# Patient Record
Sex: FEMALE | Race: WHITE | Employment: UNEMPLOYED | URBAN - NONMETROPOLITAN AREA
[De-identification: names, ages, dates, MRNs, and addresses within clinical notes are randomized per-mention and may not be internally consistent; named-entity substitution may affect disease eponyms.]

---

## 2017-09-12 ENCOUNTER — OFFICE VISIT (OUTPATIENT)
Dept: FAMILY MEDICINE CLINIC | Age: 32
End: 2017-09-12

## 2017-09-12 VITALS
HEART RATE: 93 BPM | BODY MASS INDEX: 45.99 KG/M2 | TEMPERATURE: 98.3 F | DIASTOLIC BLOOD PRESSURE: 84 MMHG | HEIGHT: 67 IN | WEIGHT: 293 LBS | SYSTOLIC BLOOD PRESSURE: 124 MMHG | OXYGEN SATURATION: 98 %

## 2017-09-12 DIAGNOSIS — G43.909 MIGRAINE WITHOUT STATUS MIGRAINOSUS, NOT INTRACTABLE, UNSPECIFIED MIGRAINE TYPE: ICD-10-CM

## 2017-09-12 DIAGNOSIS — M79.7 FIBROMYALGIA: ICD-10-CM

## 2017-09-12 DIAGNOSIS — K58.9 IRRITABLE BOWEL SYNDROME, UNSPECIFIED TYPE: Primary | ICD-10-CM

## 2017-09-12 DIAGNOSIS — F41.1 GENERALIZED ANXIETY DISORDER: ICD-10-CM

## 2017-09-12 DIAGNOSIS — J02.9 ACUTE PHARYNGITIS, UNSPECIFIED ETIOLOGY: ICD-10-CM

## 2017-09-12 DIAGNOSIS — K21.9 GASTROESOPHAGEAL REFLUX DISEASE, ESOPHAGITIS PRESENCE NOT SPECIFIED: ICD-10-CM

## 2017-09-12 LAB — S PYO AG THROAT QL: NORMAL

## 2017-09-12 PROCEDURE — 87880 STREP A ASSAY W/OPTIC: CPT | Performed by: NURSE PRACTITIONER

## 2017-09-12 PROCEDURE — 99204 OFFICE O/P NEW MOD 45 MIN: CPT | Performed by: NURSE PRACTITIONER

## 2017-09-12 RX ORDER — CYCLOBENZAPRINE HCL 10 MG
TABLET ORAL
Refills: 0 | COMMUNITY
Start: 2017-07-26 | End: 2017-09-12 | Stop reason: SDUPTHER

## 2017-09-12 RX ORDER — DICLOFENAC SODIUM 75 MG/1
TABLET, DELAYED RELEASE ORAL
Refills: 0 | COMMUNITY
Start: 2017-08-30 | End: 2018-06-01 | Stop reason: ALTCHOICE

## 2017-09-12 RX ORDER — FOLIC ACID 1 MG/1
TABLET ORAL
COMMUNITY
Start: 2015-06-19 | End: 2019-11-14 | Stop reason: ALTCHOICE

## 2017-09-12 RX ORDER — METOPROLOL TARTRATE 50 MG/1
TABLET, FILM COATED ORAL
Refills: 0 | COMMUNITY
Start: 2017-08-30 | End: 2017-12-04 | Stop reason: SDUPTHER

## 2017-09-12 RX ORDER — DICYCLOMINE HCL 20 MG
20 TABLET ORAL 4 TIMES DAILY
COMMUNITY
Start: 2015-07-21 | End: 2018-01-29 | Stop reason: ALTCHOICE

## 2017-09-12 RX ORDER — ALPRAZOLAM 0.5 MG/1
0.5 TABLET ORAL 2 TIMES DAILY PRN
COMMUNITY
Start: 2015-05-05 | End: 2018-02-22 | Stop reason: ALTCHOICE

## 2017-09-12 RX ORDER — AMOXICILLIN 875 MG/1
875 TABLET, COATED ORAL 2 TIMES DAILY
Qty: 20 TABLET | Refills: 0 | Status: SHIPPED | OUTPATIENT
Start: 2017-09-12 | End: 2018-01-25 | Stop reason: ALTCHOICE

## 2017-09-12 RX ORDER — BUTALBITAL, ACETAMINOPHEN AND CAFFEINE 50; 325; 40 MG/1; MG/1; MG/1
1 TABLET ORAL EVERY 4 HOURS PRN
COMMUNITY
Start: 2015-06-19 | End: 2017-09-12 | Stop reason: SDUPTHER

## 2017-09-12 RX ORDER — CYCLOBENZAPRINE HCL 10 MG
10 TABLET ORAL 3 TIMES DAILY PRN
Qty: 30 TABLET | Refills: 0 | Status: SHIPPED | OUTPATIENT
Start: 2017-09-12 | End: 2017-10-26 | Stop reason: SDUPTHER

## 2017-09-12 RX ORDER — ZONISAMIDE 100 MG/1
100 CAPSULE ORAL DAILY
COMMUNITY
End: 2017-09-12

## 2017-09-12 RX ORDER — LORATADINE 10 MG/1
CAPSULE, LIQUID FILLED ORAL
COMMUNITY
End: 2017-10-26 | Stop reason: SDUPTHER

## 2017-09-12 RX ORDER — PROMETHAZINE HYDROCHLORIDE 25 MG/1
25 TABLET ORAL EVERY 6 HOURS PRN
COMMUNITY
End: 2017-12-04 | Stop reason: SDUPTHER

## 2017-09-12 RX ORDER — PANTOPRAZOLE SODIUM 40 MG/1
TABLET, DELAYED RELEASE ORAL
COMMUNITY
Start: 2015-07-21 | End: 2017-12-04 | Stop reason: SDUPTHER

## 2017-09-12 RX ORDER — GABAPENTIN 300 MG/1
CAPSULE ORAL
Refills: 0 | COMMUNITY
Start: 2017-08-08 | End: 2018-02-22 | Stop reason: ALTCHOICE

## 2017-09-12 RX ORDER — KETOROLAC TROMETHAMINE 10 MG/1
10 TABLET, FILM COATED ORAL EVERY 6 HOURS PRN
Refills: 0 | COMMUNITY
Start: 2017-08-02 | End: 2017-10-26 | Stop reason: SDUPTHER

## 2017-09-12 RX ORDER — ALPRAZOLAM 0.5 MG/1
TABLET ORAL
Status: CANCELLED | OUTPATIENT
Start: 2017-09-12

## 2017-09-12 RX ORDER — BUTALBITAL, ACETAMINOPHEN AND CAFFEINE 50; 325; 40 MG/1; MG/1; MG/1
1 TABLET ORAL EVERY 4 HOURS PRN
Qty: 180 TABLET | Refills: 0 | Status: SHIPPED | OUTPATIENT
Start: 2017-09-12 | End: 2017-09-18 | Stop reason: SDUPTHER

## 2017-09-12 RX ORDER — MELOXICAM 15 MG/1
TABLET ORAL
Refills: 0 | COMMUNITY
Start: 2017-08-30 | End: 2017-09-12 | Stop reason: ALTCHOICE

## 2017-09-12 ASSESSMENT — ENCOUNTER SYMPTOMS
SORE THROAT: 1
SHORTNESS OF BREATH: 0
COUGH: 1
NAUSEA: 0

## 2017-09-15 LAB — THROAT CULTURE: NORMAL

## 2017-09-18 ENCOUNTER — TELEPHONE (OUTPATIENT)
Dept: FAMILY MEDICINE CLINIC | Age: 32
End: 2017-09-18

## 2017-09-18 DIAGNOSIS — G43.909 MIGRAINE WITHOUT STATUS MIGRAINOSUS, NOT INTRACTABLE, UNSPECIFIED MIGRAINE TYPE: ICD-10-CM

## 2017-09-18 RX ORDER — BUTALBITAL, ACETAMINOPHEN AND CAFFEINE 50; 325; 40 MG/1; MG/1; MG/1
1 TABLET ORAL EVERY 4 HOURS PRN
Qty: 48 TABLET | Refills: 0 | Status: SHIPPED | OUTPATIENT
Start: 2017-09-18 | End: 2017-10-26 | Stop reason: SDUPTHER

## 2017-09-19 ENCOUNTER — TELEPHONE (OUTPATIENT)
Dept: FAMILY MEDICINE CLINIC | Age: 32
End: 2017-09-19

## 2017-09-19 DIAGNOSIS — B37.9 YEAST INFECTION: Primary | ICD-10-CM

## 2017-09-19 RX ORDER — FLUCONAZOLE 150 MG/1
150 TABLET ORAL ONCE
Qty: 1 TABLET | Refills: 0 | Status: SHIPPED | OUTPATIENT
Start: 2017-09-19 | End: 2017-09-19

## 2017-10-13 ENCOUNTER — OFFICE VISIT (OUTPATIENT)
Dept: GASTROENTEROLOGY | Age: 32
End: 2017-10-13

## 2017-10-13 VITALS
DIASTOLIC BLOOD PRESSURE: 82 MMHG | SYSTOLIC BLOOD PRESSURE: 136 MMHG | HEART RATE: 72 BPM | RESPIRATION RATE: 14 BRPM | HEIGHT: 67 IN

## 2017-10-13 DIAGNOSIS — K21.9 GASTROESOPHAGEAL REFLUX DISEASE, ESOPHAGITIS PRESENCE NOT SPECIFIED: ICD-10-CM

## 2017-10-13 DIAGNOSIS — K58.2 IRRITABLE BOWEL SYNDROME WITH BOTH CONSTIPATION AND DIARRHEA: ICD-10-CM

## 2017-10-13 DIAGNOSIS — K58.2 IRRITABLE BOWEL SYNDROME WITH BOTH CONSTIPATION AND DIARRHEA: Primary | ICD-10-CM

## 2017-10-13 DIAGNOSIS — F17.218 CIGARETTE NICOTINE DEPENDENCE WITH OTHER NICOTINE-INDUCED DISORDER: ICD-10-CM

## 2017-10-13 PROCEDURE — 99202 OFFICE O/P NEW SF 15 MIN: CPT | Performed by: PHYSICIAN ASSISTANT

## 2017-10-13 RX ORDER — FAMOTIDINE 20 MG/1
20 TABLET, FILM COATED ORAL 2 TIMES DAILY
Qty: 60 TABLET | Refills: 3 | Status: ON HOLD | OUTPATIENT
Start: 2017-10-13 | End: 2018-02-15

## 2017-10-13 RX ORDER — DOCUSATE SODIUM 100 MG/1
100 CAPSULE, LIQUID FILLED ORAL DAILY PRN
Qty: 20 CAPSULE | Refills: 1 | Status: SHIPPED | OUTPATIENT
Start: 2017-10-13 | End: 2018-03-20 | Stop reason: SDUPTHER

## 2017-10-16 LAB — CELIAC PANEL: 9 UNITS (ref 0–19)

## 2017-10-16 ASSESSMENT — ENCOUNTER SYMPTOMS
BLOOD IN STOOL: 0
COLOR CHANGE: 0
CHEST TIGHTNESS: 0
APNEA: 0
SHORTNESS OF BREATH: 0
WHEEZING: 0
NAUSEA: 0
ABDOMINAL PAIN: 0
ANAL BLEEDING: 0
DIARRHEA: 1
CONSTIPATION: 1
RECTAL PAIN: 0
VOMITING: 0
ABDOMINAL DISTENTION: 0
COUGH: 0

## 2017-10-16 NOTE — PROGRESS NOTES
Subjective:     Sharda Montesinos is a 28 y.o. female who presents 10/13/2017 with:    Chief Complaint   Patient presents with    Irritable Bowel Syndrome     uncontrolled    Gastroesophageal Reflux       Patient presents for establishment of GI care as well as evaluation of irritable bowel disease. States she has had IBS for about 1 year, has constipation/diarrhea intermittently with equal occurrence of each. States no matter what she eats she becomes bloated, feels abd is distended, and has fecal urgency. Believes these symptoms are worse with breads, pastas, etc.  Knows that she is bothered by dairy products and avoids these foods. Has not noted purulence per rectum, denies vomiting, hematemesis, hematochezia, melena, rectal bleeding, rectal pain, weight loss, dysphagia, odynophagia but does admit to nausea, heartburn. Had EGD/colonoscopy in 2013 with Dr. Sandeep Tobar which showed mild gastritis and normal colonoscopy. Admits to smoking cigarettes currently, denies excessive ETOH and elicit drug abuse.          Patient Active Problem List   Diagnosis    Asthma    Generalized anxiety disorder    GERD (gastroesophageal reflux disease)    Migraine    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HCC)    PCOS (polycystic ovarian syndrome)    Elevated blood pressure reading    Human papilloma virus infection    Dyslipidemia    Obesity    Malabsorption syndrome    Family history of cancer    IBS (irritable bowel syndrome)    Status post cholecystectomy    Nicotine dependence    Vertigo    Depression    Coagulopathy (HCC)    Fibromyalgia     Past Medical History:   Diagnosis Date    Anxiety     Asthma     Depression 10/29/2013    Dyslipidemia     Elevated blood pressure     Fibromyalgia     Fingernail abnormalities     right middle fingernail deformity    Generalized anxiety disorder     GERD (gastroesophageal reflux disease)     HPV (human papillomavirus)     IBS (irritable bowel syndrome) 7/31/2012    Migraine     Obesity     Pap smear for cervical cancer screening     2009- normal, pcos    PCOS (polycystic ovarian syndrome)     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[address]     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 Social History Main Topics    Smoking status: Current Every Day Smoker     Packs/day: 0.30     Years: 17.00     Types: Cigarettes    Smokeless tobacco: Never Used    Alcohol use No    Drug use: No    Sexual activity: No     Other Topics Concern    Not on file     Social History Narrative    No narrative on file     Family History   Problem Relation Age of Onset    Arthritis Mother     Depression Mother     Depression Father     Diabetes Other     Cancer Other     Stroke Other     Obesity Other     Diabetes Maternal Aunt     Diabetes Maternal Uncle     Cancer Maternal Grandmother     Cancer Paternal Grandfather      Allergies:   Iodides and Lamotrigine  Current Outpatient Prescriptions   Medication Sig Dispense Refill    famotidine (PEPCID) 20 MG tablet Take 1 tablet by mouth 2 times daily 60 tablet 3    docusate sodium (COLACE) 100 MG capsule Take 1 capsule by mouth daily as needed for Constipation 20 capsule 1    psyllium (METAMUCIL SMOOTH TEXTURE) 28 % packet Take 1 packet by mouth 2 times daily Take with full glass of h20 or juice 30 each 4    butalbital-acetaminophen-caffeine (FIORICET, ESGIC) -40 MG per tablet Take 1 tablet by mouth every 4 hours as needed for Headaches 48 tablet 0    dicyclomine (BENTYL) 20 MG tablet Take 40 mg by mouth 2 times daily       folic acid (FOLVITE) 1 MG tablet       loratadine (CLARITIN) 10 MG capsule Take by mouth      pantoprazole (PROTONIX) 40 MG tablet       promethazine (PHENERGAN) 25 MG tablet Take 25 mg by mouth every 6 hours as needed for Nausea       diclofenac (VOLTAREN) 75 MG EC tablet TAKE 1 TABLET BY MOUTH TWICE DAILY with food or milk  0    gabapentin (NEURONTIN) 300 MG capsule nightly prn  0    ketorolac (TORADOL) 10 MG tablet Take 10 mg by mouth every 6 hours as needed (migraines)   0    metoprolol tartrate (LOPRESSOR) 50 MG tablet TAKE 1 TABLET BY MOUTH TWICE DAILY  0    cyclobenzaprine (FLEXERIL) 10 MG tablet Take 1 tablet by mouth 3 times daily as needed for Muscle spasms 30 tablet 0    ALPRAZolam (XANAX) 0.5 MG tablet Take 0.5 mg by mouth 2 times daily as needed       amoxicillin (AMOXIL) 875 MG tablet Take 1 tablet by mouth 2 times daily 20 tablet 0     No current facility-administered medications for this visit. Review of Systems   Constitutional: Negative for activity change, appetite change, chills, diaphoresis, fatigue, fever and unexpected weight change. Respiratory: Negative for apnea, cough, chest tightness, shortness of breath and wheezing. Cardiovascular: Negative for chest pain, palpitations and leg swelling. Gastrointestinal: Positive for constipation and diarrhea. Negative for abdominal distention, abdominal pain, anal bleeding, blood in stool, nausea, rectal pain and vomiting. Skin: Negative for color change, pallor, rash and wound. Neurological: Negative for dizziness, tremors, seizures, syncope, speech difficulty, weakness, light-headedness and headaches. Hematological: Negative for adenopathy. Does not bruise/bleed easily. Psychiatric/Behavioral: Negative for agitation, confusion, decreased concentration and dysphoric mood. The patient is not nervous/anxious. All other systems reviewed and are negative.       Objective:     Vitals:  /82   Pulse 72   Resp 14   Ht 5' 7\" (1.702 m)   There is no height or weight on file to calculate BMI. Physical Exam   Constitutional: She is oriented to person, place, and time. She appears well-developed and well-nourished. No distress. HENT:   Head: Normocephalic and atraumatic. Neck: Normal range of motion. Neck supple. No JVD present. No tracheal deviation present. No thyromegaly present. Cardiovascular: Normal rate, regular rhythm and normal heart sounds. Pulmonary/Chest: Effort normal and breath sounds normal. No respiratory distress. She has no wheezes. She has no rales. Abdominal: Soft. Bowel sounds are normal. She exhibits no distension. There is no tenderness. There is no rigidity and no guarding. Musculoskeletal: Normal range of motion. She exhibits no edema or tenderness. Neurological: She is alert and oriented to person, place, and time. Skin: Skin is warm and dry. No rash noted. No erythema. No pallor. Psychiatric: She has a normal mood and affect. Her behavior is normal. Thought content normal.   Nursing note and vitals reviewed. Assessment:     1. Irritable bowel syndrome with both constipation and diarrhea  Celiac Disease Panel   2. Gastroesophageal reflux disease, esophagitis presence not specified     3.  Cigarette nicotine dependence with other nicotine-induced disorder         Plan:     -Discussed taking PPI 30 minutes prior to meals with full glass of water    -Pepcid 20mg PO BID PRN for breakthrough reflux symptoms    -Metamucil 2 times daily until regular BM daily    -Colace 100mg PO BID PRN for constipation, take until having BM daily    -Informed to call office with any questions/concerns/changes    Orders Placed This Encounter   Procedures    Celiac Disease Panel     Standing Status:   Future     Number of Occurrences:   1     Standing Expiration Date:   10/13/2018     Orders Placed This Encounter   Medications    famotidine (PEPCID) 20 MG tablet     Sig: Take 1 tablet by mouth 2 times daily     Dispense:  60 tablet     Refill:  3    docusate sodium (COLACE) 100 MG capsule     Sig: Take 1 capsule by mouth daily as needed for Constipation     Dispense:  20 capsule     Refill:  1    psyllium (METAMUCIL SMOOTH TEXTURE) 28 % packet     Sig: Take 1 packet by mouth 2 times daily Take with full glass of h20 or juice     Dispense:  30 each     Refill:  4       Follow up:  No Follow-up on file.     ABRAHAM Bradford

## 2017-10-23 ENCOUNTER — TELEPHONE (OUTPATIENT)
Dept: FAMILY MEDICINE CLINIC | Age: 32
End: 2017-10-23

## 2017-10-23 NOTE — TELEPHONE ENCOUNTER
Pt stopped taking the amoxicillin after three days due to yeast infection. Sore throat and cough came back is there something else she can take. Please call into [facility]. Pt phone number is [phone].

## 2017-10-23 NOTE — TELEPHONE ENCOUNTER
She was seen over a month ago. This is likely a new illness and she will need to be seen again for this.

## 2017-10-24 ENCOUNTER — OFFICE VISIT (OUTPATIENT)
Dept: FAMILY MEDICINE CLINIC | Age: 32
End: 2017-10-24

## 2017-10-24 VITALS
WEIGHT: 293 LBS | HEART RATE: 79 BPM | SYSTOLIC BLOOD PRESSURE: 128 MMHG | OXYGEN SATURATION: 99 % | HEIGHT: 67 IN | TEMPERATURE: 96.8 F | BODY MASS INDEX: 45.99 KG/M2 | DIASTOLIC BLOOD PRESSURE: 88 MMHG

## 2017-10-24 DIAGNOSIS — J20.9 ACUTE BRONCHITIS, UNSPECIFIED ORGANISM: Primary | ICD-10-CM

## 2017-10-24 DIAGNOSIS — I10 ESSENTIAL HYPERTENSION: ICD-10-CM

## 2017-10-24 PROCEDURE — 99213 OFFICE O/P EST LOW 20 MIN: CPT | Performed by: NURSE PRACTITIONER

## 2017-10-24 PROCEDURE — G8417 CALC BMI ABV UP PARAM F/U: HCPCS | Performed by: NURSE PRACTITIONER

## 2017-10-24 PROCEDURE — G8427 DOCREV CUR MEDS BY ELIG CLIN: HCPCS | Performed by: NURSE PRACTITIONER

## 2017-10-24 PROCEDURE — G8484 FLU IMMUNIZE NO ADMIN: HCPCS | Performed by: NURSE PRACTITIONER

## 2017-10-24 PROCEDURE — 4004F PT TOBACCO SCREEN RCVD TLK: CPT | Performed by: NURSE PRACTITIONER

## 2017-10-24 RX ORDER — AZITHROMYCIN 250 MG/1
TABLET, FILM COATED ORAL
Qty: 1 PACKET | Refills: 0 | Status: SHIPPED | OUTPATIENT
Start: 2017-10-24 | End: 2018-01-25 | Stop reason: ALTCHOICE

## 2017-10-24 RX ORDER — AMITRIPTYLINE HYDROCHLORIDE 25 MG/1
TABLET, FILM COATED ORAL
Refills: 1 | COMMUNITY
Start: 2017-09-27 | End: 2017-10-24 | Stop reason: DRUGHIGH

## 2017-10-24 RX ORDER — AMITRIPTYLINE HYDROCHLORIDE 100 MG/1
50 TABLET, FILM COATED ORAL NIGHTLY
COMMUNITY
End: 2018-02-22 | Stop reason: DRUGHIGH

## 2017-10-24 ASSESSMENT — ENCOUNTER SYMPTOMS
COUGH: 1
RHINORRHEA: 1
NAUSEA: 0
SORE THROAT: 1
SHORTNESS OF BREATH: 0
SINUS PAIN: 1
SINUS PRESSURE: 1

## 2017-10-24 NOTE — PATIENT INSTRUCTIONS
serving is 1 slice of bread, 1 ounce of dry cereal, or ½ cup of cooked rice, pasta, or cooked cereal. Try to choose whole-grain products as much as possible. · Limit lean meat, poultry, and fish to 2 servings each day. A serving is 3 ounces, about the size of a deck of cards. · Eat 4 to 5 servings of nuts, seeds, and legumes (cooked dried beans, lentils, and split peas) each week. A serving is 1/3 cup of nuts, 2 tablespoons of seeds, or ½ cup of cooked beans or peas. · Limit fats and oils to 2 to 3 servings each day. A serving is 1 teaspoon of vegetable oil or 2 tablespoons of salad dressing. · Limit sweets and added sugars to 5 servings or less a week. A serving is 1 tablespoon jelly or jam, ½ cup sorbet, or 1 cup of lemonade. · Eat less than 2,300 milligrams (mg) of sodium a day. If you limit your sodium to 1,500 mg a day, you can lower your blood pressure even more. Tips for success  · Start small. Do not try to make dramatic changes to your diet all at once. You might feel that you are missing out on your favorite foods and then be more likely to not follow the plan. Make small changes, and stick with them. Once those changes become habit, add a few more changes. · Try some of the following:  ¨ Make it a goal to eat a fruit or vegetable at every meal and at snacks. This will make it easy to get the recommended amount of fruits and vegetables each day. ¨ Try yogurt topped with fruit and nuts for a snack or healthy dessert. ¨ Add lettuce, tomato, cucumber, and onion to sandwiches. ¨ Combine a ready-made pizza crust with low-fat mozzarella cheese and lots of vegetable toppings. Try using tomatoes, squash, spinach, broccoli, carrots, cauliflower, and onions. ¨ Have a variety of cut-up vegetables with a low-fat dip as an appetizer instead of chips and dip. ¨ Sprinkle sunflower seeds or chopped almonds over salads. Or try adding chopped walnuts or almonds to cooked vegetables.   ¨ Try some vegetarian meals with water softeners, which add sodium. Call your local water company to find out the sodium content of your water supply. If you buy bottled water, read the label and choose a sodium-free brand. Avoid high-sodium foods  · Avoid eating:  ¨ Smoked, cured, salted, and canned meat, fish, and poultry. ¨ Ham, aguilar, hot dogs, and luncheon meats. ¨ Regular, hard, and processed cheese and regular peanut butter. ¨ Crackers with salted tops, and other salted snack foods such as pretzels, chips, and salted popcorn. ¨ Frozen prepared meals, unless labeled low-sodium. ¨ Canned and dried soups, broths, and bouillon, unless labeled sodium-free or low-sodium. ¨ Canned vegetables, unless labeled sodium-free or low-sodium. ¨ [employer] fries, pizza, tacos, and other fast foods. ¨ Pickles, olives, ketchup, and other condiments, especially soy sauce, unless labeled sodium-free or low-sodium. Where can you learn more? Go to https://2DOLife.com.QuVIS. org and sign in to your [employer] account. Enter V739 in the [facility] box to learn more about \"Low Sodium Diet (2,000 Milligram): Care Instructions. \"     If you do not have an account, please click on the \"Sign Up Now\" link. Current as of: July 26, 2016  Content Version: 11.3  © 3536-8171 [employer]. Care instructions adapted under license by [facility] ([facility]). If you have questions about a medical condition or this instruction, always ask your healthcare professional. [place] [address] any warranty or liability for your use of this information.

## 2017-10-24 NOTE — PROGRESS NOTES
updated the computerized patient record. As always, patient is advised that if symptoms worsen in any way they will proceed to the nearest emergency room. No orders of the defined types were placed in this encounter. Orders Placed This Encounter   Medications    azithromycin (ZITHROMAX Z-CRISTA) 250 MG tablet     Sig: Take 2 tablets on day 1 then 1 tablet on days 2-5     Dispense:  1 packet     Refill:  0       Return in about 3 months (around 1/24/2018) for recheck bp.     Tank Galloway, CNP

## 2017-10-26 DIAGNOSIS — G43.909 MIGRAINE WITHOUT STATUS MIGRAINOSUS, NOT INTRACTABLE, UNSPECIFIED MIGRAINE TYPE: ICD-10-CM

## 2017-10-26 DIAGNOSIS — M79.7 FIBROMYALGIA: ICD-10-CM

## 2017-10-26 RX ORDER — CYCLOBENZAPRINE HCL 10 MG
10 TABLET ORAL 3 TIMES DAILY PRN
Qty: 30 TABLET | Refills: 0 | Status: SHIPPED | OUTPATIENT
Start: 2017-10-26 | End: 2018-01-29 | Stop reason: SDUPTHER

## 2017-10-26 RX ORDER — BUTALBITAL, ACETAMINOPHEN AND CAFFEINE 50; 325; 40 MG/1; MG/1; MG/1
1 TABLET ORAL EVERY 4 HOURS PRN
Qty: 48 TABLET | Refills: 0 | Status: SHIPPED | OUTPATIENT
Start: 2017-10-26 | End: 2017-12-04 | Stop reason: SDUPTHER

## 2017-10-26 RX ORDER — LORATADINE 10 MG/1
10 CAPSULE, LIQUID FILLED ORAL DAILY
Qty: 30 CAPSULE | Refills: 5 | Status: SHIPPED | OUTPATIENT
Start: 2017-10-26 | End: 2018-03-14 | Stop reason: SDUPTHER

## 2017-10-26 RX ORDER — KETOROLAC TROMETHAMINE 10 MG/1
10 TABLET, FILM COATED ORAL EVERY 6 HOURS PRN
Qty: 20 TABLET | Refills: 0 | Status: SHIPPED | OUTPATIENT
Start: 2017-10-26 | End: 2017-12-27 | Stop reason: SDUPTHER

## 2017-12-04 DIAGNOSIS — G43.909 MIGRAINE WITHOUT STATUS MIGRAINOSUS, NOT INTRACTABLE, UNSPECIFIED MIGRAINE TYPE: ICD-10-CM

## 2017-12-04 RX ORDER — BUTALBITAL, ACETAMINOPHEN AND CAFFEINE 50; 325; 40 MG/1; MG/1; MG/1
1 TABLET ORAL EVERY 4 HOURS PRN
Qty: 48 TABLET | Refills: 0 | Status: SHIPPED | OUTPATIENT
Start: 2017-12-04 | End: 2017-12-27 | Stop reason: SDUPTHER

## 2017-12-04 RX ORDER — PANTOPRAZOLE SODIUM 40 MG/1
40 TABLET, DELAYED RELEASE ORAL DAILY
Qty: 30 TABLET | Refills: 3 | Status: SHIPPED | OUTPATIENT
Start: 2017-12-04 | End: 2018-01-29 | Stop reason: SDUPTHER

## 2017-12-04 RX ORDER — PROMETHAZINE HYDROCHLORIDE 25 MG/1
25 TABLET ORAL EVERY 6 HOURS PRN
Qty: 15 TABLET | Refills: 0 | Status: SHIPPED | OUTPATIENT
Start: 2017-12-04 | End: 2018-01-25 | Stop reason: SDUPTHER

## 2017-12-04 RX ORDER — METOPROLOL TARTRATE 50 MG/1
TABLET, FILM COATED ORAL
Qty: 60 TABLET | Refills: 5 | Status: SHIPPED | OUTPATIENT
Start: 2017-12-04 | End: 2018-05-22 | Stop reason: SDUPTHER

## 2017-12-27 DIAGNOSIS — G43.909 MIGRAINE WITHOUT STATUS MIGRAINOSUS, NOT INTRACTABLE, UNSPECIFIED MIGRAINE TYPE: ICD-10-CM

## 2017-12-28 RX ORDER — BUTALBITAL, ACETAMINOPHEN AND CAFFEINE 50; 325; 40 MG/1; MG/1; MG/1
1 TABLET ORAL EVERY 4 HOURS PRN
Qty: 48 TABLET | Refills: 0 | Status: SHIPPED | OUTPATIENT
Start: 2017-12-28 | End: 2018-01-25 | Stop reason: SDUPTHER

## 2017-12-28 RX ORDER — KETOROLAC TROMETHAMINE 10 MG/1
10 TABLET, FILM COATED ORAL EVERY 6 HOURS PRN
Qty: 20 TABLET | Refills: 0 | Status: SHIPPED | OUTPATIENT
Start: 2017-12-28 | End: 2018-01-25 | Stop reason: SDUPTHER

## 2018-01-25 ENCOUNTER — OFFICE VISIT (OUTPATIENT)
Dept: FAMILY MEDICINE CLINIC | Age: 33
End: 2018-01-25
Payer: COMMERCIAL

## 2018-01-25 VITALS
DIASTOLIC BLOOD PRESSURE: 90 MMHG | SYSTOLIC BLOOD PRESSURE: 124 MMHG | WEIGHT: 293 LBS | BODY MASS INDEX: 45.99 KG/M2 | HEIGHT: 67 IN | HEART RATE: 76 BPM | TEMPERATURE: 96.7 F | OXYGEN SATURATION: 97 %

## 2018-01-25 DIAGNOSIS — G43.909 MIGRAINE WITHOUT STATUS MIGRAINOSUS, NOT INTRACTABLE, UNSPECIFIED MIGRAINE TYPE: Primary | ICD-10-CM

## 2018-01-25 DIAGNOSIS — R53.83 FATIGUE, UNSPECIFIED TYPE: ICD-10-CM

## 2018-01-25 DIAGNOSIS — Z13.220 LIPID SCREENING: ICD-10-CM

## 2018-01-25 DIAGNOSIS — R11.0 NAUSEA: ICD-10-CM

## 2018-01-25 DIAGNOSIS — Z23 NEED FOR 23-POLYVALENT PNEUMOCOCCAL POLYSACCHARIDE VACCINE: ICD-10-CM

## 2018-01-25 DIAGNOSIS — F41.1 GENERALIZED ANXIETY DISORDER: ICD-10-CM

## 2018-01-25 PROCEDURE — 90732 PPSV23 VACC 2 YRS+ SUBQ/IM: CPT | Performed by: NURSE PRACTITIONER

## 2018-01-25 PROCEDURE — G8484 FLU IMMUNIZE NO ADMIN: HCPCS | Performed by: NURSE PRACTITIONER

## 2018-01-25 PROCEDURE — 99214 OFFICE O/P EST MOD 30 MIN: CPT | Performed by: NURSE PRACTITIONER

## 2018-01-25 PROCEDURE — 90471 IMMUNIZATION ADMIN: CPT | Performed by: NURSE PRACTITIONER

## 2018-01-25 PROCEDURE — G8417 CALC BMI ABV UP PARAM F/U: HCPCS | Performed by: NURSE PRACTITIONER

## 2018-01-25 PROCEDURE — G8427 DOCREV CUR MEDS BY ELIG CLIN: HCPCS | Performed by: NURSE PRACTITIONER

## 2018-01-25 PROCEDURE — 4004F PT TOBACCO SCREEN RCVD TLK: CPT | Performed by: NURSE PRACTITIONER

## 2018-01-25 RX ORDER — BUTALBITAL, ACETAMINOPHEN AND CAFFEINE 50; 325; 40 MG/1; MG/1; MG/1
1 TABLET ORAL EVERY 4 HOURS PRN
Qty: 48 TABLET | Refills: 0 | Status: SHIPPED | OUTPATIENT
Start: 2018-01-25 | End: 2018-02-22 | Stop reason: SDUPTHER

## 2018-01-25 RX ORDER — PROMETHAZINE HYDROCHLORIDE 25 MG/1
25 TABLET ORAL EVERY 6 HOURS PRN
Qty: 15 TABLET | Refills: 0 | Status: SHIPPED | OUTPATIENT
Start: 2018-01-25 | End: 2018-03-14 | Stop reason: SDUPTHER

## 2018-01-25 RX ORDER — KETOROLAC TROMETHAMINE 10 MG/1
10 TABLET, FILM COATED ORAL EVERY 6 HOURS PRN
Qty: 20 TABLET | Refills: 0 | Status: SHIPPED | OUTPATIENT
Start: 2018-01-25 | End: 2018-01-29 | Stop reason: ALTCHOICE

## 2018-01-25 ASSESSMENT — PATIENT HEALTH QUESTIONNAIRE - PHQ9
2. FEELING DOWN, DEPRESSED OR HOPELESS: 1
SUM OF ALL RESPONSES TO PHQ QUESTIONS 1-9: 2
1. LITTLE INTEREST OR PLEASURE IN DOING THINGS: 1
SUM OF ALL RESPONSES TO PHQ9 QUESTIONS 1 & 2: 2

## 2018-01-25 ASSESSMENT — ENCOUNTER SYMPTOMS
SHORTNESS OF BREATH: 0
COUGH: 0

## 2018-01-29 ENCOUNTER — OFFICE VISIT (OUTPATIENT)
Dept: GASTROENTEROLOGY | Age: 33
End: 2018-01-29
Payer: COMMERCIAL

## 2018-01-29 VITALS
SYSTOLIC BLOOD PRESSURE: 120 MMHG | DIASTOLIC BLOOD PRESSURE: 76 MMHG | HEART RATE: 106 BPM | OXYGEN SATURATION: 97 % | WEIGHT: 293 LBS | BODY MASS INDEX: 45.99 KG/M2 | HEIGHT: 67 IN

## 2018-01-29 DIAGNOSIS — K58.9 IRRITABLE BOWEL SYNDROME, UNSPECIFIED TYPE: Primary | ICD-10-CM

## 2018-01-29 DIAGNOSIS — K21.00 GERD WITH ESOPHAGITIS: ICD-10-CM

## 2018-01-29 DIAGNOSIS — M79.7 FIBROMYALGIA: ICD-10-CM

## 2018-01-29 DIAGNOSIS — R53.83 FATIGUE, UNSPECIFIED TYPE: ICD-10-CM

## 2018-01-29 DIAGNOSIS — Z13.220 LIPID SCREENING: ICD-10-CM

## 2018-01-29 LAB
CHOLESTEROL, TOTAL: 203 MG/DL (ref 0–199)
HCT VFR BLD CALC: 42.2 % (ref 37–47)
HDLC SERPL-MCNC: 44 MG/DL (ref 40–59)
HEMOGLOBIN: 13.6 G/DL (ref 12–16)
IRON SATURATION: 13 % (ref 11–46)
IRON: 31 UG/DL (ref 37–145)
LDL CHOLESTEROL CALCULATED: 134 MG/DL (ref 0–129)
MCH RBC QN AUTO: 27.7 PG (ref 27–31.3)
MCHC RBC AUTO-ENTMCNC: 32.2 % (ref 33–37)
MCV RBC AUTO: 85.9 FL (ref 82–100)
PDW BLD-RTO: 16.2 % (ref 11.5–14.5)
PLATELET # BLD: 289 K/UL (ref 130–400)
RBC # BLD: 4.91 M/UL (ref 4.2–5.4)
TOTAL IRON BINDING CAPACITY: 245 UG/DL (ref 178–450)
TRIGL SERPL-MCNC: 126 MG/DL (ref 0–200)
TSH REFLEX: 1.55 UIU/ML (ref 0.27–4.2)
WBC # BLD: 9.4 K/UL (ref 4.8–10.8)

## 2018-01-29 PROCEDURE — 4004F PT TOBACCO SCREEN RCVD TLK: CPT | Performed by: PHYSICIAN ASSISTANT

## 2018-01-29 PROCEDURE — G8484 FLU IMMUNIZE NO ADMIN: HCPCS | Performed by: PHYSICIAN ASSISTANT

## 2018-01-29 PROCEDURE — G8427 DOCREV CUR MEDS BY ELIG CLIN: HCPCS | Performed by: PHYSICIAN ASSISTANT

## 2018-01-29 PROCEDURE — G8417 CALC BMI ABV UP PARAM F/U: HCPCS | Performed by: PHYSICIAN ASSISTANT

## 2018-01-29 PROCEDURE — 99213 OFFICE O/P EST LOW 20 MIN: CPT | Performed by: PHYSICIAN ASSISTANT

## 2018-01-29 RX ORDER — DICYCLOMINE HCL 20 MG
20 TABLET ORAL EVERY 6 HOURS
Qty: 120 TABLET | Refills: 3 | Status: SHIPPED | OUTPATIENT
Start: 2018-01-29 | End: 2018-03-20 | Stop reason: SDUPTHER

## 2018-01-29 RX ORDER — PANTOPRAZOLE SODIUM 40 MG/1
40 TABLET, DELAYED RELEASE ORAL
Qty: 60 TABLET | Refills: 1 | Status: SHIPPED | OUTPATIENT
Start: 2018-01-29 | End: 2018-04-11 | Stop reason: SDUPTHER

## 2018-01-29 ASSESSMENT — ENCOUNTER SYMPTOMS
VOMITING: 0
RECTAL PAIN: 0
ABDOMINAL DISTENTION: 0
ANAL BLEEDING: 0
BLOOD IN STOOL: 0
WHEEZING: 0
CONSTIPATION: 0
ABDOMINAL PAIN: 1
COLOR CHANGE: 0
CHEST TIGHTNESS: 0
APNEA: 0
NAUSEA: 0
COUGH: 0
DIARRHEA: 1
SHORTNESS OF BREATH: 0

## 2018-01-29 NOTE — PROGRESS NOTES
Subjective:     Puneet Warner is a 28 y.o. female who presents 1/29/2018 with:    Chief Complaint   Patient presents with    Abdominal Pain     cramping, burning    Nausea     constant        Patient presents for establishment of GI care as well as evaluation of irritable bowel disease. States she has had IBS for about 1 year, has constipation/diarrhea intermittently with equal occurrence of each. States no matter what she eats she becomes bloated, feels abd is distended, and has fecal urgency. Believes these symptoms are worse with breads, pastas, etc.  Knows that she is bothered by dairy products and avoids these foods. Has not noted purulence per rectum, denies vomiting, hematemesis, hematochezia, melena, rectal bleeding, rectal pain, weight loss, dysphagia, odynophagia but does admit to nausea, heartburn. Had EGD/colonoscopy in 2013 with Dr. Geovany Maldonado which showed mild gastritis and normal colonoscopy. Admits to smoking cigarettes currently, denies excessive ETOH and elicit drug abuse. 1/29/18  Patient presents for f/u regarding abd cramping, rapid transit and poorly controlled GERD. Patient seen previously for these issues which were not under good control, started on Protonix and Bentyl which she reports improved symptoms however she has regressed and is having frequent heartburn, cramping after meals and having to rush to the bathroom urgently for BM 30 minutes after eating. Patient does have significant history of H pylori gastritis diagnosed via EGD performed in 2013. She states her current symptoms are similar to those around that time. Abdominal Pain   Associated symptoms include diarrhea. Pertinent negatives include no constipation, fever, headaches, nausea or vomiting.        Patient Active Problem List   Diagnosis    Asthma    Generalized anxiety disorder    GERD (gastroesophageal reflux disease)    Migraine    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HCC)    PCOS (polycystic ovarian syndrome)    Negative for abdominal distention, anal bleeding, blood in stool, constipation, nausea, rectal pain and vomiting. Skin: Negative for color change, pallor, rash and wound. Neurological: Negative for dizziness, tremors, seizures, syncope, speech difficulty, weakness, light-headedness and headaches. Hematological: Negative for adenopathy. Does not bruise/bleed easily. Psychiatric/Behavioral: Negative for agitation, confusion, decreased concentration and dysphoric mood. The patient is not nervous/anxious. All other systems reviewed and are negative. Objective:     Vitals:  /76   Pulse 106   Ht 5' 7\" (1.702 m)   Wt (!) 323 lb (146.5 kg)   SpO2 97%   BMI 50.59 kg/m²   Body mass index is 50.59 kg/m². Physical Exam   Constitutional: She is oriented to person, place, and time. She appears well-developed and well-nourished. No distress. HENT:   Head: Normocephalic and atraumatic. Eyes: Right eye exhibits no discharge. Left eye exhibits no discharge. No scleral icterus. Neck: Normal range of motion. Neck supple. No JVD present. No tracheal deviation present. No thyromegaly present. Cardiovascular: Normal rate, regular rhythm and normal heart sounds. Pulmonary/Chest: Effort normal and breath sounds normal. No respiratory distress. She has no wheezes. She has no rales. Abdominal: Soft. Bowel sounds are normal. She exhibits no distension. There is no tenderness. There is no rigidity and no guarding. Musculoskeletal: Normal range of motion. She exhibits no edema, tenderness or deformity. Lymphadenopathy:     She has no cervical adenopathy. Neurological: She is alert and oriented to person, place, and time. Skin: Skin is warm and dry. No rash noted. No erythema. No pallor. Psychiatric: She has a normal mood and affect. Her behavior is normal. Thought content normal.   Nursing note and vitals reviewed. Assessment:     1. Irritable bowel syndrome, unspecified type     2.  GERD

## 2018-01-29 NOTE — PROGRESS NOTES
capsule nightly prn  0     No current facility-administered medications for this visit. Review of Systems    Objective:     Vitals:  /76   Pulse 106   Ht 5' 7\" (1.702 m)   Wt (!) 323 lb (146.5 kg)   SpO2 97%   BMI 50.59 kg/m²   Body mass index is 50.59 kg/m². Physical Exam    Assessment:     No diagnosis found. Plan:     No orders of the defined types were placed in this encounter. No orders of the defined types were placed in this encounter. Follow up:  No Follow-up on file.     ABRAHAM Fishman

## 2018-01-30 RX ORDER — CYCLOBENZAPRINE HCL 10 MG
10 TABLET ORAL 3 TIMES DAILY PRN
Qty: 30 TABLET | Refills: 0 | Status: SHIPPED | OUTPATIENT
Start: 2018-01-30 | End: 2018-03-23 | Stop reason: SDUPTHER

## 2018-02-12 ENCOUNTER — ANESTHESIA EVENT (OUTPATIENT)
Dept: OPERATING ROOM | Age: 33
End: 2018-02-12
Payer: COMMERCIAL

## 2018-02-13 ENCOUNTER — ANESTHESIA (OUTPATIENT)
Dept: OPERATING ROOM | Age: 33
End: 2018-02-13
Payer: COMMERCIAL

## 2018-02-15 ENCOUNTER — HOSPITAL ENCOUNTER (OUTPATIENT)
Age: 33
Setting detail: OUTPATIENT SURGERY
Discharge: HOME | End: 2018-02-15
Attending: INTERNAL MEDICINE | Admitting: INTERNAL MEDICINE
Payer: COMMERCIAL

## 2018-02-15 VITALS
DIASTOLIC BLOOD PRESSURE: 72 MMHG | RESPIRATION RATE: 17 BRPM | SYSTOLIC BLOOD PRESSURE: 156 MMHG | OXYGEN SATURATION: 96 %

## 2018-02-15 VITALS
SYSTOLIC BLOOD PRESSURE: 123 MMHG | RESPIRATION RATE: 15 BRPM | BODY MASS INDEX: 45.99 KG/M2 | HEART RATE: 79 BPM | OXYGEN SATURATION: 96 % | WEIGHT: 293 LBS | HEIGHT: 67 IN | DIASTOLIC BLOOD PRESSURE: 76 MMHG | TEMPERATURE: 97.9 F

## 2018-02-15 DIAGNOSIS — K31.89 RETAINED FOOD IN STOMACH: Primary | ICD-10-CM

## 2018-02-15 DIAGNOSIS — K21.9 GASTROESOPHAGEAL REFLUX DISEASE WITHOUT ESOPHAGITIS: ICD-10-CM

## 2018-02-15 PROCEDURE — 2580000003 HC RX 258: Performed by: INTERNAL MEDICINE

## 2018-02-15 PROCEDURE — 7100000011 HC PHASE II RECOVERY - ADDTL 15 MIN: Performed by: INTERNAL MEDICINE

## 2018-02-15 PROCEDURE — 2500000003 HC RX 250 WO HCPCS: Performed by: STUDENT IN AN ORGANIZED HEALTH CARE EDUCATION/TRAINING PROGRAM

## 2018-02-15 PROCEDURE — 7100000010 HC PHASE II RECOVERY - FIRST 15 MIN: Performed by: INTERNAL MEDICINE

## 2018-02-15 PROCEDURE — 2580000003 HC RX 258: Performed by: STUDENT IN AN ORGANIZED HEALTH CARE EDUCATION/TRAINING PROGRAM

## 2018-02-15 PROCEDURE — 43235 EGD DIAGNOSTIC BRUSH WASH: CPT | Performed by: INTERNAL MEDICINE

## 2018-02-15 PROCEDURE — 3700000001 HC ADD 15 MINUTES (ANESTHESIA): Performed by: INTERNAL MEDICINE

## 2018-02-15 PROCEDURE — 3700000000 HC ANESTHESIA ATTENDED CARE: Performed by: INTERNAL MEDICINE

## 2018-02-15 PROCEDURE — 3609017100 HC EGD: Performed by: INTERNAL MEDICINE

## 2018-02-15 RX ORDER — DIPHENHYDRAMINE HYDROCHLORIDE 50 MG/ML
12.5 INJECTION INTRAMUSCULAR; INTRAVENOUS
Status: DISCONTINUED | OUTPATIENT
Start: 2018-02-15 | End: 2018-02-15 | Stop reason: HOSPADM

## 2018-02-15 RX ORDER — FENTANYL CITRATE 50 UG/ML
50 INJECTION, SOLUTION INTRAMUSCULAR; INTRAVENOUS EVERY 10 MIN PRN
Status: DISCONTINUED | OUTPATIENT
Start: 2018-02-15 | End: 2018-02-15 | Stop reason: HOSPADM

## 2018-02-15 RX ORDER — MAGNESIUM HYDROXIDE 1200 MG/15ML
LIQUID ORAL PRN
Status: DISCONTINUED | OUTPATIENT
Start: 2018-02-15 | End: 2018-02-15 | Stop reason: HOSPADM

## 2018-02-15 RX ORDER — SODIUM CHLORIDE 0.9 % (FLUSH) 0.9 %
10 SYRINGE (ML) INJECTION EVERY 12 HOURS SCHEDULED
Status: DISCONTINUED | OUTPATIENT
Start: 2018-02-15 | End: 2018-02-15 | Stop reason: HOSPADM

## 2018-02-15 RX ORDER — ONDANSETRON 2 MG/ML
4 INJECTION INTRAMUSCULAR; INTRAVENOUS
Status: DISCONTINUED | OUTPATIENT
Start: 2018-02-15 | End: 2018-02-15 | Stop reason: HOSPADM

## 2018-02-15 RX ORDER — MEPERIDINE HYDROCHLORIDE 25 MG/ML
12.5 INJECTION INTRAMUSCULAR; INTRAVENOUS; SUBCUTANEOUS EVERY 5 MIN PRN
Status: DISCONTINUED | OUTPATIENT
Start: 2018-02-15 | End: 2018-02-15 | Stop reason: HOSPADM

## 2018-02-15 RX ORDER — METOCLOPRAMIDE HYDROCHLORIDE 5 MG/ML
10 INJECTION INTRAMUSCULAR; INTRAVENOUS
Status: DISCONTINUED | OUTPATIENT
Start: 2018-02-15 | End: 2018-02-15 | Stop reason: HOSPADM

## 2018-02-15 RX ORDER — LIDOCAINE HYDROCHLORIDE 10 MG/ML
1 INJECTION, SOLUTION EPIDURAL; INFILTRATION; INTRACAUDAL; PERINEURAL
Status: COMPLETED | OUTPATIENT
Start: 2018-02-15 | End: 2018-02-15

## 2018-02-15 RX ORDER — SODIUM CHLORIDE, SODIUM LACTATE, POTASSIUM CHLORIDE, CALCIUM CHLORIDE 600; 310; 30; 20 MG/100ML; MG/100ML; MG/100ML; MG/100ML
INJECTION, SOLUTION INTRAVENOUS CONTINUOUS
Status: DISCONTINUED | OUTPATIENT
Start: 2018-02-15 | End: 2018-02-15 | Stop reason: HOSPADM

## 2018-02-15 RX ORDER — SODIUM CHLORIDE 0.9 % (FLUSH) 0.9 %
10 SYRINGE (ML) INJECTION PRN
Status: DISCONTINUED | OUTPATIENT
Start: 2018-02-15 | End: 2018-02-15 | Stop reason: HOSPADM

## 2018-02-15 RX ADMIN — SODIUM CHLORIDE, POTASSIUM CHLORIDE, SODIUM LACTATE AND CALCIUM CHLORIDE: 600; 310; 30; 20 INJECTION, SOLUTION INTRAVENOUS at 07:34

## 2018-02-15 RX ADMIN — LIDOCAINE HYDROCHLORIDE: 10 INJECTION, SOLUTION EPIDURAL; INFILTRATION; INTRACAUDAL; PERINEURAL at 07:33

## 2018-02-15 ASSESSMENT — LIFESTYLE VARIABLES: SMOKING_STATUS: 1

## 2018-02-15 ASSESSMENT — PULMONARY FUNCTION TESTS
PIF_VALUE: 1
PIF_VALUE: 2
PIF_VALUE: 2
PIF_VALUE: 1
PIF_VALUE: 2
PIF_VALUE: 1

## 2018-02-15 ASSESSMENT — PAIN - FUNCTIONAL ASSESSMENT: PAIN_FUNCTIONAL_ASSESSMENT: 0-10

## 2018-02-15 NOTE — OP NOTE
normal Z line and GE junction    Recommendations: - Recommend gastric emptying study next  Follow up with Wannetta Duane, PA in GI clinic in 4 weeks, Call [phone] to scheduled for follow-up appointment.     Fabricio Atkinson MD  [facility]

## 2018-02-15 NOTE — H&P
Skin: Skin is warm and dry. No rash noted. No erythema. No pallor. Psychiatric: She has a normal mood and affect. Her behavior is normal. Thought content normal.   Nursing note and vitals reviewed.        Assessment:      1. Irritable bowel syndrome, unspecified type      2. GERD with esophagitis  87265 - [facility] UPPER GI ENDOSCOPY,DIAGNOSIS         Plan:      -Discussed lifestyle modification including weight loss, exercise, elevating head of bed, avoiding NSAID's/ETOH/caffeine/chocolate/acidic food and drink, avoid large meals and eating 2-3 hours prior to bedtime     -Increase Protonix 40mg PO BID for 4 weeks     -Change Bentyl 20mg PO QID for cramping     -Due to nature of symptoms and previous history of H pylori gastritis patient would benefit from EGD to evaluate upper GI tract.   Well informed of testing procedure and risks/benefits and does wish to proceed     -EGD to be performed in OR due to body habitus/BMI>50            Orders Placed This Encounter   Procedures    40313 - [facility] UPPER GI ENDOSCOPY,DIAGNOSIS      Encounter Medications         Orders Placed This Encounter   Medications    pantoprazole (PROTONIX) 40 MG tablet       Sig: Take 1 tablet by mouth 2 times daily (before meals)       Dispense:  60 tablet       Refill:  1    dicyclomine (BENTYL) 20 MG tablet       Sig: Take 1 tablet by mouth every 6 hours       Dispense:  120 tablet       Refill:  3            Follow up:  Call or return to clinic prn if these symptoms worsen or fail to improve as anticipated.        ABRAHAM Deins

## 2018-02-22 ENCOUNTER — OFFICE VISIT (OUTPATIENT)
Dept: FAMILY MEDICINE CLINIC | Age: 33
End: 2018-02-22
Payer: COMMERCIAL

## 2018-02-22 VITALS
WEIGHT: 293 LBS | BODY MASS INDEX: 45.99 KG/M2 | SYSTOLIC BLOOD PRESSURE: 106 MMHG | OXYGEN SATURATION: 98 % | DIASTOLIC BLOOD PRESSURE: 68 MMHG | HEIGHT: 67 IN | HEART RATE: 85 BPM | TEMPERATURE: 97.4 F

## 2018-02-22 DIAGNOSIS — H65.01 RIGHT ACUTE SEROUS OTITIS MEDIA, RECURRENCE NOT SPECIFIED: ICD-10-CM

## 2018-02-22 DIAGNOSIS — F41.1 GENERALIZED ANXIETY DISORDER: ICD-10-CM

## 2018-02-22 DIAGNOSIS — G43.909 MIGRAINE WITHOUT STATUS MIGRAINOSUS, NOT INTRACTABLE, UNSPECIFIED MIGRAINE TYPE: ICD-10-CM

## 2018-02-22 DIAGNOSIS — J01.40 ACUTE NON-RECURRENT PANSINUSITIS: Primary | ICD-10-CM

## 2018-02-22 DIAGNOSIS — F32.A DEPRESSION, UNSPECIFIED DEPRESSION TYPE: ICD-10-CM

## 2018-02-22 DIAGNOSIS — J20.9 ACUTE BRONCHITIS, UNSPECIFIED ORGANISM: ICD-10-CM

## 2018-02-22 LAB
INFLUENZA A ANTIBODY: NEGATIVE
INFLUENZA B ANTIBODY: NEGATIVE

## 2018-02-22 PROCEDURE — G8417 CALC BMI ABV UP PARAM F/U: HCPCS | Performed by: NURSE PRACTITIONER

## 2018-02-22 PROCEDURE — G8427 DOCREV CUR MEDS BY ELIG CLIN: HCPCS | Performed by: NURSE PRACTITIONER

## 2018-02-22 PROCEDURE — 99213 OFFICE O/P EST LOW 20 MIN: CPT | Performed by: NURSE PRACTITIONER

## 2018-02-22 PROCEDURE — 87804 INFLUENZA ASSAY W/OPTIC: CPT | Performed by: NURSE PRACTITIONER

## 2018-02-22 PROCEDURE — 4004F PT TOBACCO SCREEN RCVD TLK: CPT | Performed by: NURSE PRACTITIONER

## 2018-02-22 PROCEDURE — G8484 FLU IMMUNIZE NO ADMIN: HCPCS | Performed by: NURSE PRACTITIONER

## 2018-02-22 RX ORDER — CEFDINIR 300 MG/1
300 CAPSULE ORAL 2 TIMES DAILY
Qty: 20 CAPSULE | Refills: 0 | Status: SHIPPED | OUTPATIENT
Start: 2018-02-22 | End: 2018-03-04

## 2018-02-22 RX ORDER — AMITRIPTYLINE HYDROCHLORIDE 50 MG/1
TABLET, FILM COATED ORAL
Refills: 2 | COMMUNITY
Start: 2018-01-22 | End: 2018-03-29 | Stop reason: ALTCHOICE

## 2018-02-22 RX ORDER — SERTRALINE HYDROCHLORIDE 100 MG/1
100 TABLET, FILM COATED ORAL DAILY
Qty: 30 TABLET | Refills: 3 | Status: SHIPPED | OUTPATIENT
Start: 2018-02-22 | End: 2018-03-14 | Stop reason: SDUPTHER

## 2018-02-22 RX ORDER — BUTALBITAL, ACETAMINOPHEN AND CAFFEINE 50; 325; 40 MG/1; MG/1; MG/1
1 TABLET ORAL EVERY 4 HOURS PRN
Qty: 48 TABLET | Refills: 0 | Status: SHIPPED | OUTPATIENT
Start: 2018-02-22 | End: 2018-03-29 | Stop reason: SDUPTHER

## 2018-02-22 ASSESSMENT — ENCOUNTER SYMPTOMS
SORE THROAT: 1
COUGH: 1
WHEEZING: 0
RHINORRHEA: 0
SINUS PAIN: 1
SHORTNESS OF BREATH: 1
SINUS PRESSURE: 1

## 2018-02-22 NOTE — PROGRESS NOTES
Subjective  Chief Complaint   Patient presents with    Cough     states cough, sore throat, nausea on and off last 2 weeks.  Discuss Medications     would like to increase zoloft dose. Cough   This is a recurrent problem. The current episode started 1 to 4 weeks ago. The problem has been gradually worsening. The problem occurs every few minutes. The cough is non-productive. Associated symptoms include chills, ear pain, a fever (yesterday morning 101), headaches, myalgias, a sore throat, shortness of breath and sweats. Pertinent negatives include no chest pain, nasal congestion, rhinorrhea or wheezing. Associated symptoms comments: Felt like she was going to pass out this morning. . The symptoms are aggravated by lying down. She has tried OTC cough suppressant (tylenol, motrin) for the symptoms. The treatment provided mild relief. Her past medical history is significant for bronchitis and pneumonia. Says the zoloft is working, but would like to go up a little bit because she feels that it could do better.      Patient Active Problem List    Diagnosis Date Noted    Fibromyalgia     Vertigo 10/29/2013    Depression 10/29/2013    Coagulopathy ([address] 10/29/2013    IBS (irritable bowel syndrome) 07/31/2012    Status post cholecystectomy 07/31/2012    Nicotine dependence 07/31/2012    Malabsorption syndrome 06/08/2012    Family history of cancer 06/08/2012    Asthma     Generalized anxiety disorder     GERD (gastroesophageal reflux disease)     Migraine     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HCC)     PCOS (polycystic ovarian syndrome)     Elevated blood pressure reading     Human papilloma virus infection     Dyslipidemia     Obesity      Past Medical History:   Diagnosis Date    Anxiety     Asthma     CAD (coronary artery disease)     Tachycardia    Depression 10/29/2013    Dyslipidemia     Elevated blood pressure     Fibromyalgia     Fingernail abnormalities     right middle fingernail deformity    Generalized anxiety disorder     GERD (gastroesophageal reflux disease)     HPV (human papillomavirus)     Hypertension     IBS (irritable bowel syndrome) 2012    Migraine     Obesity     Pap smear for cervical cancer screening     - normal, pcos    PCOS (polycystic ovarian syndrome)     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[facility]     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     Past Surgical History:   Procedure Laterality Date    ARM SURGERY Left     ulna shortening    CARPAL TUNNEL RELEASE Bilateral      SECTION      CHOLECYSTECTOMY      [place] EGD TRANSORAL BIOPSY SINGLE/MULTIPLE N/A 2/15/2018    EGD ESOPHAGOGASTRODUODENOSCOPY performed by Luciano Molina MD at [address] ARTHROSCOPY Left     WRIST ARTHROSCOPY Left      Family History   Problem Relation Age of Onset    Arthritis Mother     Depression Mother     Depression Father     Diabetes Other     Cancer Other     Stroke Other     Obesity Other     Diabetes Maternal Aunt     Diabetes Maternal Uncle     Cancer Maternal Grandmother      colon    Cancer Paternal Grandfather      Social History     Social History    Marital status:      Spouse name: N/A    Number of children: N/A    Years of education: N/A     Social History Main Topics    Smoking status: Current Every Day Smoker     Packs/day: 0.30     Years: 17.00     Types: Cigarettes    Smokeless tobacco: Never Used    Alcohol use No    Drug use: No    Sexual activity: No     Other Topics Concern    None     Social History Narrative    None     Current Outpatient Prescriptions on File Prior to Visit   Medication Sig Dispense Refill    cyclobenzaprine (FLEXERIL) 10 MG tablet Take 1 tablet by mouth 3 times daily as needed for Muscle spasms 30 tablet 0    pantoprazole (PROTONIX) 40 MG tablet Take 1 tablet by mouth 2 times daily (before meals) 60 tablet 1    dicyclomine (BENTYL) 20 MG tablet Take 1 tablet by mouth every 6 hours 120 tablet 3    promethazine (PHENERGAN) 25 MG tablet Take 1 tablet by mouth every 6 hours as needed for Nausea 15 tablet 0    metoprolol tartrate (LOPRESSOR) 50 MG tablet TAKE 1 TABLET BY MOUTH TWICE DAILY 60 tablet 5    loratadine (CLARITIN) 10 MG capsule Take 1 capsule by mouth daily 30 capsule 5    docusate sodium (COLACE) 100 MG capsule Take 1 capsule by mouth daily as needed for Constipation 20 capsule 1    folic acid (FOLVITE) 1 MG tablet       diclofenac (VOLTAREN) 75 MG EC tablet TAKE 1 TABLET BY MOUTH TWICE DAILY with food or milk  0     No current facility-administered medications on file prior to visit. Allergies   Allergen Reactions    Iodides     Lamotrigine Other (See Comments)     Bruising, aggression       Review of Systems   Constitutional: Positive for chills, diaphoresis, fatigue and fever (yesterday morning 101). HENT: Positive for congestion, ear pain, sinus pain, sinus pressure and sore throat. Negative for rhinorrhea. Respiratory: Positive for cough and shortness of breath. Negative for wheezing. Cardiovascular: Negative for chest pain, palpitations and leg swelling. Musculoskeletal: Positive for myalgias. Neurological: Positive for headaches. Objective  Vitals:    02/22/18 1054   BP: 106/68   Site: Left Arm   Position: Sitting   Cuff Size: Large Adult   Pulse: 85   Temp: 97.4 °F (36.3 °C)   TempSrc: Tympanic   SpO2: 98%   Weight: (!) 324 lb (147 kg)   Height: 5' 7\" (1.702 m)     Physical Exam   Constitutional: She is oriented to person, place, and time. She appears well-developed and well-nourished. No distress. HENT:   Head: Normocephalic and atraumatic. Right Ear: External ear and ear canal normal. Tympanic membrane is erythematous and bulging. A middle ear effusion is present. Left Ear: Tympanic membrane, external ear and ear canal normal.   Nose: Mucosal edema and rhinorrhea present. Right sinus exhibits maxillary sinus tenderness and frontal sinus tenderness.  Left sinus exhibits

## 2018-02-28 ENCOUNTER — TELEPHONE (OUTPATIENT)
Dept: FAMILY MEDICINE CLINIC | Age: 33
End: 2018-02-28

## 2018-03-12 ENCOUNTER — TELEPHONE (OUTPATIENT)
Dept: FAMILY MEDICINE CLINIC | Age: 33
End: 2018-03-12

## 2018-03-12 RX ORDER — AMOXICILLIN AND CLAVULANATE POTASSIUM 875; 125 MG/1; MG/1
1 TABLET, FILM COATED ORAL 2 TIMES DAILY
Qty: 20 TABLET | Refills: 0 | Status: SHIPPED | OUTPATIENT
Start: 2018-03-12 | End: 2018-03-22

## 2018-03-12 RX ORDER — METHYLPREDNISOLONE 4 MG/1
TABLET ORAL
Qty: 21 TABLET | Refills: 0 | Status: SHIPPED | OUTPATIENT
Start: 2018-03-12 | End: 2018-03-18

## 2018-03-14 DIAGNOSIS — F32.A DEPRESSION, UNSPECIFIED DEPRESSION TYPE: ICD-10-CM

## 2018-03-14 DIAGNOSIS — M79.7 FIBROMYALGIA: ICD-10-CM

## 2018-03-14 DIAGNOSIS — G43.909 MIGRAINE WITHOUT STATUS MIGRAINOSUS, NOT INTRACTABLE, UNSPECIFIED MIGRAINE TYPE: ICD-10-CM

## 2018-03-14 DIAGNOSIS — F41.1 GENERALIZED ANXIETY DISORDER: ICD-10-CM

## 2018-03-14 DIAGNOSIS — R11.0 NAUSEA: ICD-10-CM

## 2018-03-14 RX ORDER — SERTRALINE HYDROCHLORIDE 100 MG/1
100 TABLET, FILM COATED ORAL DAILY
Qty: 30 TABLET | Refills: 3 | Status: SHIPPED | OUTPATIENT
Start: 2018-03-14 | End: 2018-03-23 | Stop reason: DRUGHIGH

## 2018-03-14 RX ORDER — METOPROLOL TARTRATE 50 MG/1
TABLET, FILM COATED ORAL
Qty: 60 TABLET | Refills: 3 | OUTPATIENT
Start: 2018-03-14

## 2018-03-14 RX ORDER — CYCLOBENZAPRINE HCL 10 MG
10 TABLET ORAL 3 TIMES DAILY PRN
Qty: 30 TABLET | Refills: 2 | OUTPATIENT
Start: 2018-03-14

## 2018-03-14 RX ORDER — PROMETHAZINE HYDROCHLORIDE 25 MG/1
25 TABLET ORAL EVERY 6 HOURS PRN
Qty: 15 TABLET | Refills: 0 | Status: SHIPPED | OUTPATIENT
Start: 2018-03-14 | End: 2018-05-22 | Stop reason: SDUPTHER

## 2018-03-14 RX ORDER — LORATADINE 10 MG/1
10 CAPSULE, LIQUID FILLED ORAL DAILY
Qty: 30 CAPSULE | Refills: 5 | Status: SHIPPED | OUTPATIENT
Start: 2018-03-14 | End: 2018-03-23 | Stop reason: SDUPTHER

## 2018-03-14 RX ORDER — BUTALBITAL, ACETAMINOPHEN AND CAFFEINE 50; 325; 40 MG/1; MG/1; MG/1
1 TABLET ORAL EVERY 4 HOURS PRN
Qty: 48 TABLET | Refills: 0 | OUTPATIENT
Start: 2018-03-14

## 2018-03-14 NOTE — TELEPHONE ENCOUNTER
pt requesting refill on Claritin. Please approve or deny this refill request. The order is pended. Thank you. LOV 2/22/2018    Next Visit Date:  No future appointments.

## 2018-03-20 RX ORDER — DOCUSATE SODIUM 100 MG/1
100 CAPSULE, LIQUID FILLED ORAL DAILY PRN
Qty: 20 CAPSULE | Refills: 1 | Status: SHIPPED | OUTPATIENT
Start: 2018-03-20 | End: 2018-03-22 | Stop reason: SDUPTHER

## 2018-03-20 RX ORDER — DICYCLOMINE HCL 20 MG
20 TABLET ORAL EVERY 6 HOURS
Qty: 120 TABLET | Refills: 3 | Status: SHIPPED | OUTPATIENT
Start: 2018-03-20 | End: 2018-05-23 | Stop reason: SDUPTHER

## 2018-03-22 DIAGNOSIS — F41.1 GENERALIZED ANXIETY DISORDER: ICD-10-CM

## 2018-03-22 DIAGNOSIS — F32.A DEPRESSION, UNSPECIFIED DEPRESSION TYPE: ICD-10-CM

## 2018-03-22 DIAGNOSIS — M79.7 FIBROMYALGIA: ICD-10-CM

## 2018-03-22 DIAGNOSIS — R11.0 NAUSEA: ICD-10-CM

## 2018-03-22 DIAGNOSIS — G43.909 MIGRAINE WITHOUT STATUS MIGRAINOSUS, NOT INTRACTABLE, UNSPECIFIED MIGRAINE TYPE: ICD-10-CM

## 2018-03-22 DIAGNOSIS — K21.9 GASTROESOPHAGEAL REFLUX DISEASE WITHOUT ESOPHAGITIS: Primary | ICD-10-CM

## 2018-03-22 RX ORDER — DOCUSATE SODIUM 100 MG/1
100 CAPSULE, LIQUID FILLED ORAL DAILY PRN
Qty: 20 CAPSULE | Refills: 1 | Status: SHIPPED | OUTPATIENT
Start: 2018-03-22 | End: 2018-05-23 | Stop reason: SDUPTHER

## 2018-03-23 DIAGNOSIS — F41.1 GENERALIZED ANXIETY DISORDER: ICD-10-CM

## 2018-03-23 DIAGNOSIS — G43.909 MIGRAINE WITHOUT STATUS MIGRAINOSUS, NOT INTRACTABLE, UNSPECIFIED MIGRAINE TYPE: ICD-10-CM

## 2018-03-23 DIAGNOSIS — F32.A DEPRESSION, UNSPECIFIED DEPRESSION TYPE: ICD-10-CM

## 2018-03-23 DIAGNOSIS — M79.7 FIBROMYALGIA: ICD-10-CM

## 2018-03-23 DIAGNOSIS — R11.0 NAUSEA: ICD-10-CM

## 2018-03-23 RX ORDER — LORATADINE 10 MG/1
10 CAPSULE, LIQUID FILLED ORAL DAILY
Qty: 30 CAPSULE | Refills: 5 | OUTPATIENT
Start: 2018-03-23

## 2018-03-23 RX ORDER — CYCLOBENZAPRINE HCL 10 MG
10 TABLET ORAL 3 TIMES DAILY PRN
Qty: 30 TABLET | Refills: 0 | Status: SHIPPED | OUTPATIENT
Start: 2018-03-23 | End: 2018-05-22 | Stop reason: SDUPTHER

## 2018-03-23 RX ORDER — SERTRALINE HYDROCHLORIDE 100 MG/1
100 TABLET, FILM COATED ORAL DAILY
Qty: 30 TABLET | Refills: 3 | OUTPATIENT
Start: 2018-03-23

## 2018-03-23 RX ORDER — BUTALBITAL, ACETAMINOPHEN AND CAFFEINE 50; 325; 40 MG/1; MG/1; MG/1
1 TABLET ORAL EVERY 4 HOURS PRN
Qty: 48 TABLET | Refills: 0 | OUTPATIENT
Start: 2018-03-23

## 2018-03-23 RX ORDER — SERTRALINE HYDROCHLORIDE 100 MG/1
100 TABLET, FILM COATED ORAL DAILY
Qty: 30 TABLET | Refills: 3 | Status: CANCELLED | OUTPATIENT
Start: 2018-03-23

## 2018-03-23 RX ORDER — CYCLOBENZAPRINE HCL 10 MG
10 TABLET ORAL 3 TIMES DAILY PRN
Qty: 30 TABLET | Refills: 0 | OUTPATIENT
Start: 2018-03-23

## 2018-03-23 RX ORDER — PROMETHAZINE HYDROCHLORIDE 25 MG/1
25 TABLET ORAL EVERY 6 HOURS PRN
Qty: 15 TABLET | Refills: 0 | OUTPATIENT
Start: 2018-03-23

## 2018-03-23 RX ORDER — LORATADINE 10 MG/1
10 CAPSULE, LIQUID FILLED ORAL DAILY
Qty: 30 CAPSULE | Refills: 5 | Status: SHIPPED | OUTPATIENT
Start: 2018-03-23 | End: 2018-05-22 | Stop reason: SDUPTHER

## 2018-03-29 ENCOUNTER — OFFICE VISIT (OUTPATIENT)
Dept: FAMILY MEDICINE CLINIC | Age: 33
End: 2018-03-29
Payer: COMMERCIAL

## 2018-03-29 VITALS
DIASTOLIC BLOOD PRESSURE: 88 MMHG | BODY MASS INDEX: 45.99 KG/M2 | HEIGHT: 67 IN | SYSTOLIC BLOOD PRESSURE: 122 MMHG | HEART RATE: 78 BPM | TEMPERATURE: 96 F | WEIGHT: 293 LBS | OXYGEN SATURATION: 98 %

## 2018-03-29 DIAGNOSIS — L30.9 DERMATITIS: ICD-10-CM

## 2018-03-29 DIAGNOSIS — G43.909 MIGRAINE WITHOUT STATUS MIGRAINOSUS, NOT INTRACTABLE, UNSPECIFIED MIGRAINE TYPE: ICD-10-CM

## 2018-03-29 DIAGNOSIS — F32.A DEPRESSION, UNSPECIFIED DEPRESSION TYPE: Primary | ICD-10-CM

## 2018-03-29 DIAGNOSIS — D68.9 COAGULOPATHY (HCC): ICD-10-CM

## 2018-03-29 DIAGNOSIS — D68.59 THROMBOPHILIA (HCC): ICD-10-CM

## 2018-03-29 DIAGNOSIS — F41.1 GENERALIZED ANXIETY DISORDER: ICD-10-CM

## 2018-03-29 PROCEDURE — G8427 DOCREV CUR MEDS BY ELIG CLIN: HCPCS | Performed by: NURSE PRACTITIONER

## 2018-03-29 PROCEDURE — G8417 CALC BMI ABV UP PARAM F/U: HCPCS | Performed by: NURSE PRACTITIONER

## 2018-03-29 PROCEDURE — 4004F PT TOBACCO SCREEN RCVD TLK: CPT | Performed by: NURSE PRACTITIONER

## 2018-03-29 PROCEDURE — 99214 OFFICE O/P EST MOD 30 MIN: CPT | Performed by: NURSE PRACTITIONER

## 2018-03-29 PROCEDURE — G8484 FLU IMMUNIZE NO ADMIN: HCPCS | Performed by: NURSE PRACTITIONER

## 2018-03-29 RX ORDER — FAMOTIDINE 20 MG/1
TABLET, FILM COATED ORAL
Refills: 3 | COMMUNITY
Start: 2018-03-08 | End: 2018-07-16 | Stop reason: ALTCHOICE

## 2018-03-29 RX ORDER — SERTRALINE HYDROCHLORIDE 25 MG/1
25 TABLET, FILM COATED ORAL DAILY
Qty: 30 TABLET | Refills: 3 | Status: SHIPPED | OUTPATIENT
Start: 2018-03-29 | End: 2018-05-22 | Stop reason: SDUPTHER

## 2018-03-29 RX ORDER — SERTRALINE HYDROCHLORIDE 100 MG/1
100 TABLET, FILM COATED ORAL DAILY
Qty: 30 TABLET | Refills: 3 | Status: SHIPPED | OUTPATIENT
Start: 2018-03-29 | End: 2018-05-22 | Stop reason: SDUPTHER

## 2018-03-29 RX ORDER — BUTALBITAL, ACETAMINOPHEN AND CAFFEINE 50; 325; 40 MG/1; MG/1; MG/1
1 TABLET ORAL EVERY 4 HOURS PRN
Qty: 48 TABLET | Refills: 0 | Status: SHIPPED | OUTPATIENT
Start: 2018-03-29 | End: 2018-06-01 | Stop reason: ALTCHOICE

## 2018-03-29 ASSESSMENT — ENCOUNTER SYMPTOMS
NAIL CHANGES: 0
COUGH: 0
RHINORRHEA: 0
SHORTNESS OF BREATH: 0

## 2018-04-06 ENCOUNTER — TELEPHONE (OUTPATIENT)
Dept: FAMILY MEDICINE CLINIC | Age: 33
End: 2018-04-06

## 2018-04-12 RX ORDER — PANTOPRAZOLE SODIUM 40 MG/1
TABLET, DELAYED RELEASE ORAL
Qty: 60 TABLET | Refills: 0 | Status: SHIPPED | OUTPATIENT
Start: 2018-04-12 | End: 2018-08-02 | Stop reason: SDUPTHER

## 2018-05-22 DIAGNOSIS — F32.A DEPRESSION, UNSPECIFIED DEPRESSION TYPE: ICD-10-CM

## 2018-05-22 DIAGNOSIS — F41.1 GENERALIZED ANXIETY DISORDER: ICD-10-CM

## 2018-05-22 DIAGNOSIS — M79.7 FIBROMYALGIA: ICD-10-CM

## 2018-05-22 DIAGNOSIS — R11.0 NAUSEA: ICD-10-CM

## 2018-05-22 DIAGNOSIS — G43.909 MIGRAINE WITHOUT STATUS MIGRAINOSUS, NOT INTRACTABLE, UNSPECIFIED MIGRAINE TYPE: ICD-10-CM

## 2018-05-23 RX ORDER — DOCUSATE SODIUM 100 MG/1
100 CAPSULE, LIQUID FILLED ORAL DAILY PRN
Qty: 20 CAPSULE | Refills: 1 | Status: SHIPPED | OUTPATIENT
Start: 2018-05-23 | End: 2018-06-20 | Stop reason: SDUPTHER

## 2018-05-23 RX ORDER — DICYCLOMINE HCL 20 MG
20 TABLET ORAL EVERY 6 HOURS
Qty: 120 TABLET | Refills: 3 | Status: SHIPPED | OUTPATIENT
Start: 2018-05-23 | End: 2018-07-31 | Stop reason: SDUPTHER

## 2018-05-24 RX ORDER — SERTRALINE HYDROCHLORIDE 100 MG/1
100 TABLET, FILM COATED ORAL DAILY
Qty: 30 TABLET | Refills: 3 | Status: SHIPPED | OUTPATIENT
Start: 2018-05-24 | End: 2018-06-01 | Stop reason: ALTCHOICE

## 2018-05-24 RX ORDER — SERTRALINE HYDROCHLORIDE 25 MG/1
25 TABLET, FILM COATED ORAL DAILY
Qty: 30 TABLET | Refills: 3 | Status: SHIPPED | OUTPATIENT
Start: 2018-05-24 | End: 2018-06-01 | Stop reason: ALTCHOICE

## 2018-05-24 RX ORDER — CYCLOBENZAPRINE HCL 10 MG
10 TABLET ORAL 3 TIMES DAILY PRN
Qty: 30 TABLET | Refills: 0 | Status: SHIPPED | OUTPATIENT
Start: 2018-05-24 | End: 2018-06-20 | Stop reason: SDUPTHER

## 2018-05-24 RX ORDER — PROMETHAZINE HYDROCHLORIDE 25 MG/1
25 TABLET ORAL EVERY 6 HOURS PRN
Qty: 15 TABLET | Refills: 0 | Status: SHIPPED | OUTPATIENT
Start: 2018-05-24 | End: 2018-06-01 | Stop reason: SDUPTHER

## 2018-05-24 RX ORDER — METOPROLOL TARTRATE 50 MG/1
TABLET, FILM COATED ORAL
Qty: 60 TABLET | Refills: 5 | Status: SHIPPED | OUTPATIENT
Start: 2018-05-24 | End: 2018-07-02 | Stop reason: DRUGHIGH

## 2018-05-24 RX ORDER — BUTALBITAL, ACETAMINOPHEN AND CAFFEINE 50; 325; 40 MG/1; MG/1; MG/1
1 TABLET ORAL EVERY 4 HOURS PRN
Qty: 48 TABLET | Refills: 0 | OUTPATIENT
Start: 2018-05-24

## 2018-05-24 RX ORDER — LORATADINE 10 MG/1
10 CAPSULE, LIQUID FILLED ORAL DAILY
Qty: 30 CAPSULE | Refills: 5 | Status: SHIPPED | OUTPATIENT
Start: 2018-05-24 | End: 2018-06-01 | Stop reason: ALTCHOICE

## 2018-06-01 ENCOUNTER — OFFICE VISIT (OUTPATIENT)
Dept: FAMILY MEDICINE CLINIC | Age: 33
End: 2018-06-01
Payer: COMMERCIAL

## 2018-06-01 VITALS
TEMPERATURE: 96.6 F | WEIGHT: 293 LBS | BODY MASS INDEX: 45.99 KG/M2 | HEIGHT: 67 IN | SYSTOLIC BLOOD PRESSURE: 118 MMHG | DIASTOLIC BLOOD PRESSURE: 96 MMHG | OXYGEN SATURATION: 97 % | HEART RATE: 94 BPM

## 2018-06-01 DIAGNOSIS — F41.1 GENERALIZED ANXIETY DISORDER: ICD-10-CM

## 2018-06-01 DIAGNOSIS — F32.A DEPRESSION, UNSPECIFIED DEPRESSION TYPE: Primary | ICD-10-CM

## 2018-06-01 DIAGNOSIS — M54.32 SCIATICA OF LEFT SIDE: ICD-10-CM

## 2018-06-01 DIAGNOSIS — I10 ESSENTIAL HYPERTENSION: ICD-10-CM

## 2018-06-01 DIAGNOSIS — N91.2 AMENORRHEA: ICD-10-CM

## 2018-06-01 DIAGNOSIS — L50.9 URTICARIA: ICD-10-CM

## 2018-06-01 DIAGNOSIS — R11.0 NAUSEA: ICD-10-CM

## 2018-06-01 LAB
ANION GAP SERPL CALCULATED.3IONS-SCNC: 13 MEQ/L (ref 7–13)
BUN BLDV-MCNC: 12 MG/DL (ref 6–20)
CALCIUM SERPL-MCNC: 9 MG/DL (ref 8.6–10.2)
CHLORIDE BLD-SCNC: 103 MEQ/L (ref 98–107)
CO2: 24 MEQ/L (ref 22–29)
CREAT SERPL-MCNC: 0.75 MG/DL (ref 0.5–0.9)
GFR AFRICAN AMERICAN: >60
GFR NON-AFRICAN AMERICAN: >60
GLUCOSE BLD-MCNC: 96 MG/DL (ref 74–109)
GONADOTROPIN, CHORIONIC (HCG) QUANT: <0.1 MIU/ML
POTASSIUM SERPL-SCNC: 4.3 MEQ/L (ref 3.5–5.1)
SODIUM BLD-SCNC: 140 MEQ/L (ref 132–144)

## 2018-06-01 PROCEDURE — 4004F PT TOBACCO SCREEN RCVD TLK: CPT | Performed by: NURSE PRACTITIONER

## 2018-06-01 PROCEDURE — 99214 OFFICE O/P EST MOD 30 MIN: CPT | Performed by: NURSE PRACTITIONER

## 2018-06-01 PROCEDURE — G8427 DOCREV CUR MEDS BY ELIG CLIN: HCPCS | Performed by: NURSE PRACTITIONER

## 2018-06-01 PROCEDURE — G8417 CALC BMI ABV UP PARAM F/U: HCPCS | Performed by: NURSE PRACTITIONER

## 2018-06-01 RX ORDER — VENLAFAXINE HYDROCHLORIDE 75 MG/1
75 CAPSULE, EXTENDED RELEASE ORAL DAILY
Qty: 30 CAPSULE | Refills: 3 | Status: SHIPPED | OUTPATIENT
Start: 2018-06-01 | End: 2018-07-26 | Stop reason: SDUPTHER

## 2018-06-01 RX ORDER — PROMETHAZINE HYDROCHLORIDE 25 MG/1
25 TABLET ORAL EVERY 6 HOURS PRN
Qty: 15 TABLET | Refills: 0 | Status: SHIPPED | OUTPATIENT
Start: 2018-06-01 | End: 2018-06-20 | Stop reason: SDUPTHER

## 2018-06-01 RX ORDER — IBUPROFEN 800 MG/1
TABLET ORAL
Refills: 0 | COMMUNITY
Start: 2018-05-23 | End: 2018-11-01 | Stop reason: ALTCHOICE

## 2018-06-01 RX ORDER — METHYLPREDNISOLONE 4 MG/1
TABLET ORAL
COMMUNITY
Start: 2018-05-31 | End: 2018-06-20

## 2018-06-01 RX ORDER — AMITRIPTYLINE HYDROCHLORIDE 25 MG/1
150 TABLET, FILM COATED ORAL
COMMUNITY
Start: 2018-05-31 | End: 2018-11-01 | Stop reason: ALTCHOICE

## 2018-06-01 RX ORDER — FEXOFENADINE HCL 180 MG/1
180 TABLET ORAL DAILY
Qty: 30 TABLET | Refills: 3 | Status: SHIPPED | OUTPATIENT
Start: 2018-06-01 | End: 2018-07-16 | Stop reason: ALTCHOICE

## 2018-06-01 RX ORDER — NARATRIPTAN 2.5 MG/1
TABLET ORAL
COMMUNITY
Start: 2018-05-31 | End: 2018-11-01 | Stop reason: ALTCHOICE

## 2018-06-01 RX ORDER — HYDROCHLOROTHIAZIDE 12.5 MG/1
12.5 CAPSULE, GELATIN COATED ORAL DAILY
Qty: 30 CAPSULE | Refills: 3 | Status: SHIPPED | OUTPATIENT
Start: 2018-06-01 | End: 2018-07-16 | Stop reason: SINTOL

## 2018-06-01 ASSESSMENT — ENCOUNTER SYMPTOMS
BACK PAIN: 1
SHORTNESS OF BREATH: 0
COUGH: 0

## 2018-06-13 ENCOUNTER — TELEPHONE (OUTPATIENT)
Dept: FAMILY MEDICINE CLINIC | Age: 33
End: 2018-06-13

## 2018-06-20 DIAGNOSIS — R11.0 NAUSEA: ICD-10-CM

## 2018-06-20 DIAGNOSIS — M79.7 FIBROMYALGIA: ICD-10-CM

## 2018-06-20 DIAGNOSIS — K21.9 GASTROESOPHAGEAL REFLUX DISEASE WITHOUT ESOPHAGITIS: Primary | ICD-10-CM

## 2018-06-20 RX ORDER — DOCUSATE SODIUM 100 MG/1
100 CAPSULE, LIQUID FILLED ORAL DAILY PRN
Qty: 20 CAPSULE | Refills: 1 | Status: CANCELLED | OUTPATIENT
Start: 2018-06-20

## 2018-06-20 RX ORDER — CYCLOBENZAPRINE HCL 10 MG
10 TABLET ORAL 3 TIMES DAILY PRN
Qty: 30 TABLET | Refills: 0 | Status: SHIPPED | OUTPATIENT
Start: 2018-06-20 | End: 2018-07-26 | Stop reason: SDUPTHER

## 2018-06-20 RX ORDER — DOCUSATE SODIUM 100 MG/1
100 CAPSULE, LIQUID FILLED ORAL DAILY PRN
Qty: 20 CAPSULE | Refills: 1 | Status: SHIPPED | OUTPATIENT
Start: 2018-06-20 | End: 2018-07-08 | Stop reason: SDUPTHER

## 2018-06-20 RX ORDER — PROMETHAZINE HYDROCHLORIDE 25 MG/1
25 TABLET ORAL EVERY 6 HOURS PRN
Qty: 15 TABLET | Refills: 0 | Status: SHIPPED | OUTPATIENT
Start: 2018-06-20 | End: 2018-07-26 | Stop reason: SDUPTHER

## 2018-07-02 ENCOUNTER — OFFICE VISIT (OUTPATIENT)
Dept: FAMILY MEDICINE CLINIC | Age: 33
End: 2018-07-02
Payer: COMMERCIAL

## 2018-07-02 VITALS
SYSTOLIC BLOOD PRESSURE: 138 MMHG | DIASTOLIC BLOOD PRESSURE: 76 MMHG | OXYGEN SATURATION: 98 % | BODY MASS INDEX: 45.99 KG/M2 | WEIGHT: 293 LBS | HEIGHT: 67 IN | HEART RATE: 97 BPM | TEMPERATURE: 97.8 F

## 2018-07-02 DIAGNOSIS — I10 ESSENTIAL HYPERTENSION: ICD-10-CM

## 2018-07-02 DIAGNOSIS — J01.40 ACUTE PANSINUSITIS, RECURRENCE NOT SPECIFIED: ICD-10-CM

## 2018-07-02 DIAGNOSIS — J20.9 ACUTE BRONCHITIS, UNSPECIFIED ORGANISM: Primary | ICD-10-CM

## 2018-07-02 PROCEDURE — 99214 OFFICE O/P EST MOD 30 MIN: CPT | Performed by: NURSE PRACTITIONER

## 2018-07-02 PROCEDURE — G8427 DOCREV CUR MEDS BY ELIG CLIN: HCPCS | Performed by: NURSE PRACTITIONER

## 2018-07-02 PROCEDURE — 4004F PT TOBACCO SCREEN RCVD TLK: CPT | Performed by: NURSE PRACTITIONER

## 2018-07-02 PROCEDURE — G8417 CALC BMI ABV UP PARAM F/U: HCPCS | Performed by: NURSE PRACTITIONER

## 2018-07-02 RX ORDER — METOPROLOL TARTRATE 50 MG/1
TABLET, FILM COATED ORAL
Qty: 60 TABLET | Refills: 5 | Status: SHIPPED
Start: 2018-07-02 | End: 2018-07-16 | Stop reason: DRUGHIGH

## 2018-07-02 RX ORDER — CEFDINIR 300 MG/1
300 CAPSULE ORAL 2 TIMES DAILY
Qty: 20 CAPSULE | Refills: 0 | Status: SHIPPED | OUTPATIENT
Start: 2018-07-02 | End: 2018-07-12

## 2018-07-02 ASSESSMENT — ENCOUNTER SYMPTOMS
SINUS PAIN: 1
COUGH: 1
SINUS PRESSURE: 1
SHORTNESS OF BREATH: 0
RHINORRHEA: 1
SORE THROAT: 0

## 2018-07-02 NOTE — PROGRESS NOTES
Subjective  Chief Complaint   Patient presents with   [facility]     pt states that she is here for a 4 week f/u for BP.  URI     also stating that she has a URI. cough started 2 weeks ago head congestion 2 days ago. Pt here to follow up on HTN. Says she has been checking it at home and been getting anywhere from 140s-160s/110. URI    This is a new problem. The current episode started 1 to 4 weeks ago (cough for a couple of weeks). The problem has been gradually worsening. There has been no fever. Associated symptoms include congestion, coughing, ear pain, rhinorrhea, sinus pain and sneezing. Pertinent negatives include no chest pain or sore throat. Associated symptoms comments: Stuffiness for a couple days. She has tried antihistamine and decongestant for the symptoms. The treatment provided no relief.          Patient Active Problem List    Diagnosis Date Noted    Fibromyalgia     Vertigo 10/29/2013    Depression 10/29/2013    Coagulopathy ([address] 10/29/2013    IBS (irritable bowel syndrome) 07/31/2012    Status post cholecystectomy 07/31/2012    Nicotine dependence 07/31/2012    Malabsorption syndrome 06/08/2012    Family history of cancer 06/08/2012    Asthma     Generalized anxiety disorder     GERD (gastroesophageal reflux disease)     Migraine     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HCC)     PCOS (polycystic ovarian syndrome)     Elevated blood pressure reading     Human papilloma virus infection     Dyslipidemia     Obesity      Past Medical History:   Diagnosis Date    Anxiety     Asthma     CAD (coronary artery disease)     Tachycardia    Depression 10/29/2013    Dyslipidemia     Elevated blood pressure     Fibromyalgia     Fingernail abnormalities     right middle fingernail deformity    Generalized anxiety disorder     GERD (gastroesophageal reflux disease)     HPV (human papillomavirus)     Hypertension     IBS (irritable bowel syndrome) 7/31/2012    Migraine     Obesity     Pap fexofenadine (ALLEGRA ALLERGY) 180 MG tablet Take 1 tablet by mouth daily 30 tablet 3    hydrochlorothiazide (MICROZIDE) 12.5 MG capsule Take 1 capsule by mouth daily 30 capsule 3    dicyclomine (BENTYL) 20 MG tablet Take 1 tablet by mouth every 6 hours 120 tablet 3    pantoprazole (PROTONIX) 40 MG tablet TAKE 1 TABLET BY MOUTH TWICE DAILY before meals 60 tablet 0    famotidine (PEPCID) 20 MG tablet TAKE 1 TABLET BY MOUTH TWICE DAILY  3    ibuprofen (ADVIL;MOTRIN) 800 MG tablet TAKE 1 TABLET THREE TIMES DAILY AS NEEDED FOR PAIN  0    folic acid (FOLVITE) 1 MG tablet        No current facility-administered medications on file prior to visit. Allergies   Allergen Reactions    Iodides     Lamotrigine Other (See Comments)     Bruising, aggression       Review of Systems   Constitutional: Negative for chills, diaphoresis, fatigue and fever. HENT: Positive for congestion, ear pain, rhinorrhea, sinus pain, sinus pressure and sneezing. Negative for sore throat. Respiratory: Positive for cough. Negative for shortness of breath. Cardiovascular: Negative for chest pain, palpitations and leg swelling. Objective  Vitals:    07/02/18 1556   BP: 138/76   Site: Right Arm   Position: Sitting   Cuff Size: Large Adult   Pulse: 97   Temp: 97.8 °F (36.6 °C)   TempSrc: Tympanic   SpO2: 98%   Weight: (!) 322 lb (146.1 kg)   Height: 5' 7\" (1.702 m)     Physical Exam   Constitutional: She is oriented to person, place, and time. She appears well-developed and well-nourished. No distress. HENT:   Head: Normocephalic and atraumatic. Right Ear: Tympanic membrane, external ear and ear canal normal.   Left Ear: Tympanic membrane, external ear and ear canal normal.   Nose: Right sinus exhibits maxillary sinus tenderness and frontal sinus tenderness. Left sinus exhibits maxillary sinus tenderness and frontal sinus tenderness. Mouth/Throat: Oropharynx is clear and moist. No oropharyngeal exudate.    Eyes: Conjunctivae

## 2018-07-08 DIAGNOSIS — K21.9 GASTROESOPHAGEAL REFLUX DISEASE WITHOUT ESOPHAGITIS: ICD-10-CM

## 2018-07-09 ENCOUNTER — TELEPHONE (OUTPATIENT)
Dept: FAMILY MEDICINE CLINIC | Age: 33
End: 2018-07-09

## 2018-07-09 RX ORDER — DOCUSATE SODIUM 100 MG/1
CAPSULE, LIQUID FILLED ORAL
Qty: 20 CAPSULE | Refills: 0 | Status: SHIPPED | OUTPATIENT
Start: 2018-07-09 | End: 2018-07-31 | Stop reason: SDUPTHER

## 2018-07-09 NOTE — TELEPHONE ENCOUNTER
Please note: phone call was very choppy and had a lot of static, so it was very hard to understand, but I believe that Beatriz Vargas was calling to give you a courtesy call to advise you that this pt is currently in admission.

## 2018-07-09 NOTE — TELEPHONE ENCOUNTER
Pt call ing asking to speak with [facility] nurse. Stated that she went to [facility] and is currently admitted. She said that her potassium is low they told her possibly from her BP medication. SHe wants to speak with the nurse to ask to get this changed. I advised pt to let the hospital address what ever was going on and to schedule a hospital follow up with [facility] when discharged.

## 2018-07-10 NOTE — TELEPHONE ENCOUNTER
Yes will need to schedule follow up to address this when she is discharged. Hospital should make any necessary adjustments while she is admitted.

## 2018-07-16 ENCOUNTER — OFFICE VISIT (OUTPATIENT)
Dept: FAMILY MEDICINE CLINIC | Age: 33
End: 2018-07-16
Payer: COMMERCIAL

## 2018-07-16 VITALS
BODY MASS INDEX: 45.99 KG/M2 | OXYGEN SATURATION: 98 % | WEIGHT: 293 LBS | HEART RATE: 66 BPM | TEMPERATURE: 97.2 F | DIASTOLIC BLOOD PRESSURE: 78 MMHG | SYSTOLIC BLOOD PRESSURE: 110 MMHG | HEIGHT: 67 IN

## 2018-07-16 DIAGNOSIS — M51.26 HERNIATED NUCLEUS PULPOSUS, L4-5: Primary | ICD-10-CM

## 2018-07-16 DIAGNOSIS — E87.6 HYPOKALEMIA: ICD-10-CM

## 2018-07-16 DIAGNOSIS — J18.9 PNEUMONIA OF RIGHT UPPER LOBE DUE TO INFECTIOUS ORGANISM: ICD-10-CM

## 2018-07-16 DIAGNOSIS — R05.9 COUGH: ICD-10-CM

## 2018-07-16 LAB
ANION GAP SERPL CALCULATED.3IONS-SCNC: 10 MEQ/L (ref 7–13)
BUN BLDV-MCNC: 14 MG/DL (ref 6–20)
CALCIUM SERPL-MCNC: 8.5 MG/DL (ref 8.6–10.2)
CHLORIDE BLD-SCNC: 103 MEQ/L (ref 98–107)
CO2: 27 MEQ/L (ref 22–29)
CREAT SERPL-MCNC: 0.84 MG/DL (ref 0.5–0.9)
GFR AFRICAN AMERICAN: >60
GFR NON-AFRICAN AMERICAN: >60
GLUCOSE BLD-MCNC: 62 MG/DL (ref 74–109)
POTASSIUM SERPL-SCNC: 4.6 MEQ/L (ref 3.5–5.1)
SODIUM BLD-SCNC: 140 MEQ/L (ref 132–144)

## 2018-07-16 PROCEDURE — G8417 CALC BMI ABV UP PARAM F/U: HCPCS | Performed by: NURSE PRACTITIONER

## 2018-07-16 PROCEDURE — 99214 OFFICE O/P EST MOD 30 MIN: CPT | Performed by: NURSE PRACTITIONER

## 2018-07-16 PROCEDURE — 4004F PT TOBACCO SCREEN RCVD TLK: CPT | Performed by: NURSE PRACTITIONER

## 2018-07-16 PROCEDURE — G8427 DOCREV CUR MEDS BY ELIG CLIN: HCPCS | Performed by: NURSE PRACTITIONER

## 2018-07-16 RX ORDER — PROMETHAZINE HYDROCHLORIDE AND CODEINE PHOSPHATE 6.25; 1 MG/5ML; MG/5ML
5 SYRUP ORAL EVERY 4 HOURS PRN
Qty: 200 ML | Refills: 0 | Status: SHIPPED | OUTPATIENT
Start: 2018-07-16 | End: 2018-07-23

## 2018-07-16 RX ORDER — LISINOPRIL 10 MG/1
10 TABLET ORAL DAILY
COMMUNITY
End: 2018-07-26 | Stop reason: DRUGHIGH

## 2018-07-16 RX ORDER — METOPROLOL TARTRATE 50 MG/1
TABLET, FILM COATED ORAL
Qty: 60 TABLET | Refills: 5 | Status: SHIPPED
Start: 2018-07-16 | End: 2018-07-26 | Stop reason: SDUPTHER

## 2018-07-16 ASSESSMENT — ENCOUNTER SYMPTOMS
BACK PAIN: 1
COUGH: 1
SHORTNESS OF BREATH: 1

## 2018-07-16 NOTE — PROGRESS NOTES
 Asthma     Generalized anxiety disorder     GERD (gastroesophageal reflux disease)     Migraine     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HCC)     PCOS (polycystic ovarian syndrome)     Elevated blood pressure reading     Human papilloma virus infection     Dyslipidemia     Obesity      Past Surgical History:   Procedure Laterality Date    ARM SURGERY Left     ulna shortening    CARPAL TUNNEL RELEASE Bilateral      SECTION      CHOLECYSTECTOMY      [place] EGD TRANSORAL BIOPSY SINGLE/MULTIPLE N/A 2/15/2018    EGD ESOPHAGOGASTRODUODENOSCOPY performed by Nithya Dale MD at [address] ARTHROSCOPY Left     WRIST ARTHROSCOPY Left      Family History   Problem Relation Age of Onset    Arthritis Mother     Depression Mother     Depression Father     Diabetes Other     Cancer Other     Stroke Other     Obesity Other     Diabetes Maternal Aunt     Diabetes Maternal Uncle     Cancer Maternal Grandmother         colon    Cancer Paternal Grandfather      Social History     Social History    Marital status:      Spouse name: N/A    Number of children: N/A    Years of education: N/A     Social History Main Topics    Smoking status: Current Every Day Smoker     Packs/day: 0.30     Years: 17.00     Types: Cigarettes    Smokeless tobacco: Never Used    Alcohol use No    Drug use: No    Sexual activity: No     Other Topics Concern    None     Social History Narrative    None     Current Outpatient Prescriptions on File Prior to Visit   Medication Sig Dispense Refill     MG capsule Take 1 capsule daily as needed for constipation 20 capsule 0    cyclobenzaprine (FLEXERIL) 10 MG tablet Take 1 tablet by mouth 3 times daily as needed for Muscle spasms 30 tablet 0    promethazine (PHENERGAN) 25 MG tablet Take 1 tablet by mouth every 6 hours as needed for Nausea 15 tablet 0    amitriptyline (ELAVIL) 25 MG tablet 150 mg       ibuprofen (ADVIL;MOTRIN) 800 MG tablet TAKE 1 TABLET THREE TIMES DAILY AS NEEDED FOR PAIN  0    naratriptan (AMERGE) 2.5 MG tablet       venlafaxine (EFFEXOR XR) 75 MG extended release capsule Take 1 capsule by mouth daily (Patient taking differently: Take 150 mg by mouth daily ) 30 capsule 3    dicyclomine (BENTYL) 20 MG tablet Take 1 tablet by mouth every 6 hours 120 tablet 3    pantoprazole (PROTONIX) 40 MG tablet TAKE 1 TABLET BY MOUTH TWICE DAILY before meals 60 tablet 0    folic acid (FOLVITE) 1 MG tablet        No current facility-administered medications on file prior to visit. Allergies   Allergen Reactions    Iodides     Lamotrigine Other (See Comments)     Bruising, aggression       Review of Systems   Constitutional: Negative for chills, diaphoresis, fatigue and fever. Respiratory: Positive for cough and shortness of breath. Cardiovascular: Negative for chest pain, palpitations and leg swelling. Musculoskeletal: Positive for arthralgias and back pain. Neurological: Negative for dizziness and headaches. Objective  Vitals:    07/16/18 1206   BP: 110/78   Pulse: 66   Temp: 97.2 °F (36.2 °C)   TempSrc: Tympanic   SpO2: 98%   Weight: (!) 327 lb (148.3 kg)   Height: 5' 7\" (1.702 m)     Physical Exam   Constitutional: She is oriented to person, place, and time. She appears well-developed and well-nourished. No distress. HENT:   Head: Normocephalic and atraumatic. Right Ear: External ear normal.   Left Ear: External ear normal.   Mouth/Throat: Oropharynx is clear and moist.   Cardiovascular: Normal rate, regular rhythm and normal heart sounds. Pulmonary/Chest: Effort normal and breath sounds normal. No respiratory distress. Neurological: She is alert and oriented to person, place, and time. Skin: Skin is warm and dry. No rash noted. She is not diaphoretic. No erythema. No pallor. Psychiatric: She has a normal mood and affect. Her behavior is normal. Judgment and thought content normal.       Assessment & Plan     Diagnosis Orders   1.

## 2018-07-17 ENCOUNTER — TELEPHONE (OUTPATIENT)
Dept: FAMILY MEDICINE CLINIC | Age: 33
End: 2018-07-17

## 2018-07-18 ENCOUNTER — TELEPHONE (OUTPATIENT)
Dept: FAMILY MEDICINE CLINIC | Age: 33
End: 2018-07-18

## 2018-07-26 ENCOUNTER — TELEPHONE (OUTPATIENT)
Dept: FAMILY MEDICINE CLINIC | Age: 33
End: 2018-07-26

## 2018-07-26 DIAGNOSIS — R11.0 NAUSEA: ICD-10-CM

## 2018-07-26 DIAGNOSIS — F32.A DEPRESSION, UNSPECIFIED DEPRESSION TYPE: ICD-10-CM

## 2018-07-26 DIAGNOSIS — M79.7 FIBROMYALGIA: ICD-10-CM

## 2018-07-26 DIAGNOSIS — I10 ESSENTIAL HYPERTENSION: Primary | ICD-10-CM

## 2018-07-26 DIAGNOSIS — F41.1 GENERALIZED ANXIETY DISORDER: ICD-10-CM

## 2018-07-26 RX ORDER — VENLAFAXINE HYDROCHLORIDE 75 MG/1
75 CAPSULE, EXTENDED RELEASE ORAL DAILY
Qty: 30 CAPSULE | Refills: 3 | Status: SHIPPED | OUTPATIENT
Start: 2018-07-26 | End: 2018-08-02 | Stop reason: DRUGHIGH

## 2018-07-26 RX ORDER — LISINOPRIL 20 MG/1
20 TABLET ORAL DAILY
Qty: 30 TABLET | Refills: 3 | Status: SHIPPED | OUTPATIENT
Start: 2018-07-26 | End: 2018-10-12 | Stop reason: SDUPTHER

## 2018-07-26 RX ORDER — CYCLOBENZAPRINE HCL 10 MG
10 TABLET ORAL 3 TIMES DAILY PRN
Qty: 30 TABLET | Refills: 0 | Status: SHIPPED | OUTPATIENT
Start: 2018-07-26 | End: 2018-10-12 | Stop reason: SDUPTHER

## 2018-07-26 RX ORDER — METOPROLOL TARTRATE 50 MG/1
TABLET, FILM COATED ORAL
Qty: 60 TABLET | Refills: 5 | Status: SHIPPED | OUTPATIENT
Start: 2018-07-26 | End: 2018-10-12 | Stop reason: SDUPTHER

## 2018-07-26 RX ORDER — PROMETHAZINE HYDROCHLORIDE 25 MG/1
25 TABLET ORAL EVERY 6 HOURS PRN
Qty: 15 TABLET | Refills: 0 | Status: SHIPPED | OUTPATIENT
Start: 2018-07-26 | End: 2018-08-02 | Stop reason: SDUPTHER

## 2018-07-31 DIAGNOSIS — K21.9 GASTROESOPHAGEAL REFLUX DISEASE WITHOUT ESOPHAGITIS: ICD-10-CM

## 2018-07-31 RX ORDER — DICYCLOMINE HCL 20 MG
20 TABLET ORAL EVERY 6 HOURS
Qty: 120 TABLET | Refills: 3 | Status: SHIPPED | OUTPATIENT
Start: 2018-07-31 | End: 2018-10-12 | Stop reason: SDUPTHER

## 2018-07-31 RX ORDER — DOCUSATE SODIUM 100 MG/1
100 CAPSULE, LIQUID FILLED ORAL NIGHTLY
Qty: 20 CAPSULE | Refills: 0 | Status: SHIPPED | OUTPATIENT
Start: 2018-07-31 | End: 2018-10-12 | Stop reason: SDUPTHER

## 2018-08-02 ENCOUNTER — OFFICE VISIT (OUTPATIENT)
Dept: FAMILY MEDICINE CLINIC | Age: 33
End: 2018-08-02
Payer: COMMERCIAL

## 2018-08-02 VITALS
SYSTOLIC BLOOD PRESSURE: 118 MMHG | OXYGEN SATURATION: 98 % | HEART RATE: 96 BPM | BODY MASS INDEX: 45.99 KG/M2 | DIASTOLIC BLOOD PRESSURE: 88 MMHG | WEIGHT: 293 LBS | HEIGHT: 67 IN

## 2018-08-02 DIAGNOSIS — Z82.49 FAMILY HISTORY OF HEART DISEASE: ICD-10-CM

## 2018-08-02 DIAGNOSIS — R42 DIZZINESS: ICD-10-CM

## 2018-08-02 DIAGNOSIS — K21.9 GASTROESOPHAGEAL REFLUX DISEASE WITHOUT ESOPHAGITIS: ICD-10-CM

## 2018-08-02 DIAGNOSIS — K58.9 IRRITABLE BOWEL SYNDROME, UNSPECIFIED TYPE: ICD-10-CM

## 2018-08-02 DIAGNOSIS — R11.0 NAUSEA: ICD-10-CM

## 2018-08-02 DIAGNOSIS — I10 RESISTANT HYPERTENSION: Primary | ICD-10-CM

## 2018-08-02 DIAGNOSIS — E16.2 HYPOGLYCEMIA: ICD-10-CM

## 2018-08-02 PROCEDURE — 99214 OFFICE O/P EST MOD 30 MIN: CPT | Performed by: NURSE PRACTITIONER

## 2018-08-02 PROCEDURE — G8427 DOCREV CUR MEDS BY ELIG CLIN: HCPCS | Performed by: NURSE PRACTITIONER

## 2018-08-02 PROCEDURE — 4004F PT TOBACCO SCREEN RCVD TLK: CPT | Performed by: NURSE PRACTITIONER

## 2018-08-02 PROCEDURE — G8417 CALC BMI ABV UP PARAM F/U: HCPCS | Performed by: NURSE PRACTITIONER

## 2018-08-02 RX ORDER — VENLAFAXINE HYDROCHLORIDE 150 MG/1
150 CAPSULE, EXTENDED RELEASE ORAL DAILY
COMMUNITY
End: 2018-11-01 | Stop reason: DRUGHIGH

## 2018-08-02 RX ORDER — GABAPENTIN 300 MG/1
600 CAPSULE ORAL 2 TIMES DAILY
COMMUNITY
End: 2019-08-15

## 2018-08-02 RX ORDER — PROMETHAZINE HYDROCHLORIDE 25 MG/1
25 TABLET ORAL EVERY 6 HOURS PRN
Qty: 30 TABLET | Refills: 0 | Status: SHIPPED | OUTPATIENT
Start: 2018-08-02 | End: 2018-10-12 | Stop reason: SDUPTHER

## 2018-08-02 RX ORDER — PANTOPRAZOLE SODIUM 40 MG/1
40 TABLET, DELAYED RELEASE ORAL DAILY
Qty: 60 TABLET | Refills: 1 | Status: SHIPPED | OUTPATIENT
Start: 2018-08-02 | End: 2018-10-12 | Stop reason: SDUPTHER

## 2018-08-02 ASSESSMENT — ENCOUNTER SYMPTOMS
SHORTNESS OF BREATH: 0
BACK PAIN: 1
COUGH: 0

## 2018-08-02 NOTE — LETTER
[address] [facility], [address], [address]  [address]  Phone: [phone]  Fax: [address] [facility] [address], APRN - CNP        August 2, 2018    Anayeli Lowe  [address] [place] [address]    To whom it may concern:    Anayeli Lowe was seen in office on 8/2/18. If you have any questions or concerns, please don't hesitate to call.     Sincerely,        NICO Bautista - CNP   [facility]

## 2018-08-02 NOTE — PROGRESS NOTES
Medication Sig Dispense Refill    dicyclomine (BENTYL) 20 MG tablet Take 1 tablet by mouth every 6 hours 120 tablet 3    docusate sodium (DOK) 100 MG capsule Take 1 capsule by mouth nightly 20 capsule 0    metoprolol tartrate (LOPRESSOR) 50 MG tablet TAKE 1 TABLETS BY MOUTH TWICE DAILY 60 tablet 5    cyclobenzaprine (FLEXERIL) 10 MG tablet Take 1 tablet by mouth 3 times daily as needed for Muscle spasms 30 tablet 0    lisinopril (PRINIVIL;ZESTRIL) 20 MG tablet Take 1 tablet by mouth daily 30 tablet 3    amitriptyline (ELAVIL) 25 MG tablet 325 mg       folic acid (FOLVITE) 1 MG tablet       ibuprofen (ADVIL;MOTRIN) 800 MG tablet TAKE 1 TABLET THREE TIMES DAILY AS NEEDED FOR PAIN  0    naratriptan (AMERGE) 2.5 MG tablet        No current facility-administered medications on file prior to visit. Allergies   Allergen Reactions    Iodides     Lamotrigine Other (See Comments)     Bruising, aggression       Review of Systems   Constitutional: Negative for chills, diaphoresis, fatigue and fever. HENT: Negative for congestion. Respiratory: Negative for cough and shortness of breath. Cardiovascular: Negative for chest pain, palpitations and leg swelling. Musculoskeletal: Positive for back pain. Neurological: Positive for dizziness. Negative for headaches. Objective  Vitals:    08/02/18 1326   BP: 118/88   Site: Right Arm   Position: Sitting   Cuff Size: Large Adult   Pulse: 96   SpO2: 98%   Weight: (!) 321 lb (145.6 kg)   Height: 5' 7\" (1.702 m)     Physical Exam   Constitutional: She is oriented to person, place, and time. She appears well-developed and well-nourished. No distress. HENT:   Head: Normocephalic and atraumatic. Right Ear: External ear normal.   Left Ear: External ear normal.   Cardiovascular: Normal rate, regular rhythm and normal heart sounds. Pulmonary/Chest: Effort normal and breath sounds normal. No respiratory distress.    Neurological: She is alert and oriented to 1 tablet by mouth daily     Dispense:  60 tablet     Refill:  1       Return in about 3 months (around 11/2/2018) for htn, anxiety.     Rebecca Martinez, NICO - CNP

## 2018-08-03 RX ORDER — IBUPROFEN 800 MG/1
TABLET ORAL
Qty: 120 TABLET | Refills: 0 | OUTPATIENT
Start: 2018-08-03

## 2018-08-03 NOTE — TELEPHONE ENCOUNTER
As discussed during our visit yesterday, I am not comfortable prescribing her ibuprofen with the stomach issues she has going on.

## 2018-10-12 DIAGNOSIS — K21.9 GASTROESOPHAGEAL REFLUX DISEASE WITHOUT ESOPHAGITIS: ICD-10-CM

## 2018-10-12 DIAGNOSIS — I10 ESSENTIAL HYPERTENSION: ICD-10-CM

## 2018-10-12 DIAGNOSIS — R11.0 NAUSEA: ICD-10-CM

## 2018-10-12 DIAGNOSIS — M79.7 FIBROMYALGIA: ICD-10-CM

## 2018-10-12 RX ORDER — METOPROLOL TARTRATE 50 MG/1
TABLET, FILM COATED ORAL
Qty: 60 TABLET | Refills: 5 | Status: SHIPPED | OUTPATIENT
Start: 2018-10-12 | End: 2018-11-06 | Stop reason: SDUPTHER

## 2018-10-12 RX ORDER — LISINOPRIL 20 MG/1
20 TABLET ORAL DAILY
Qty: 30 TABLET | Refills: 3 | Status: SHIPPED | OUTPATIENT
Start: 2018-10-12 | End: 2018-11-06 | Stop reason: SDUPTHER

## 2018-10-12 RX ORDER — CYCLOBENZAPRINE HCL 10 MG
10 TABLET ORAL 3 TIMES DAILY PRN
Qty: 30 TABLET | Refills: 0 | Status: SHIPPED | OUTPATIENT
Start: 2018-10-12 | End: 2018-11-06 | Stop reason: SDUPTHER

## 2018-10-12 RX ORDER — PANTOPRAZOLE SODIUM 40 MG/1
40 TABLET, DELAYED RELEASE ORAL DAILY
Qty: 60 TABLET | Refills: 1 | Status: SHIPPED | OUTPATIENT
Start: 2018-10-12 | End: 2018-11-01 | Stop reason: SDUPTHER

## 2018-10-12 RX ORDER — PROMETHAZINE HYDROCHLORIDE 25 MG/1
25 TABLET ORAL EVERY 6 HOURS PRN
Qty: 30 TABLET | Refills: 0 | Status: SHIPPED | OUTPATIENT
Start: 2018-10-12 | End: 2018-11-06 | Stop reason: SDUPTHER

## 2018-10-12 RX ORDER — DICYCLOMINE HCL 20 MG
20 TABLET ORAL EVERY 6 HOURS
Qty: 120 TABLET | Refills: 3 | Status: SHIPPED | OUTPATIENT
Start: 2018-10-12 | End: 2018-11-06 | Stop reason: SDUPTHER

## 2018-10-12 RX ORDER — DOCUSATE SODIUM 100 MG/1
100 CAPSULE, LIQUID FILLED ORAL NIGHTLY
Qty: 20 CAPSULE | Refills: 0 | Status: SHIPPED | OUTPATIENT
Start: 2018-10-12 | End: 2018-11-06 | Stop reason: SDUPTHER

## 2018-11-01 ENCOUNTER — OFFICE VISIT (OUTPATIENT)
Dept: FAMILY MEDICINE CLINIC | Age: 33
End: 2018-11-01
Payer: COMMERCIAL

## 2018-11-01 VITALS
TEMPERATURE: 96.5 F | DIASTOLIC BLOOD PRESSURE: 72 MMHG | HEIGHT: 67 IN | SYSTOLIC BLOOD PRESSURE: 98 MMHG | OXYGEN SATURATION: 98 % | BODY MASS INDEX: 45.99 KG/M2 | HEART RATE: 83 BPM | WEIGHT: 293 LBS

## 2018-11-01 DIAGNOSIS — R05.3 CHRONIC COUGH: ICD-10-CM

## 2018-11-01 DIAGNOSIS — Z11.4 ENCOUNTER FOR SCREENING FOR HIV: ICD-10-CM

## 2018-11-01 DIAGNOSIS — E28.2 PCOS (POLYCYSTIC OVARIAN SYNDROME): Primary | ICD-10-CM

## 2018-11-01 DIAGNOSIS — R53.83 FATIGUE, UNSPECIFIED TYPE: ICD-10-CM

## 2018-11-01 DIAGNOSIS — K21.9 GASTROESOPHAGEAL REFLUX DISEASE WITHOUT ESOPHAGITIS: ICD-10-CM

## 2018-11-01 DIAGNOSIS — Z23 INFLUENZA VACCINE NEEDED: ICD-10-CM

## 2018-11-01 LAB — VITAMIN D 25-HYDROXY: 22.6 NG/ML (ref 30–100)

## 2018-11-01 PROCEDURE — 4004F PT TOBACCO SCREEN RCVD TLK: CPT | Performed by: NURSE PRACTITIONER

## 2018-11-01 PROCEDURE — 99214 OFFICE O/P EST MOD 30 MIN: CPT | Performed by: NURSE PRACTITIONER

## 2018-11-01 PROCEDURE — 90688 IIV4 VACCINE SPLT 0.5 ML IM: CPT | Performed by: NURSE PRACTITIONER

## 2018-11-01 PROCEDURE — G8484 FLU IMMUNIZE NO ADMIN: HCPCS | Performed by: NURSE PRACTITIONER

## 2018-11-01 PROCEDURE — G8417 CALC BMI ABV UP PARAM F/U: HCPCS | Performed by: NURSE PRACTITIONER

## 2018-11-01 PROCEDURE — G8427 DOCREV CUR MEDS BY ELIG CLIN: HCPCS | Performed by: NURSE PRACTITIONER

## 2018-11-01 PROCEDURE — 90471 IMMUNIZATION ADMIN: CPT | Performed by: NURSE PRACTITIONER

## 2018-11-01 RX ORDER — QUETIAPINE FUMARATE 300 MG/1
300 TABLET, FILM COATED ORAL EVERY EVENING
COMMUNITY
End: 2019-08-15 | Stop reason: ALTCHOICE

## 2018-11-01 RX ORDER — VENLAFAXINE 37.5 MG/1
37.5 TABLET ORAL DAILY
COMMUNITY
End: 2019-08-15 | Stop reason: ALTCHOICE

## 2018-11-01 RX ORDER — VENLAFAXINE HYDROCHLORIDE 75 MG/1
75 CAPSULE, EXTENDED RELEASE ORAL DAILY
COMMUNITY
End: 2020-05-08 | Stop reason: ALTCHOICE

## 2018-11-01 RX ORDER — PANTOPRAZOLE SODIUM 40 MG/1
40 TABLET, DELAYED RELEASE ORAL DAILY
Qty: 60 TABLET | Refills: 1 | Status: SHIPPED | OUTPATIENT
Start: 2018-11-01 | End: 2018-11-06 | Stop reason: SDUPTHER

## 2018-11-01 ASSESSMENT — ENCOUNTER SYMPTOMS
COUGH: 1
SHORTNESS OF BREATH: 0
WHEEZING: 0

## 2018-11-01 NOTE — PROGRESS NOTES
10/29/2013    IBS (irritable bowel syndrome) 2012    Status post cholecystectomy 2012    Nicotine dependence 2012    Malabsorption syndrome 2012    Family history of cancer 2012    Asthma     Generalized anxiety disorder     GERD (gastroesophageal reflux disease)     Migraine     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HCC)     PCOS (polycystic ovarian syndrome)     Elevated blood pressure reading     Human papilloma virus infection     Dyslipidemia     Obesity      Past Surgical History:   Procedure Laterality Date    ARM SURGERY Left     ulna shortening    CARPAL TUNNEL RELEASE Bilateral      SECTION      CHOLECYSTECTOMY      LUMBAR DISC SURGERY  10/10/2018    DR Maira Mcguire [place] EGD TRANSORAL BIOPSY SINGLE/MULTIPLE N/A 2/15/2018    EGD ESOPHAGOGASTRODUODENOSCOPY performed by Louanna Holter, MD at [address] ARTHROSCOPY Left     SHOULDER ARTHROSCOPY Left 2017    Magda Pardo MD     WRIST ARTHROSCOPY Left      Family History   Problem Relation Age of Onset    Arthritis Mother     Depression Mother     Depression Father     Diabetes Other     Cancer Other     Stroke Other     Obesity Other     Diabetes Maternal Aunt     Diabetes Maternal Uncle     Cancer Maternal Grandmother         colon    Cancer Paternal Grandfather      Social History     Social History    Marital status:      Spouse name: N/A    Number of children: N/A    Years of education: N/A     Social History Main Topics    Smoking status: Current Every Day Smoker     Packs/day: 0.30     Years: 17.00     Types: Cigarettes    Smokeless tobacco: Never Used    Alcohol use No    Drug use: No    Sexual activity: No     Other Topics Concern    None     Social History Narrative    None     Current Outpatient Prescriptions on File Prior to Visit   Medication Sig Dispense Refill    promethazine (PHENERGAN) 25 MG tablet Take 1 tablet by mouth every 6 hours as needed for Nausea 30 tablet 0 mouth daily     Dispense:  60 tablet     Refill:  1       Return in about 3 months (around 2/1/2019) for htn. Patient advised to occasionally monitor blood pressure at home and call office if blood pressure consistently elevated >140/85. Continue with medications as ordered. Watch excess salt intake as it can contribute to elevations in blood pressure. Patient verbalized understanding. Pt will schedule with Dr. Lj Kemp to discuss PCOS. Pt will schedule with pulmonology due to her cough. Check labs as ordered. Side effects, adverse effects of the medication prescribed today, as well as treatment plan/ rationale and result expectations have been discussed with the patient who expresses understanding and desires to proceed. Close follow up to evaluate treatment results and for coordination of care. I have reviewed the patient's medical history in detail and updated the computerized patient record. As always, patient is advised that if symptoms worsen in any way they will proceed to the nearest emergency room.      NICO Beavers - CNP

## 2018-11-01 NOTE — PROGRESS NOTES
Vaccine Information Sheet, \"Influenza - Inactivated\"  given to Antonina Arreaga, or parent/legal guardian of  Antonina Arreaga and verbalized understanding. Patient responses:    Have you ever had a reaction to a flu vaccine? No  Are you able to eat eggs without adverse effects? Yes  Do you have any current illness? No  Have you ever had [place] Syndrome? No    Flu vaccine given per order. Please see immunization tab.

## 2018-11-03 LAB — HIV-1 AND HIV-2 ANTIBODIES: NEGATIVE

## 2018-11-06 DIAGNOSIS — I10 ESSENTIAL HYPERTENSION: ICD-10-CM

## 2018-11-06 DIAGNOSIS — K21.9 GASTROESOPHAGEAL REFLUX DISEASE WITHOUT ESOPHAGITIS: ICD-10-CM

## 2018-11-06 DIAGNOSIS — M79.7 FIBROMYALGIA: ICD-10-CM

## 2018-11-06 DIAGNOSIS — R11.0 NAUSEA: ICD-10-CM

## 2018-11-06 RX ORDER — DICYCLOMINE HCL 20 MG
20 TABLET ORAL EVERY 6 HOURS
Qty: 120 TABLET | Refills: 3 | Status: SHIPPED | OUTPATIENT
Start: 2018-11-06 | End: 2018-12-31 | Stop reason: SDUPTHER

## 2018-11-06 RX ORDER — DOCUSATE SODIUM 100 MG/1
100 CAPSULE, LIQUID FILLED ORAL NIGHTLY
Qty: 20 CAPSULE | Refills: 0 | Status: SHIPPED | OUTPATIENT
Start: 2018-11-06 | End: 2018-12-31 | Stop reason: SDUPTHER

## 2018-11-07 ENCOUNTER — TELEPHONE (OUTPATIENT)
Dept: GASTROENTEROLOGY | Age: 33
End: 2018-11-07

## 2018-11-07 RX ORDER — METOPROLOL TARTRATE 50 MG/1
TABLET, FILM COATED ORAL
Qty: 60 TABLET | Refills: 5 | Status: SHIPPED | OUTPATIENT
Start: 2018-11-07 | End: 2018-12-26 | Stop reason: SDUPTHER

## 2018-11-07 RX ORDER — PROMETHAZINE HYDROCHLORIDE 25 MG/1
25 TABLET ORAL EVERY 6 HOURS PRN
Qty: 30 TABLET | Refills: 0 | Status: SHIPPED | OUTPATIENT
Start: 2018-11-07 | End: 2018-12-26 | Stop reason: SDUPTHER

## 2018-11-07 RX ORDER — LISINOPRIL 20 MG/1
20 TABLET ORAL DAILY
Qty: 30 TABLET | Refills: 3 | Status: SHIPPED | OUTPATIENT
Start: 2018-11-07 | End: 2018-12-26 | Stop reason: SDUPTHER

## 2018-11-07 RX ORDER — PANTOPRAZOLE SODIUM 40 MG/1
40 TABLET, DELAYED RELEASE ORAL DAILY
Qty: 60 TABLET | Refills: 1 | Status: SHIPPED | OUTPATIENT
Start: 2018-11-07 | End: 2018-12-26 | Stop reason: SDUPTHER

## 2018-11-07 RX ORDER — CYCLOBENZAPRINE HCL 10 MG
10 TABLET ORAL 3 TIMES DAILY PRN
Qty: 30 TABLET | Refills: 0 | Status: SHIPPED | OUTPATIENT
Start: 2018-11-07 | End: 2018-12-06 | Stop reason: SDUPTHER

## 2018-11-12 RX ORDER — LORATADINE 10 MG/1
TABLET ORAL
Qty: 30 TABLET | Refills: 3 | Status: SHIPPED | OUTPATIENT
Start: 2018-11-12 | End: 2018-12-26 | Stop reason: SDUPTHER

## 2018-11-19 ENCOUNTER — HOSPITAL ENCOUNTER (OUTPATIENT)
Dept: NUCLEAR MEDICINE | Age: 33
Discharge: HOME | End: 2018-11-21
Payer: COMMERCIAL

## 2018-11-19 DIAGNOSIS — K31.89 RETAINED FOOD IN STOMACH: ICD-10-CM

## 2018-11-19 DIAGNOSIS — K21.9 GASTROESOPHAGEAL REFLUX DISEASE WITHOUT ESOPHAGITIS: ICD-10-CM

## 2018-12-06 DIAGNOSIS — M79.7 FIBROMYALGIA: ICD-10-CM

## 2018-12-07 RX ORDER — CYCLOBENZAPRINE HCL 10 MG
10 TABLET ORAL 3 TIMES DAILY PRN
Qty: 30 TABLET | Refills: 5 | Status: SHIPPED | OUTPATIENT
Start: 2018-12-07 | End: 2018-12-26 | Stop reason: SDUPTHER

## 2018-12-26 DIAGNOSIS — M79.7 FIBROMYALGIA: ICD-10-CM

## 2018-12-26 DIAGNOSIS — R11.0 NAUSEA: ICD-10-CM

## 2018-12-26 DIAGNOSIS — I10 ESSENTIAL HYPERTENSION: ICD-10-CM

## 2018-12-26 DIAGNOSIS — K21.9 GASTROESOPHAGEAL REFLUX DISEASE WITHOUT ESOPHAGITIS: ICD-10-CM

## 2018-12-26 RX ORDER — METOPROLOL TARTRATE 50 MG/1
TABLET, FILM COATED ORAL
Qty: 60 TABLET | Refills: 1 | Status: SHIPPED | OUTPATIENT
Start: 2018-12-26 | End: 2019-01-28 | Stop reason: SDUPTHER

## 2018-12-26 RX ORDER — PROMETHAZINE HYDROCHLORIDE 25 MG/1
25 TABLET ORAL EVERY 6 HOURS PRN
Qty: 30 TABLET | Refills: 0 | Status: SHIPPED | OUTPATIENT
Start: 2018-12-26 | End: 2019-01-24 | Stop reason: SDUPTHER

## 2018-12-26 RX ORDER — PANTOPRAZOLE SODIUM 40 MG/1
40 TABLET, DELAYED RELEASE ORAL DAILY
Qty: 60 TABLET | Refills: 1 | Status: SHIPPED | OUTPATIENT
Start: 2018-12-26 | End: 2019-01-28 | Stop reason: SDUPTHER

## 2018-12-26 RX ORDER — CYCLOBENZAPRINE HCL 10 MG
10 TABLET ORAL 3 TIMES DAILY PRN
Qty: 30 TABLET | Refills: 1 | Status: SHIPPED | OUTPATIENT
Start: 2018-12-26 | End: 2019-01-28 | Stop reason: SDUPTHER

## 2018-12-26 RX ORDER — LISINOPRIL 20 MG/1
20 TABLET ORAL DAILY
Qty: 30 TABLET | Refills: 1 | Status: SHIPPED | OUTPATIENT
Start: 2018-12-26 | End: 2019-01-28 | Stop reason: SDUPTHER

## 2018-12-26 RX ORDER — LORATADINE 10 MG/1
10 TABLET ORAL DAILY
Qty: 30 TABLET | Refills: 1 | Status: SHIPPED | OUTPATIENT
Start: 2018-12-26 | End: 2019-01-28 | Stop reason: SDUPTHER

## 2018-12-31 DIAGNOSIS — K21.9 GASTROESOPHAGEAL REFLUX DISEASE WITHOUT ESOPHAGITIS: ICD-10-CM

## 2018-12-31 RX ORDER — DOCUSATE SODIUM 100 MG/1
100 CAPSULE, LIQUID FILLED ORAL NIGHTLY
Qty: 20 CAPSULE | Refills: 0 | Status: SHIPPED | OUTPATIENT
Start: 2018-12-31 | End: 2019-01-28 | Stop reason: SDUPTHER

## 2018-12-31 RX ORDER — DICYCLOMINE HCL 20 MG
20 TABLET ORAL EVERY 6 HOURS
Qty: 120 TABLET | Refills: 3 | Status: SHIPPED | OUTPATIENT
Start: 2018-12-31 | End: 2019-01-28 | Stop reason: SDUPTHER

## 2019-01-24 ENCOUNTER — OFFICE VISIT (OUTPATIENT)
Dept: FAMILY MEDICINE CLINIC | Age: 34
End: 2019-01-24
Payer: COMMERCIAL

## 2019-01-24 VITALS
OXYGEN SATURATION: 98 % | SYSTOLIC BLOOD PRESSURE: 110 MMHG | TEMPERATURE: 97.5 F | BODY MASS INDEX: 45.99 KG/M2 | WEIGHT: 293 LBS | DIASTOLIC BLOOD PRESSURE: 70 MMHG | HEIGHT: 67 IN | HEART RATE: 90 BPM

## 2019-01-24 DIAGNOSIS — R11.0 NAUSEA: ICD-10-CM

## 2019-01-24 DIAGNOSIS — R53.83 FATIGUE, UNSPECIFIED TYPE: ICD-10-CM

## 2019-01-24 DIAGNOSIS — K21.9 GASTROESOPHAGEAL REFLUX DISEASE WITHOUT ESOPHAGITIS: ICD-10-CM

## 2019-01-24 DIAGNOSIS — Z13.1 DIABETES MELLITUS SCREENING: ICD-10-CM

## 2019-01-24 DIAGNOSIS — I10 ESSENTIAL HYPERTENSION: ICD-10-CM

## 2019-01-24 DIAGNOSIS — R53.83 FATIGUE, UNSPECIFIED TYPE: Primary | ICD-10-CM

## 2019-01-24 DIAGNOSIS — F32.A DEPRESSION, UNSPECIFIED DEPRESSION TYPE: ICD-10-CM

## 2019-01-24 DIAGNOSIS — E66.01 CLASS 3 SEVERE OBESITY WITH SERIOUS COMORBIDITY AND BODY MASS INDEX (BMI) OF 50.0 TO 59.9 IN ADULT, UNSPECIFIED OBESITY TYPE (HCC): ICD-10-CM

## 2019-01-24 LAB
ALT SERPL-CCNC: 9 U/L (ref 0–33)
ANION GAP SERPL CALCULATED.3IONS-SCNC: 14 MEQ/L (ref 7–13)
AST SERPL-CCNC: 15 U/L (ref 0–35)
BASOPHILS ABSOLUTE: 0.1 K/UL (ref 0–0.2)
BASOPHILS RELATIVE PERCENT: 0.8 %
BUN BLDV-MCNC: 8 MG/DL (ref 6–20)
CALCIUM SERPL-MCNC: 9 MG/DL (ref 8.6–10.2)
CHLORIDE BLD-SCNC: 101 MEQ/L (ref 98–107)
CO2: 27 MEQ/L (ref 22–29)
CREAT SERPL-MCNC: 0.91 MG/DL (ref 0.5–0.9)
EOSINOPHILS ABSOLUTE: 0.3 K/UL (ref 0–0.7)
EOSINOPHILS RELATIVE PERCENT: 3.7 %
FOLATE: 19.6 NG/ML (ref 7.3–26.1)
GFR AFRICAN AMERICAN: >60
GFR NON-AFRICAN AMERICAN: >60
GLUCOSE BLD-MCNC: 91 MG/DL (ref 74–109)
HBA1C MFR BLD: 5.1 % (ref 4.8–5.9)
HCT VFR BLD CALC: 43.2 % (ref 37–47)
HEMOGLOBIN: 14.4 G/DL (ref 12–16)
IRON SATURATION: 18 % (ref 11–46)
IRON: 49 UG/DL (ref 37–145)
LYMPHOCYTES ABSOLUTE: 2.4 K/UL (ref 1–4.8)
LYMPHOCYTES RELATIVE PERCENT: 33.4 %
MCH RBC QN AUTO: 28.7 PG (ref 27–31.3)
MCHC RBC AUTO-ENTMCNC: 33.4 % (ref 33–37)
MCV RBC AUTO: 85.9 FL (ref 82–100)
MONOCYTES ABSOLUTE: 0.5 K/UL (ref 0.2–0.8)
MONOCYTES RELATIVE PERCENT: 7.1 %
NEUTROPHILS ABSOLUTE: 4 K/UL (ref 1.4–6.5)
NEUTROPHILS RELATIVE PERCENT: 55 %
PDW BLD-RTO: 15.8 % (ref 11.5–14.5)
PLATELET # BLD: 292 K/UL (ref 130–400)
POTASSIUM SERPL-SCNC: 4.6 MEQ/L (ref 3.5–5.1)
RBC # BLD: 5.03 M/UL (ref 4.2–5.4)
SODIUM BLD-SCNC: 142 MEQ/L (ref 132–144)
TOTAL IRON BINDING CAPACITY: 270 UG/DL (ref 178–450)
TSH REFLEX: 1.06 UIU/ML (ref 0.27–4.2)
VITAMIN B-12: 449 PG/ML (ref 232–1245)
VITAMIN D 25-HYDROXY: 28 NG/ML (ref 30–100)
WBC # BLD: 7.2 K/UL (ref 4.8–10.8)

## 2019-01-24 PROCEDURE — G8482 FLU IMMUNIZE ORDER/ADMIN: HCPCS | Performed by: NURSE PRACTITIONER

## 2019-01-24 PROCEDURE — G8417 CALC BMI ABV UP PARAM F/U: HCPCS | Performed by: NURSE PRACTITIONER

## 2019-01-24 PROCEDURE — 99213 OFFICE O/P EST LOW 20 MIN: CPT | Performed by: NURSE PRACTITIONER

## 2019-01-24 PROCEDURE — 4004F PT TOBACCO SCREEN RCVD TLK: CPT | Performed by: NURSE PRACTITIONER

## 2019-01-24 PROCEDURE — G8427 DOCREV CUR MEDS BY ELIG CLIN: HCPCS | Performed by: NURSE PRACTITIONER

## 2019-01-24 RX ORDER — PROMETHAZINE HYDROCHLORIDE 25 MG/1
25 TABLET ORAL EVERY 6 HOURS PRN
Qty: 30 TABLET | Refills: 0 | Status: SHIPPED | OUTPATIENT
Start: 2019-01-24 | End: 2019-04-04 | Stop reason: SDUPTHER

## 2019-01-24 ASSESSMENT — ENCOUNTER SYMPTOMS
COUGH: 0
SHORTNESS OF BREATH: 0

## 2019-01-28 DIAGNOSIS — I10 ESSENTIAL HYPERTENSION: ICD-10-CM

## 2019-01-28 DIAGNOSIS — K21.9 GASTROESOPHAGEAL REFLUX DISEASE WITHOUT ESOPHAGITIS: ICD-10-CM

## 2019-01-28 DIAGNOSIS — M79.7 FIBROMYALGIA: ICD-10-CM

## 2019-01-28 RX ORDER — DICYCLOMINE HCL 20 MG
20 TABLET ORAL EVERY 6 HOURS
Qty: 120 TABLET | Refills: 3 | Status: SHIPPED | OUTPATIENT
Start: 2019-01-28 | End: 2019-04-04 | Stop reason: SDUPTHER

## 2019-01-28 RX ORDER — LORATADINE 10 MG/1
10 TABLET ORAL DAILY
Qty: 30 TABLET | Refills: 1 | Status: SHIPPED | OUTPATIENT
Start: 2019-01-28 | End: 2019-04-04 | Stop reason: SDUPTHER

## 2019-01-28 RX ORDER — DOCUSATE SODIUM 100 MG/1
100 CAPSULE, LIQUID FILLED ORAL NIGHTLY
Qty: 20 CAPSULE | Refills: 0 | Status: SHIPPED | OUTPATIENT
Start: 2019-01-28 | End: 2019-04-04 | Stop reason: SDUPTHER

## 2019-01-28 RX ORDER — METOPROLOL TARTRATE 50 MG/1
TABLET, FILM COATED ORAL
Qty: 60 TABLET | Refills: 1 | Status: SHIPPED | OUTPATIENT
Start: 2019-01-28 | End: 2019-04-04 | Stop reason: SDUPTHER

## 2019-01-28 RX ORDER — LISINOPRIL 20 MG/1
20 TABLET ORAL DAILY
Qty: 30 TABLET | Refills: 1 | Status: SHIPPED | OUTPATIENT
Start: 2019-01-28 | End: 2019-04-04 | Stop reason: SDUPTHER

## 2019-01-28 RX ORDER — PANTOPRAZOLE SODIUM 40 MG/1
40 TABLET, DELAYED RELEASE ORAL DAILY
Qty: 60 TABLET | Refills: 1 | Status: SHIPPED | OUTPATIENT
Start: 2019-01-28 | End: 2019-04-04 | Stop reason: SDUPTHER

## 2019-01-28 RX ORDER — CYCLOBENZAPRINE HCL 10 MG
10 TABLET ORAL 3 TIMES DAILY PRN
Qty: 30 TABLET | Refills: 1 | Status: SHIPPED | OUTPATIENT
Start: 2019-01-28 | End: 2019-04-04 | Stop reason: SDUPTHER

## 2019-02-14 DIAGNOSIS — S39.012A STRAIN OF LUMBAR REGION, INITIAL ENCOUNTER: Primary | ICD-10-CM

## 2019-02-14 RX ORDER — METHYLPREDNISOLONE 4 MG/1
TABLET ORAL
Qty: 21 TABLET | Refills: 0 | Status: SHIPPED | OUTPATIENT
Start: 2019-02-14 | End: 2019-02-20

## 2019-02-21 DIAGNOSIS — M54.40 ACUTE BILATERAL LOW BACK PAIN WITH SCIATICA, SCIATICA LATERALITY UNSPECIFIED: Primary | ICD-10-CM

## 2019-02-21 RX ORDER — IBUPROFEN 800 MG/1
TABLET ORAL
Qty: 60 TABLET | Refills: 0 | Status: SHIPPED | OUTPATIENT
Start: 2019-02-21 | End: 2019-04-04 | Stop reason: SDUPTHER

## 2019-04-04 DIAGNOSIS — M79.7 FIBROMYALGIA: ICD-10-CM

## 2019-04-04 DIAGNOSIS — M54.40 ACUTE BILATERAL LOW BACK PAIN WITH SCIATICA, SCIATICA LATERALITY UNSPECIFIED: ICD-10-CM

## 2019-04-04 DIAGNOSIS — R11.0 NAUSEA: ICD-10-CM

## 2019-04-04 DIAGNOSIS — K21.9 GASTROESOPHAGEAL REFLUX DISEASE WITHOUT ESOPHAGITIS: ICD-10-CM

## 2019-04-04 DIAGNOSIS — I10 ESSENTIAL HYPERTENSION: ICD-10-CM

## 2019-04-04 RX ORDER — LISINOPRIL 20 MG/1
20 TABLET ORAL DAILY
Qty: 30 TABLET | Refills: 1 | Status: SHIPPED | OUTPATIENT
Start: 2019-04-04 | End: 2019-05-06 | Stop reason: SDUPTHER

## 2019-04-04 RX ORDER — IBUPROFEN 800 MG/1
TABLET ORAL
Qty: 60 TABLET | Refills: 0 | Status: SHIPPED | OUTPATIENT
Start: 2019-04-04 | End: 2019-05-09 | Stop reason: SDUPTHER

## 2019-04-04 RX ORDER — METOPROLOL TARTRATE 50 MG/1
TABLET, FILM COATED ORAL
Qty: 60 TABLET | Refills: 1 | Status: SHIPPED | OUTPATIENT
Start: 2019-04-04 | End: 2019-05-09 | Stop reason: SDUPTHER

## 2019-04-04 RX ORDER — PROMETHAZINE HYDROCHLORIDE 25 MG/1
25 TABLET ORAL EVERY 6 HOURS PRN
Qty: 30 TABLET | Refills: 0 | Status: SHIPPED | OUTPATIENT
Start: 2019-04-04 | End: 2019-05-09 | Stop reason: SDUPTHER

## 2019-04-04 RX ORDER — DOCUSATE SODIUM 100 MG/1
100 CAPSULE, LIQUID FILLED ORAL NIGHTLY
Qty: 20 CAPSULE | Refills: 0 | Status: SHIPPED | OUTPATIENT
Start: 2019-04-04 | End: 2019-05-09 | Stop reason: SDUPTHER

## 2019-04-04 RX ORDER — DICYCLOMINE HCL 20 MG
20 TABLET ORAL EVERY 6 HOURS
Qty: 120 TABLET | Refills: 3 | Status: SHIPPED | OUTPATIENT
Start: 2019-04-04 | End: 2019-05-09 | Stop reason: SDUPTHER

## 2019-04-04 RX ORDER — PANTOPRAZOLE SODIUM 40 MG/1
40 TABLET, DELAYED RELEASE ORAL DAILY
Qty: 60 TABLET | Refills: 1 | Status: SHIPPED | OUTPATIENT
Start: 2019-04-04 | End: 2019-05-09 | Stop reason: SDUPTHER

## 2019-04-04 RX ORDER — LORATADINE 10 MG/1
10 TABLET ORAL DAILY
Qty: 30 TABLET | Refills: 1 | Status: SHIPPED | OUTPATIENT
Start: 2019-04-04 | End: 2019-05-09 | Stop reason: SDUPTHER

## 2019-04-04 RX ORDER — CYCLOBENZAPRINE HCL 10 MG
10 TABLET ORAL 3 TIMES DAILY PRN
Qty: 30 TABLET | Refills: 1 | Status: SHIPPED | OUTPATIENT
Start: 2019-04-04 | End: 2019-05-09 | Stop reason: SDUPTHER

## 2019-04-08 DIAGNOSIS — K13.70 LESION OF ORAL MUCOSA: Primary | ICD-10-CM

## 2019-04-10 ENCOUNTER — TELEPHONE (OUTPATIENT)
Dept: FAMILY MEDICINE CLINIC | Age: 34
End: 2019-04-10

## 2019-04-10 NOTE — TELEPHONE ENCOUNTER
Dr Gala Marion will not see adult pt's with [facility] .  LM for pt to call and see where else she would like to go

## 2019-04-22 ENCOUNTER — TELEPHONE (OUTPATIENT)
Dept: FAMILY MEDICINE CLINIC | Age: 34
End: 2019-04-22

## 2019-04-22 NOTE — TELEPHONE ENCOUNTER
Brenton Molina called was in the hospital for stomach pain and an episode where her whole body felt like it was on fire. She couldn't stay awake. She is nauseous and still having stomach pains. Feels like she is floating in and out of consciousness. Having Diarrhea and vomiting. She was dx: with gastroparesis(sp)? But was told to follow up with you for the other symptoms of weakness and floating in an out of consciousness. Any recommendations? She has an appt with you on Friday.

## 2019-05-05 DIAGNOSIS — I10 ESSENTIAL HYPERTENSION: ICD-10-CM

## 2019-05-06 RX ORDER — LISINOPRIL 20 MG/1
20 TABLET ORAL DAILY
Qty: 30 TABLET | Refills: 1 | Status: SHIPPED | OUTPATIENT
Start: 2019-05-06 | End: 2019-05-09 | Stop reason: SDUPTHER

## 2019-05-09 DIAGNOSIS — K21.9 GASTROESOPHAGEAL REFLUX DISEASE WITHOUT ESOPHAGITIS: ICD-10-CM

## 2019-05-09 RX ORDER — DICYCLOMINE HCL 20 MG
20 TABLET ORAL EVERY 6 HOURS
Qty: 120 TABLET | Refills: 3 | Status: SHIPPED | OUTPATIENT
Start: 2019-05-09 | End: 2019-06-10 | Stop reason: SDUPTHER

## 2019-05-09 RX ORDER — DOCUSATE SODIUM 100 MG/1
100 CAPSULE, LIQUID FILLED ORAL NIGHTLY
Qty: 20 CAPSULE | Refills: 0 | Status: SHIPPED | OUTPATIENT
Start: 2019-05-09 | End: 2019-08-08 | Stop reason: SDUPTHER

## 2019-06-10 DIAGNOSIS — M79.7 FIBROMYALGIA: ICD-10-CM

## 2019-06-10 DIAGNOSIS — I10 ESSENTIAL HYPERTENSION: ICD-10-CM

## 2019-06-10 DIAGNOSIS — K21.9 GASTROESOPHAGEAL REFLUX DISEASE WITHOUT ESOPHAGITIS: ICD-10-CM

## 2019-06-10 DIAGNOSIS — M54.40 ACUTE BILATERAL LOW BACK PAIN WITH SCIATICA, SCIATICA LATERALITY UNSPECIFIED: ICD-10-CM

## 2019-06-10 DIAGNOSIS — R11.0 NAUSEA: ICD-10-CM

## 2019-06-10 RX ORDER — PROMETHAZINE HYDROCHLORIDE 25 MG/1
25 TABLET ORAL EVERY 6 HOURS PRN
Qty: 30 TABLET | Refills: 0 | Status: SHIPPED | OUTPATIENT
Start: 2019-06-10 | End: 2019-08-08 | Stop reason: SDUPTHER

## 2019-06-10 RX ORDER — DICYCLOMINE HCL 20 MG
20 TABLET ORAL EVERY 6 HOURS
Qty: 120 TABLET | Refills: 3 | Status: SHIPPED | OUTPATIENT
Start: 2019-06-10 | End: 2019-08-08 | Stop reason: SDUPTHER

## 2019-06-10 RX ORDER — IBUPROFEN 800 MG/1
TABLET ORAL
Qty: 60 TABLET | Refills: 0 | Status: SHIPPED | OUTPATIENT
Start: 2019-06-10 | End: 2019-08-08 | Stop reason: SDUPTHER

## 2019-06-10 RX ORDER — LISINOPRIL 20 MG/1
20 TABLET ORAL DAILY
Qty: 30 TABLET | Refills: 0 | Status: SHIPPED | OUTPATIENT
Start: 2019-06-10 | End: 2019-08-08 | Stop reason: SDUPTHER

## 2019-06-10 RX ORDER — METOPROLOL TARTRATE 50 MG/1
TABLET, FILM COATED ORAL
Qty: 60 TABLET | Refills: 0 | Status: SHIPPED | OUTPATIENT
Start: 2019-06-10 | End: 2019-08-09 | Stop reason: SDUPTHER

## 2019-06-10 RX ORDER — LORATADINE 10 MG/1
10 TABLET ORAL DAILY
Qty: 30 TABLET | Refills: 0 | Status: SHIPPED | OUTPATIENT
Start: 2019-06-10 | End: 2019-08-08 | Stop reason: SDUPTHER

## 2019-06-10 RX ORDER — CYCLOBENZAPRINE HCL 10 MG
10 TABLET ORAL 3 TIMES DAILY PRN
Qty: 30 TABLET | Refills: 0 | Status: SHIPPED | OUTPATIENT
Start: 2019-06-10 | End: 2019-08-08 | Stop reason: SDUPTHER

## 2019-06-10 RX ORDER — PANTOPRAZOLE SODIUM 40 MG/1
40 TABLET, DELAYED RELEASE ORAL DAILY
Qty: 30 TABLET | Refills: 0 | Status: SHIPPED | OUTPATIENT
Start: 2019-06-10 | End: 2019-08-08 | Stop reason: SDUPTHER

## 2019-08-08 DIAGNOSIS — K21.9 GASTROESOPHAGEAL REFLUX DISEASE WITHOUT ESOPHAGITIS: ICD-10-CM

## 2019-08-08 DIAGNOSIS — M54.40 ACUTE BILATERAL LOW BACK PAIN WITH SCIATICA, SCIATICA LATERALITY UNSPECIFIED: ICD-10-CM

## 2019-08-08 DIAGNOSIS — I10 ESSENTIAL HYPERTENSION: ICD-10-CM

## 2019-08-08 DIAGNOSIS — R11.0 NAUSEA: ICD-10-CM

## 2019-08-08 DIAGNOSIS — M79.7 FIBROMYALGIA: ICD-10-CM

## 2019-08-08 RX ORDER — DICYCLOMINE HCL 20 MG
20 TABLET ORAL EVERY 6 HOURS
Qty: 120 TABLET | Refills: 3 | Status: SHIPPED | OUTPATIENT
Start: 2019-08-08 | End: 2019-10-01 | Stop reason: SDUPTHER

## 2019-08-08 RX ORDER — DOCUSATE SODIUM 100 MG/1
100 CAPSULE, LIQUID FILLED ORAL NIGHTLY
Qty: 20 CAPSULE | Refills: 0 | Status: SHIPPED | OUTPATIENT
Start: 2019-08-08 | End: 2019-08-25 | Stop reason: SDUPTHER

## 2019-08-09 RX ORDER — IBUPROFEN 800 MG/1
TABLET ORAL
Qty: 60 TABLET | Refills: 0 | Status: SHIPPED | OUTPATIENT
Start: 2019-08-09 | End: 2019-08-18 | Stop reason: SDUPTHER

## 2019-08-09 RX ORDER — METOPROLOL TARTRATE 50 MG/1
TABLET, FILM COATED ORAL
Qty: 60 TABLET | Refills: 0 | Status: SHIPPED | OUTPATIENT
Start: 2019-08-09 | End: 2019-08-09 | Stop reason: SDUPTHER

## 2019-08-09 RX ORDER — METOPROLOL TARTRATE 50 MG/1
TABLET, FILM COATED ORAL
Qty: 60 TABLET | Refills: 0 | Status: SHIPPED | OUTPATIENT
Start: 2019-08-09 | End: 2019-10-01 | Stop reason: SDUPTHER

## 2019-08-09 RX ORDER — PROMETHAZINE HYDROCHLORIDE 25 MG/1
25 TABLET ORAL EVERY 6 HOURS PRN
Qty: 30 TABLET | Refills: 0 | Status: SHIPPED | OUTPATIENT
Start: 2019-08-09 | End: 2019-10-01 | Stop reason: SDUPTHER

## 2019-08-09 RX ORDER — CYCLOBENZAPRINE HCL 10 MG
10 TABLET ORAL 3 TIMES DAILY PRN
Qty: 30 TABLET | Refills: 0 | Status: SHIPPED | OUTPATIENT
Start: 2019-08-09 | End: 2019-10-01 | Stop reason: SDUPTHER

## 2019-08-09 RX ORDER — LISINOPRIL 20 MG/1
20 TABLET ORAL DAILY
Qty: 30 TABLET | Refills: 0 | Status: SHIPPED | OUTPATIENT
Start: 2019-08-09 | End: 2019-10-01 | Stop reason: SDUPTHER

## 2019-08-09 RX ORDER — LORATADINE 10 MG/1
10 TABLET ORAL DAILY
Qty: 30 TABLET | Refills: 0 | Status: SHIPPED | OUTPATIENT
Start: 2019-08-09 | End: 2019-08-15 | Stop reason: ALTCHOICE

## 2019-08-09 RX ORDER — PANTOPRAZOLE SODIUM 40 MG/1
40 TABLET, DELAYED RELEASE ORAL DAILY
Qty: 30 TABLET | Refills: 0 | Status: SHIPPED | OUTPATIENT
Start: 2019-08-09 | End: 2019-10-01 | Stop reason: SDUPTHER

## 2019-08-15 ENCOUNTER — TELEPHONE (OUTPATIENT)
Dept: FAMILY MEDICINE CLINIC | Age: 34
End: 2019-08-15

## 2019-08-15 ENCOUNTER — OFFICE VISIT (OUTPATIENT)
Dept: FAMILY MEDICINE CLINIC | Age: 34
End: 2019-08-15
Payer: COMMERCIAL

## 2019-08-15 VITALS
WEIGHT: 293 LBS | HEART RATE: 78 BPM | DIASTOLIC BLOOD PRESSURE: 82 MMHG | SYSTOLIC BLOOD PRESSURE: 106 MMHG | BODY MASS INDEX: 45.99 KG/M2 | HEIGHT: 67 IN | OXYGEN SATURATION: 99 % | TEMPERATURE: 97.1 F

## 2019-08-15 DIAGNOSIS — R11.0 NAUSEA: ICD-10-CM

## 2019-08-15 DIAGNOSIS — K58.9 IRRITABLE BOWEL SYNDROME, UNSPECIFIED TYPE: ICD-10-CM

## 2019-08-15 DIAGNOSIS — D68.59 THROMBOPHILIA (HCC): ICD-10-CM

## 2019-08-15 DIAGNOSIS — D64.9 ANEMIA, UNSPECIFIED TYPE: ICD-10-CM

## 2019-08-15 DIAGNOSIS — E55.9 VITAMIN D DEFICIENCY: ICD-10-CM

## 2019-08-15 DIAGNOSIS — D68.9 COAGULOPATHY (HCC): ICD-10-CM

## 2019-08-15 DIAGNOSIS — Z13.29 THYROID DISORDER SCREENING: ICD-10-CM

## 2019-08-15 DIAGNOSIS — I10 ESSENTIAL HYPERTENSION: ICD-10-CM

## 2019-08-15 DIAGNOSIS — M79.89 SWELLING OF BOTH LOWER EXTREMITIES: ICD-10-CM

## 2019-08-15 DIAGNOSIS — Z13.220 LIPID SCREENING: ICD-10-CM

## 2019-08-15 DIAGNOSIS — D68.59 THROMBOPHILIA (HCC): Primary | ICD-10-CM

## 2019-08-15 DIAGNOSIS — J30.9 ALLERGIC RHINITIS, UNSPECIFIED SEASONALITY, UNSPECIFIED TRIGGER: ICD-10-CM

## 2019-08-15 DIAGNOSIS — G89.29 CHRONIC PAIN OF LEFT ANKLE: ICD-10-CM

## 2019-08-15 DIAGNOSIS — J45.909 UNCOMPLICATED ASTHMA, UNSPECIFIED ASTHMA SEVERITY, UNSPECIFIED WHETHER PERSISTENT: ICD-10-CM

## 2019-08-15 DIAGNOSIS — K21.9 GASTROESOPHAGEAL REFLUX DISEASE WITHOUT ESOPHAGITIS: Primary | ICD-10-CM

## 2019-08-15 DIAGNOSIS — M25.572 CHRONIC PAIN OF LEFT ANKLE: ICD-10-CM

## 2019-08-15 LAB
ALBUMIN SERPL-MCNC: 4.1 G/DL (ref 3.5–4.6)
ALP BLD-CCNC: 86 U/L (ref 40–130)
ALT SERPL-CCNC: 13 U/L (ref 0–33)
ANION GAP SERPL CALCULATED.3IONS-SCNC: 14 MEQ/L (ref 9–15)
AST SERPL-CCNC: 16 U/L (ref 0–35)
BASOPHILS ABSOLUTE: 0.1 K/UL (ref 0–0.2)
BASOPHILS RELATIVE PERCENT: 1.1 %
BILIRUB SERPL-MCNC: 0.4 MG/DL (ref 0.2–0.7)
BUN BLDV-MCNC: 8 MG/DL (ref 6–20)
CALCIUM SERPL-MCNC: 9.4 MG/DL (ref 8.5–9.9)
CHLORIDE BLD-SCNC: 99 MEQ/L (ref 95–107)
CHOLESTEROL, TOTAL: 198 MG/DL (ref 0–199)
CO2: 26 MEQ/L (ref 20–31)
CREAT SERPL-MCNC: 1 MG/DL (ref 0.5–0.9)
EOSINOPHILS ABSOLUTE: 0.3 K/UL (ref 0–0.7)
EOSINOPHILS RELATIVE PERCENT: 3.9 %
GFR AFRICAN AMERICAN: >60
GFR NON-AFRICAN AMERICAN: >60
GLOBULIN: 4 G/DL (ref 2.3–3.5)
GLUCOSE BLD-MCNC: 74 MG/DL (ref 70–99)
HCT VFR BLD CALC: 44.7 % (ref 37–47)
HDLC SERPL-MCNC: 37 MG/DL (ref 40–59)
HEMOGLOBIN: 14.9 G/DL (ref 12–16)
IRON SATURATION: 18 % (ref 11–46)
IRON: 48 UG/DL (ref 37–145)
LDL CHOLESTEROL CALCULATED: 129 MG/DL (ref 0–129)
LYMPHOCYTES ABSOLUTE: 2.7 K/UL (ref 1–4.8)
LYMPHOCYTES RELATIVE PERCENT: 30.7 %
MCH RBC QN AUTO: 28.8 PG (ref 27–31.3)
MCHC RBC AUTO-ENTMCNC: 33.3 % (ref 33–37)
MCV RBC AUTO: 86.5 FL (ref 82–100)
MONOCYTES ABSOLUTE: 0.6 K/UL (ref 0.2–0.8)
MONOCYTES RELATIVE PERCENT: 6.6 %
NEUTROPHILS ABSOLUTE: 5.1 K/UL (ref 1.4–6.5)
NEUTROPHILS RELATIVE PERCENT: 57.7 %
PDW BLD-RTO: 16.1 % (ref 11.5–14.5)
PLATELET # BLD: 353 K/UL (ref 130–400)
POTASSIUM SERPL-SCNC: 4.3 MEQ/L (ref 3.4–4.9)
RBC # BLD: 5.17 M/UL (ref 4.2–5.4)
SODIUM BLD-SCNC: 139 MEQ/L (ref 135–144)
TOTAL IRON BINDING CAPACITY: 265 UG/DL (ref 178–450)
TOTAL PROTEIN: 8.1 G/DL (ref 6.3–8)
TRIGL SERPL-MCNC: 160 MG/DL (ref 0–150)
TSH REFLEX: 0.84 UIU/ML (ref 0.44–3.86)
VITAMIN D 25-HYDROXY: 40.5 NG/ML (ref 30–100)
WBC # BLD: 8.8 K/UL (ref 4.8–10.8)

## 2019-08-15 PROCEDURE — 99214 OFFICE O/P EST MOD 30 MIN: CPT | Performed by: NURSE PRACTITIONER

## 2019-08-15 PROCEDURE — G8427 DOCREV CUR MEDS BY ELIG CLIN: HCPCS | Performed by: NURSE PRACTITIONER

## 2019-08-15 PROCEDURE — 4004F PT TOBACCO SCREEN RCVD TLK: CPT | Performed by: NURSE PRACTITIONER

## 2019-08-15 PROCEDURE — G8417 CALC BMI ABV UP PARAM F/U: HCPCS | Performed by: NURSE PRACTITIONER

## 2019-08-15 RX ORDER — LEVOCETIRIZINE DIHYDROCHLORIDE 5 MG/1
5 TABLET, FILM COATED ORAL NIGHTLY
Qty: 30 TABLET | Refills: 3 | Status: SHIPPED | OUTPATIENT
Start: 2019-08-15 | End: 2019-10-01 | Stop reason: SDUPTHER

## 2019-08-15 RX ORDER — OLANZAPINE 10 MG/1
TABLET ORAL
Refills: 5 | COMMUNITY
Start: 2019-07-10 | End: 2020-03-06 | Stop reason: ALTCHOICE

## 2019-08-15 RX ORDER — LITHIUM CARBONATE 450 MG
450 TABLET, EXTENDED RELEASE ORAL 2 TIMES DAILY
COMMUNITY
End: 2020-03-06 | Stop reason: ALTCHOICE

## 2019-08-15 ASSESSMENT — ENCOUNTER SYMPTOMS
SHORTNESS OF BREATH: 0
COUGH: 0

## 2019-08-18 DIAGNOSIS — M54.40 ACUTE BILATERAL LOW BACK PAIN WITH SCIATICA, SCIATICA LATERALITY UNSPECIFIED: ICD-10-CM

## 2019-08-19 RX ORDER — IBUPROFEN 800 MG/1
TABLET ORAL
Qty: 60 TABLET | Refills: 0 | Status: SHIPPED | OUTPATIENT
Start: 2019-08-19 | End: 2019-11-22 | Stop reason: SDUPTHER

## 2019-08-25 DIAGNOSIS — K21.9 GASTROESOPHAGEAL REFLUX DISEASE WITHOUT ESOPHAGITIS: ICD-10-CM

## 2019-08-28 RX ORDER — DOCUSATE SODIUM 100 MG/1
CAPSULE, LIQUID FILLED ORAL
Qty: 20 CAPSULE | Refills: 0 | Status: SHIPPED | OUTPATIENT
Start: 2019-08-28 | End: 2019-10-01 | Stop reason: SDUPTHER

## 2019-10-01 DIAGNOSIS — M79.7 FIBROMYALGIA: ICD-10-CM

## 2019-10-01 DIAGNOSIS — I10 ESSENTIAL HYPERTENSION: ICD-10-CM

## 2019-10-01 DIAGNOSIS — R11.0 NAUSEA: ICD-10-CM

## 2019-10-01 DIAGNOSIS — J30.9 ALLERGIC RHINITIS, UNSPECIFIED SEASONALITY, UNSPECIFIED TRIGGER: ICD-10-CM

## 2019-10-01 DIAGNOSIS — K21.9 GASTROESOPHAGEAL REFLUX DISEASE WITHOUT ESOPHAGITIS: ICD-10-CM

## 2019-10-01 RX ORDER — DOCUSATE SODIUM 100 MG/1
100 CAPSULE, LIQUID FILLED ORAL 2 TIMES DAILY PRN
Qty: 60 CAPSULE | Refills: 3 | Status: SHIPPED | OUTPATIENT
Start: 2019-10-01 | End: 2019-11-21 | Stop reason: SDUPTHER

## 2019-10-01 RX ORDER — LISINOPRIL 20 MG/1
20 TABLET ORAL DAILY
Qty: 30 TABLET | Refills: 3 | Status: SHIPPED | OUTPATIENT
Start: 2019-10-01 | End: 2019-11-21 | Stop reason: SDUPTHER

## 2019-10-01 RX ORDER — LEVOCETIRIZINE DIHYDROCHLORIDE 5 MG/1
5 TABLET, FILM COATED ORAL NIGHTLY
Qty: 30 TABLET | Refills: 3 | Status: SHIPPED | OUTPATIENT
Start: 2019-10-01 | End: 2019-11-21 | Stop reason: SDUPTHER

## 2019-10-01 RX ORDER — PANTOPRAZOLE SODIUM 40 MG/1
40 TABLET, DELAYED RELEASE ORAL DAILY
Qty: 30 TABLET | Refills: 3 | Status: SHIPPED | OUTPATIENT
Start: 2019-10-01 | End: 2019-11-21 | Stop reason: SDUPTHER

## 2019-10-01 RX ORDER — METOPROLOL TARTRATE 50 MG/1
TABLET, FILM COATED ORAL
Qty: 60 TABLET | Refills: 3 | Status: SHIPPED | OUTPATIENT
Start: 2019-10-01 | End: 2019-11-21 | Stop reason: SDUPTHER

## 2019-10-01 RX ORDER — DICYCLOMINE HCL 20 MG
20 TABLET ORAL EVERY 6 HOURS
Qty: 120 TABLET | Refills: 3 | Status: SHIPPED | OUTPATIENT
Start: 2019-10-01 | End: 2019-11-21 | Stop reason: SDUPTHER

## 2019-10-01 RX ORDER — CYCLOBENZAPRINE HCL 10 MG
10 TABLET ORAL 3 TIMES DAILY PRN
Qty: 30 TABLET | Refills: 0 | Status: SHIPPED | OUTPATIENT
Start: 2019-10-01 | End: 2019-11-21 | Stop reason: SDUPTHER

## 2019-10-01 RX ORDER — PROMETHAZINE HYDROCHLORIDE 25 MG/1
25 TABLET ORAL EVERY 6 HOURS PRN
Qty: 30 TABLET | Refills: 0 | Status: SHIPPED | OUTPATIENT
Start: 2019-10-01 | End: 2019-10-20 | Stop reason: SDUPTHER

## 2019-10-20 DIAGNOSIS — R11.0 NAUSEA: ICD-10-CM

## 2019-10-23 RX ORDER — PROMETHAZINE HYDROCHLORIDE 25 MG/1
TABLET ORAL
Qty: 30 TABLET | Refills: 0 | Status: SHIPPED | OUTPATIENT
Start: 2019-10-23 | End: 2019-11-21 | Stop reason: SDUPTHER

## 2019-11-14 ENCOUNTER — OFFICE VISIT (OUTPATIENT)
Dept: FAMILY MEDICINE CLINIC | Age: 34
End: 2019-11-14
Payer: COMMERCIAL

## 2019-11-14 VITALS
WEIGHT: 293 LBS | BODY MASS INDEX: 45.99 KG/M2 | HEART RATE: 81 BPM | OXYGEN SATURATION: 98 % | TEMPERATURE: 96.7 F | HEIGHT: 67 IN | SYSTOLIC BLOOD PRESSURE: 110 MMHG | DIASTOLIC BLOOD PRESSURE: 82 MMHG

## 2019-11-14 DIAGNOSIS — Z23 INFLUENZA VACCINE NEEDED: ICD-10-CM

## 2019-11-14 DIAGNOSIS — I10 ESSENTIAL HYPERTENSION: Primary | ICD-10-CM

## 2019-11-14 DIAGNOSIS — F41.1 GENERALIZED ANXIETY DISORDER: ICD-10-CM

## 2019-11-14 DIAGNOSIS — F32.A DEPRESSION, UNSPECIFIED DEPRESSION TYPE: ICD-10-CM

## 2019-11-14 DIAGNOSIS — R07.9 CHEST PAIN, UNSPECIFIED TYPE: ICD-10-CM

## 2019-11-14 PROCEDURE — G8482 FLU IMMUNIZE ORDER/ADMIN: HCPCS | Performed by: NURSE PRACTITIONER

## 2019-11-14 PROCEDURE — 90688 IIV4 VACCINE SPLT 0.5 ML IM: CPT | Performed by: NURSE PRACTITIONER

## 2019-11-14 PROCEDURE — G8427 DOCREV CUR MEDS BY ELIG CLIN: HCPCS | Performed by: NURSE PRACTITIONER

## 2019-11-14 PROCEDURE — 4004F PT TOBACCO SCREEN RCVD TLK: CPT | Performed by: NURSE PRACTITIONER

## 2019-11-14 PROCEDURE — 90471 IMMUNIZATION ADMIN: CPT | Performed by: NURSE PRACTITIONER

## 2019-11-14 PROCEDURE — G8417 CALC BMI ABV UP PARAM F/U: HCPCS | Performed by: NURSE PRACTITIONER

## 2019-11-14 PROCEDURE — 99213 OFFICE O/P EST LOW 20 MIN: CPT | Performed by: NURSE PRACTITIONER

## 2019-11-14 RX ORDER — PREDNISONE 50 MG/1
TABLET ORAL
Refills: 0 | COMMUNITY
Start: 2019-10-17 | End: 2020-03-06 | Stop reason: ALTCHOICE

## 2019-11-14 RX ORDER — DIPHENHYDRAMINE HYDROCHLORIDE 25 MG/1
CAPSULE ORAL
Refills: 0 | COMMUNITY
Start: 2019-10-17 | End: 2020-03-06 | Stop reason: ALTCHOICE

## 2019-11-14 ASSESSMENT — ENCOUNTER SYMPTOMS
COUGH: 0
SHORTNESS OF BREATH: 0

## 2019-11-21 DIAGNOSIS — K21.9 GASTROESOPHAGEAL REFLUX DISEASE WITHOUT ESOPHAGITIS: ICD-10-CM

## 2019-11-21 DIAGNOSIS — J30.9 ALLERGIC RHINITIS, UNSPECIFIED SEASONALITY, UNSPECIFIED TRIGGER: ICD-10-CM

## 2019-11-21 DIAGNOSIS — M54.40 ACUTE BILATERAL LOW BACK PAIN WITH SCIATICA, SCIATICA LATERALITY UNSPECIFIED: ICD-10-CM

## 2019-11-21 DIAGNOSIS — M79.7 FIBROMYALGIA: ICD-10-CM

## 2019-11-21 DIAGNOSIS — R11.0 NAUSEA: ICD-10-CM

## 2019-11-21 DIAGNOSIS — I10 ESSENTIAL HYPERTENSION: ICD-10-CM

## 2019-11-21 RX ORDER — IBUPROFEN 800 MG/1
TABLET ORAL
Qty: 60 TABLET | Refills: 0 | Status: CANCELLED | OUTPATIENT
Start: 2019-11-21

## 2019-11-22 RX ORDER — PANTOPRAZOLE SODIUM 40 MG/1
40 TABLET, DELAYED RELEASE ORAL DAILY
Qty: 30 TABLET | Refills: 3 | Status: SHIPPED | OUTPATIENT
Start: 2019-11-22 | End: 2019-12-27 | Stop reason: SDUPTHER

## 2019-11-22 RX ORDER — DICYCLOMINE HCL 20 MG
20 TABLET ORAL EVERY 6 HOURS
Qty: 120 TABLET | Refills: 3 | Status: SHIPPED | OUTPATIENT
Start: 2019-11-22 | End: 2019-12-27 | Stop reason: SDUPTHER

## 2019-11-22 RX ORDER — DOCUSATE SODIUM 100 MG/1
100 CAPSULE, LIQUID FILLED ORAL 2 TIMES DAILY PRN
Qty: 60 CAPSULE | Refills: 3 | Status: SHIPPED | OUTPATIENT
Start: 2019-11-22 | End: 2019-12-27 | Stop reason: SDUPTHER

## 2019-11-22 RX ORDER — IBUPROFEN 800 MG/1
800 TABLET ORAL EVERY 8 HOURS PRN
Qty: 60 TABLET | Refills: 0 | Status: SHIPPED | OUTPATIENT
Start: 2019-11-22 | End: 2020-03-06 | Stop reason: ALTCHOICE

## 2019-11-22 RX ORDER — LEVOCETIRIZINE DIHYDROCHLORIDE 5 MG/1
5 TABLET, FILM COATED ORAL NIGHTLY
Qty: 30 TABLET | Refills: 3 | Status: SHIPPED | OUTPATIENT
Start: 2019-11-22 | End: 2019-12-27 | Stop reason: SDUPTHER

## 2019-11-22 RX ORDER — METOPROLOL TARTRATE 50 MG/1
TABLET, FILM COATED ORAL
Qty: 60 TABLET | Refills: 3 | Status: SHIPPED | OUTPATIENT
Start: 2019-11-22 | End: 2019-12-27 | Stop reason: SDUPTHER

## 2019-11-22 RX ORDER — CYCLOBENZAPRINE HCL 10 MG
10 TABLET ORAL 3 TIMES DAILY PRN
Qty: 30 TABLET | Refills: 0 | Status: SHIPPED | OUTPATIENT
Start: 2019-11-22 | End: 2019-12-27 | Stop reason: SDUPTHER

## 2019-11-22 RX ORDER — PROMETHAZINE HYDROCHLORIDE 25 MG/1
25 TABLET ORAL EVERY 6 HOURS PRN
Qty: 30 TABLET | Refills: 0 | Status: SHIPPED | OUTPATIENT
Start: 2019-11-22 | End: 2019-12-27 | Stop reason: SDUPTHER

## 2019-11-22 RX ORDER — LISINOPRIL 20 MG/1
20 TABLET ORAL DAILY
Qty: 30 TABLET | Refills: 3 | Status: SHIPPED | OUTPATIENT
Start: 2019-11-22 | End: 2019-12-27 | Stop reason: SDUPTHER

## 2019-12-27 DIAGNOSIS — J30.9 ALLERGIC RHINITIS, UNSPECIFIED SEASONALITY, UNSPECIFIED TRIGGER: ICD-10-CM

## 2019-12-27 DIAGNOSIS — K21.9 GASTROESOPHAGEAL REFLUX DISEASE WITHOUT ESOPHAGITIS: ICD-10-CM

## 2019-12-27 DIAGNOSIS — R11.0 NAUSEA: ICD-10-CM

## 2019-12-27 DIAGNOSIS — M79.7 FIBROMYALGIA: ICD-10-CM

## 2019-12-27 DIAGNOSIS — I10 ESSENTIAL HYPERTENSION: ICD-10-CM

## 2019-12-30 RX ORDER — DOCUSATE SODIUM 100 MG/1
100 CAPSULE, LIQUID FILLED ORAL 2 TIMES DAILY PRN
Qty: 60 CAPSULE | Refills: 3 | Status: SHIPPED | OUTPATIENT
Start: 2019-12-30 | End: 2020-05-06 | Stop reason: SDUPTHER

## 2019-12-30 RX ORDER — DICYCLOMINE HCL 20 MG
20 TABLET ORAL EVERY 6 HOURS
Qty: 120 TABLET | Refills: 3 | Status: SHIPPED | OUTPATIENT
Start: 2019-12-30 | End: 2020-05-06 | Stop reason: SDUPTHER

## 2019-12-31 RX ORDER — PROMETHAZINE HYDROCHLORIDE 25 MG/1
25 TABLET ORAL EVERY 6 HOURS PRN
Qty: 30 TABLET | Refills: 0 | Status: SHIPPED | OUTPATIENT
Start: 2019-12-31 | End: 2020-02-19 | Stop reason: SDUPTHER

## 2019-12-31 RX ORDER — CYCLOBENZAPRINE HCL 10 MG
10 TABLET ORAL 3 TIMES DAILY PRN
Qty: 30 TABLET | Refills: 0 | Status: SHIPPED | OUTPATIENT
Start: 2019-12-31 | End: 2020-02-19 | Stop reason: SDUPTHER

## 2019-12-31 RX ORDER — LEVOCETIRIZINE DIHYDROCHLORIDE 5 MG/1
5 TABLET, FILM COATED ORAL NIGHTLY
Qty: 30 TABLET | Refills: 3 | Status: SHIPPED | OUTPATIENT
Start: 2019-12-31 | End: 2020-02-19 | Stop reason: SDUPTHER

## 2019-12-31 RX ORDER — METOPROLOL TARTRATE 50 MG/1
TABLET, FILM COATED ORAL
Qty: 60 TABLET | Refills: 3 | Status: SHIPPED | OUTPATIENT
Start: 2019-12-31 | End: 2020-02-19 | Stop reason: SDUPTHER

## 2019-12-31 RX ORDER — PANTOPRAZOLE SODIUM 40 MG/1
40 TABLET, DELAYED RELEASE ORAL DAILY
Qty: 30 TABLET | Refills: 3 | Status: SHIPPED | OUTPATIENT
Start: 2019-12-31 | End: 2020-02-19 | Stop reason: SDUPTHER

## 2019-12-31 RX ORDER — LISINOPRIL 20 MG/1
20 TABLET ORAL DAILY
Qty: 30 TABLET | Refills: 3 | Status: SHIPPED | OUTPATIENT
Start: 2019-12-31 | End: 2020-02-19 | Stop reason: SDUPTHER

## 2020-03-06 ENCOUNTER — OFFICE VISIT (OUTPATIENT)
Dept: FAMILY MEDICINE CLINIC | Age: 35
End: 2020-03-06
Payer: COMMERCIAL

## 2020-03-06 VITALS
DIASTOLIC BLOOD PRESSURE: 98 MMHG | BODY MASS INDEX: 45.99 KG/M2 | WEIGHT: 293 LBS | OXYGEN SATURATION: 98 % | HEIGHT: 67 IN | SYSTOLIC BLOOD PRESSURE: 124 MMHG | HEART RATE: 103 BPM

## 2020-03-06 DIAGNOSIS — R53.83 FATIGUE, UNSPECIFIED TYPE: ICD-10-CM

## 2020-03-06 DIAGNOSIS — D50.9 IRON DEFICIENCY ANEMIA, UNSPECIFIED IRON DEFICIENCY ANEMIA TYPE: ICD-10-CM

## 2020-03-06 LAB
ALBUMIN SERPL-MCNC: 3.6 G/DL (ref 3.5–4.6)
ALP BLD-CCNC: 83 U/L (ref 40–130)
ALT SERPL-CCNC: 17 U/L (ref 0–33)
ANION GAP SERPL CALCULATED.3IONS-SCNC: 14 MEQ/L (ref 9–15)
AST SERPL-CCNC: 22 U/L (ref 0–35)
BASOPHILS ABSOLUTE: 0.1 K/UL (ref 0–0.2)
BASOPHILS RELATIVE PERCENT: 1 %
BILIRUB SERPL-MCNC: <0.2 MG/DL (ref 0.2–0.7)
BUN BLDV-MCNC: 9 MG/DL (ref 6–20)
CALCIUM SERPL-MCNC: 9 MG/DL (ref 8.5–9.9)
CHLORIDE BLD-SCNC: 100 MEQ/L (ref 95–107)
CO2: 22 MEQ/L (ref 20–31)
CREAT SERPL-MCNC: 0.81 MG/DL (ref 0.5–0.9)
EOSINOPHILS ABSOLUTE: 0.3 K/UL (ref 0–0.7)
EOSINOPHILS RELATIVE PERCENT: 3.2 %
FOLATE: 3.6 NG/ML (ref 7.3–26.1)
GFR AFRICAN AMERICAN: >60
GFR NON-AFRICAN AMERICAN: >60
GLOBULIN: 4.1 G/DL (ref 2.3–3.5)
GLUCOSE BLD-MCNC: 95 MG/DL (ref 70–99)
HCT VFR BLD CALC: 42.7 % (ref 37–47)
HEMOGLOBIN: 14.1 G/DL (ref 12–16)
IRON SATURATION: 14 % (ref 11–46)
IRON: 34 UG/DL (ref 37–145)
LYMPHOCYTES ABSOLUTE: 3.1 K/UL (ref 1–4.8)
LYMPHOCYTES RELATIVE PERCENT: 29.7 %
MCH RBC QN AUTO: 28.1 PG (ref 27–31.3)
MCHC RBC AUTO-ENTMCNC: 33 % (ref 33–37)
MCV RBC AUTO: 85.1 FL (ref 82–100)
MONOCYTES ABSOLUTE: 0.8 K/UL (ref 0.2–0.8)
MONOCYTES RELATIVE PERCENT: 7.9 %
NEUTROPHILS ABSOLUTE: 6.1 K/UL (ref 1.4–6.5)
NEUTROPHILS RELATIVE PERCENT: 58.2 %
PDW BLD-RTO: 16.3 % (ref 11.5–14.5)
PLATELET # BLD: 364 K/UL (ref 130–400)
POTASSIUM SERPL-SCNC: 3.9 MEQ/L (ref 3.4–4.9)
RBC # BLD: 5.02 M/UL (ref 4.2–5.4)
SODIUM BLD-SCNC: 136 MEQ/L (ref 135–144)
TOTAL IRON BINDING CAPACITY: 240 UG/DL (ref 178–450)
TOTAL PROTEIN: 7.7 G/DL (ref 6.3–8)
VITAMIN B-12: 483 PG/ML (ref 232–1245)
VITAMIN D 25-HYDROXY: 38 NG/ML (ref 30–100)
WBC # BLD: 10.5 K/UL (ref 4.8–10.8)

## 2020-03-06 PROCEDURE — G8417 CALC BMI ABV UP PARAM F/U: HCPCS | Performed by: NURSE PRACTITIONER

## 2020-03-06 PROCEDURE — 4004F PT TOBACCO SCREEN RCVD TLK: CPT | Performed by: NURSE PRACTITIONER

## 2020-03-06 PROCEDURE — 99214 OFFICE O/P EST MOD 30 MIN: CPT | Performed by: NURSE PRACTITIONER

## 2020-03-06 PROCEDURE — G8427 DOCREV CUR MEDS BY ELIG CLIN: HCPCS | Performed by: NURSE PRACTITIONER

## 2020-03-06 PROCEDURE — G8482 FLU IMMUNIZE ORDER/ADMIN: HCPCS | Performed by: NURSE PRACTITIONER

## 2020-03-06 RX ORDER — MELOXICAM 15 MG/1
15 TABLET ORAL DAILY
Qty: 90 TABLET | Refills: 1 | Status: SHIPPED | OUTPATIENT
Start: 2020-03-06 | End: 2020-05-08

## 2020-03-06 ASSESSMENT — ENCOUNTER SYMPTOMS
ABDOMINAL PAIN: 0
DIARRHEA: 0
CHEST TIGHTNESS: 0
BACK PAIN: 0
COUGH: 0
SHORTNESS OF BREATH: 0
CONSTIPATION: 0
PHOTOPHOBIA: 0

## 2020-03-06 NOTE — PROGRESS NOTES
Subjective  Chief Complaint   Patient presents with    Check-Up     med check up    Shoulder Pain     RT shoulder pain, states that a few weeks ago she woke up with it hurting and can't hardly lift her arm.  Discuss Labs     wants to have labs done, states that all she wants to do is sleep all day she has no energy or motivation. Shoulder Pain    The pain is present in the right shoulder. This is a new problem. The current episode started 1 to 4 weeks ago (3 weeks ago). There has been no history of extremity trauma. The problem occurs constantly. The problem has been gradually worsening. The quality of the pain is described as aching. The pain is moderate. Associated symptoms include a limited range of motion. Pertinent negatives include no fever, inability to bear weight, itching, joint locking, joint swelling, numbness, stiffness or tingling. The symptoms are aggravated by activity. She has tried heat and NSAIDS (icy hot) for the symptoms. The treatment provided no relief. Family history does not include gout or rheumatoid arthritis. Hypertension   This is a chronic problem. The current episode started more than 1 year ago. The problem is unchanged. The problem is controlled. Associated symptoms include malaise/fatigue. Pertinent negatives include no chest pain, headaches, palpitations, peripheral edema or shortness of breath. Agents associated with hypertension include NSAIDs. Risk factors for coronary artery disease include obesity and sedentary lifestyle. Past treatments include beta blockers and ACE inhibitors. The current treatment provides significant improvement. Compliance problems include exercise and diet. There is no history of CAD/MI, heart failure or PVD. Pt here for multiple complaints as above. No energy, all she wants to do is sleep. Has gotten bad over the last several weeks. Wondering if her iron is low again.    Her typical day consists of cooking and cleaning inside the house.  works all the time so she doesn't really get to go anywhere. Does not exercise at all.  says she is very restless when she sleeps, but doesn't snore. Does not get restful sleep. Gets a lot of nightmares, doesn't sleep good. Only eats one meal per day and it is usually \"Kid friendly\" so tacos or macaroni and cheese.        Past Medical History:   Diagnosis Date    Anxiety     Asthma     CAD (coronary artery disease)     Tachycardia    Depression 10/29/2013    Dyslipidemia     Elevated blood pressure     Fibromyalgia     Fingernail abnormalities     right middle fingernail deformity    Generalized anxiety disorder     GERD (gastroesophageal reflux disease)     HPV (human papillomavirus)     Hypertension     IBS (irritable bowel syndrome) 2012    Migraine     Obesity     Pap smear for cervical cancer screening     - normal, pcos    PCOS (polycystic ovarian syndrome)     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     Patient Active Problem List    Diagnosis Date Noted    Fibromyalgia     Vertigo 10/29/2013    Depression 10/29/2013    Coagulopathy ([address] 10/29/2013    IBS (irritable bowel syndrome) 2012    Status post cholecystectomy 2012    Nicotine dependence 2012    Malabsorption syndrome 2012    Family history of cancer 2012    Asthma     Generalized anxiety disorder     GERD (gastroesophageal reflux disease)     Migraine     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HCC)     PCOS (polycystic ovarian syndrome)     Elevated blood pressure reading     Human papilloma virus infection     Dyslipidemia     Obesity      Past Surgical History:   Procedure Laterality Date    ARM SURGERY Left     ulna shortening    CARPAL TUNNEL RELEASE Bilateral      SECTION      CHOLECYSTECTOMY      LUMBAR [address] SURGERY  10/10/2018    DR Mook Arias    [place] EGD TRANSORAL BIOPSY SINGLE/MULTIPLE N/A 2/15/2018    EGD ESOPHAGOGASTRODUODENOSCOPY Pulse: 103    SpO2: 98%    Weight: (!) 339 lb (153.8 kg)    Height: 5' 7\" (1.702 m)      Physical Exam  Constitutional:       General: She is not in acute distress. Appearance: Normal appearance. She is obese. She is not ill-appearing, toxic-appearing or diaphoretic. HENT:      Head: Normocephalic and atraumatic. Right Ear: External ear normal.      Left Ear: External ear normal.   Neck:      Musculoskeletal: Normal range of motion and neck supple. No muscular tenderness. Cardiovascular:      Rate and Rhythm: Normal rate and regular rhythm. Pulses: Normal pulses. Heart sounds: Normal heart sounds. No murmur. Pulmonary:      Effort: Pulmonary effort is normal. No respiratory distress. Breath sounds: Normal breath sounds. No stridor. No wheezing, rhonchi or rales. Chest:      Chest wall: No tenderness. Musculoskeletal:      Right shoulder: She exhibits decreased range of motion, tenderness, bony tenderness, pain and decreased strength. She exhibits no swelling, no effusion, no crepitus, no deformity, no laceration, no spasm and normal pulse. Lymphadenopathy:      Cervical: No cervical adenopathy. Skin:     General: Skin is warm and dry. Capillary Refill: Capillary refill takes less than 2 seconds. Coloration: Skin is not jaundiced or pale. Findings: No bruising, erythema, lesion or rash. Neurological:      General: No focal deficit present. Mental Status: She is alert and oriented to person, place, and time. Mental status is at baseline. Cranial Nerves: No cranial nerve deficit. Coordination: Coordination normal.      Gait: Gait normal.   Psychiatric:         Mood and Affect: Mood normal.         Behavior: Behavior normal.         Thought Content: Thought content normal.         Judgment: Judgment normal.         Assessment& Plan     Diagnosis Orders   1.  Fatigue, unspecified type  CBC With Auto Differential    Comprehensive Metabolic Panel Vitamin D 25 Hydroxy    Vitamin B12 & Folate    [facility] Sleep Study with PAP Titration   2. Iron deficiency anemia, unspecified iron deficiency anemia type  Iron And Tibc   3. Restless sleeper  [facility] Sleep Study with PAP Titration   4. Essential hypertension  [facility] Sleep Study with PAP Titration   5. Migraine without status migrainosus, not intractable, unspecified migraine type  [facility] Sleep Study with PAP Titration   6. Acute pain of right shoulder  meloxicam (MOBIC) 15 MG tablet    [address], [place]   7. Class 3 severe obesity with serious comorbidity and body mass index (BMI) of 50.0 to 59.9 in adult, unspecified obesity type ([address]       Work up as ordered above. Increase exercise, start with goal of 5 minutes daily with plan to increase in 5 minute increments as tolerated. Food journal. Discussed need to eat foods that provide energy to body as opposed to simple carbs. Meloxicam daily as ordered. No additional NSAIDS. Referral to ortho. F/u in 4 weeks to recheck. Side effects, adverse effects of the medication prescribed today, as well as treatment plan/ rationale and result expectations have been discussed with the patient who expresses understanding and desires to proceed. Close follow up to evaluate treatment results and for coordination of care. I have reviewed the patient's medical history in detail and updated the computerized patient record. As always, patient is advised that if symptoms worsen in any way they will proceed to the nearest emergency room.        Orders Placed This Encounter   Procedures    CBC With Auto Differential     Standing Status:   Future     Number of Occurrences:   1     Standing Expiration Date:   3/6/2021    Comprehensive Metabolic Panel     Standing Status:   Future     Number of Occurrences:   1     Standing Expiration Date:   3/6/2021    Vitamin D 25 Hydroxy     Standing Status:   Future Number of Occurrences:   1     Standing Expiration Date:   3/6/2021    Vitamin B12 & Folate     Standing Status:   Future     Number of Occurrences:   1     Standing Expiration Date:   3/6/2021    Iron And Tibc     Standing Status:   Future     Number of Occurrences:   1     Standing Expiration Date:   3/6/2021     Order Specific Question:   Is Patient Fasting? Answer:   n     Order Specific Question:   No of Hours? Answer:   [facility], [place]     Referral Priority:   Routine     Referral Type:   Eval and Treat     Referral Reason:   Specialty Services Required     Requested Specialty:   Orthopedic Surgery     Number of Visits Requested:   [address] [facility] Sleep Study with PAP Titration     Patients with abnormal results will be assessed and referred to the sleep  as needed. Standing Status:   Future     Standing Expiration Date:   3/6/2021     Scheduling Instructions:      Sleep Study with PAP Titration - [place]       Scheduling and Pre-Certification: [phone]      The following must be completed and FAXED to [phone]      1) Sleep Evaluation Orders Form      2) Office note justifying study      3) Demographic info / insurance card     Order Specific Question:   Sleep Study Titration Type     Answer:   CPAP     Order Specific Question:   Location For Sleep Study     Answer:   [place]     Order Specific Question:   Select Sleep Lab Location     Answer:   [facility]     Comments:   [place]       Orders Placed This Encounter   Medications    meloxicam (MOBIC) 15 MG tablet     Sig: Take 1 tablet by mouth daily     Dispense:  90 tablet     Refill:  1       Return in about 4 weeks (around 4/3/2020) for recheck fatigue, bp.     Viridiana Matthews, APRN - CNP

## 2020-03-16 ENCOUNTER — TELEPHONE (OUTPATIENT)
Dept: FAMILY MEDICINE CLINIC | Age: 35
End: 2020-03-16

## 2020-03-17 NOTE — TELEPHONE ENCOUNTER
I called and got central scheduling for sleep studies. Whoever I spoke with stated that if pt has not had a baseline then that is what needs to be ordered.

## 2020-03-17 NOTE — TELEPHONE ENCOUNTER
I called them back and they told me how to order correctly. Corinna Ledezma will order and I will fax new order.

## 2020-03-25 ENCOUNTER — OFFICE VISIT (OUTPATIENT)
Dept: ORTHOPEDIC SURGERY | Age: 35
End: 2020-03-25
Payer: COMMERCIAL

## 2020-03-25 ENCOUNTER — HOSPITAL ENCOUNTER (OUTPATIENT)
Dept: ORTHOPEDIC SURGERY | Age: 35
Discharge: HOME | End: 2020-03-27
Payer: COMMERCIAL

## 2020-03-25 VITALS
BODY MASS INDEX: 45.99 KG/M2 | HEIGHT: 67 IN | WEIGHT: 293 LBS | TEMPERATURE: 96.7 F | OXYGEN SATURATION: 98 % | HEART RATE: 90 BPM

## 2020-03-25 PROCEDURE — 99203 OFFICE O/P NEW LOW 30 MIN: CPT | Performed by: ORTHOPAEDIC SURGERY

## 2020-03-25 PROCEDURE — G8482 FLU IMMUNIZE ORDER/ADMIN: HCPCS | Performed by: ORTHOPAEDIC SURGERY

## 2020-03-25 PROCEDURE — G8427 DOCREV CUR MEDS BY ELIG CLIN: HCPCS | Performed by: ORTHOPAEDIC SURGERY

## 2020-03-25 PROCEDURE — G8417 CALC BMI ABV UP PARAM F/U: HCPCS | Performed by: ORTHOPAEDIC SURGERY

## 2020-03-25 PROCEDURE — 4004F PT TOBACCO SCREEN RCVD TLK: CPT | Performed by: ORTHOPAEDIC SURGERY

## 2020-03-25 PROCEDURE — 73030 X-RAY EXAM OF SHOULDER: CPT | Performed by: ORTHOPAEDIC SURGERY

## 2020-03-25 PROCEDURE — 73030 X-RAY EXAM OF SHOULDER: CPT

## 2020-03-25 NOTE — PROGRESS NOTES
Subjective:      Patient ID: Demetrio Padilla is a 29 y.o. female who presents today for:  Chief Complaint   Patient presents with    Shoulder Pain     right shoulder pain [id number], pt states she has had previous injections and PT [id number] in [place], [place], Pt state she has not had any past MRI or xrays. pt denies any injurys. HPI  Patient comes in for evaluation of right shoulder pain. Has been going on for some time now. Has been treated in the past at [facility]. Had physical therapy. Had injections. Is currently on meloxicam.  No resolution discomfort. No radiation of the pain. Pain is over the right anterior aspect and lateral aspect of the right shoulder. Increases with weather changes. No trauma.     Past Medical History:   Diagnosis Date    Anxiety     Asthma     CAD (coronary artery disease)     Tachycardia    Depression 10/29/2013    Dyslipidemia     Elevated blood pressure     Fibromyalgia     Fingernail abnormalities     right middle fingernail deformity    Generalized anxiety disorder     GERD (gastroesophageal reflux disease)     HPV (human papillomavirus)     Hypertension     IBS (irritable bowel syndrome) 2012    Migraine     Obesity     Pap smear for cervical cancer screening     - normal, pcos    PCOS (polycystic ovarian syndrome)     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[address]     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      Past Surgical History:   Procedure Laterality Date    ARM SURGERY Left     ulna shortening    CARPAL TUNNEL RELEASE Bilateral      SECTION      CHOLECYSTECTOMY      LUMBAR [address] SURGERY  10/10/2018    DR Tien Dong [place] EGD TRANSORAL BIOPSY SINGLE/MULTIPLE N/A 2/15/2018    EGD ESOPHAGOGASTRODUODENOSCOPY performed by Gerri Arias MD at [address] ARTHROSCOPY Left     SHOULDER ARTHROSCOPY Left 2017    Jerald Sahni MD     WRIST ARTHROSCOPY Left      Social History     Socioeconomic History    Marital status:      Spouse cyclobenzaprine (FLEXERIL) 10 MG tablet Take 1 tablet by mouth 3 times daily as needed for Muscle spasms 30 tablet 3    metoprolol tartrate (LOPRESSOR) 50 MG tablet TAKE 1 TABLETS BY MOUTH TWICE DAILY 60 tablet 3    pantoprazole (PROTONIX) 40 MG tablet Take 1 tablet by mouth daily 30 tablet 3    lisinopril (PRINIVIL;ZESTRIL) 20 MG tablet Take 1 tablet by mouth daily 30 tablet 3    levocetirizine (XYZAL) 5 MG tablet Take 1 tablet by mouth nightly 30 tablet 3    promethazine (PHENERGAN) 25 MG tablet Take 1 tablet by mouth every 6 hours as needed for Nausea 30 tablet 3    dicyclomine (BENTYL) 20 MG tablet Take 1 tablet by mouth every 6 hours 120 tablet 3    docusate sodium (DOK) 100 MG capsule Take 1 capsule by mouth 2 times daily as needed for Constipation 60 capsule 3    venlafaxine (EFFEXOR XR) 75 MG extended release capsule Take 75 mg by mouth daily      FERROUS SULFATE PO Take 325 mg by mouth       No current facility-administered medications on file prior to visit. Review of Systems  Denies any fever chills. Denies any numbness and tingling in the right upper extremity. Denies any history of any trauma. No allergies to anti-inflammatories. Objective:   Pulse 90   Temp 96.7 °F (35.9 °C) (Temporal)   Ht 5' 7\" (1.702 m)   Wt (!) 339 lb (153.8 kg)   LMP 02/14/2020 (Approximate)   SpO2 98%   BMI 53.09 kg/m²     Ortho Exam  Pleasant oriented overweight white female. Right shoulder shows no effusion no erythema no ecchymosis. Range of motion is decreased with pain at extreme. Abduction 0 to 90 degrees from 90 degrees on 140 there is pain. Flexion 0-140 with pain. Minimal positive impingement t test.  Negative speeds test.  No winging of the scapula. Full range of motion of the elbow wrist and hand. Less than 2 radial pulses noted. Normal sensation light touch. No difficulty with range of motion cervical spine. There is global tenderness in the posterior aspect of the shoulder. Lateral aspect of the shoulder. Anterior aspect of the shoulder. And superior aspect of the shoulder. Radiographs and Laboratory Studies:     Diagnostic Imaging Studies:      Radiological evaluation consisting AP and Y-view of the right shoulder that we obtained to evaluate the source of pain shows    Laboratory Studies:   Lab Results   Component Value Date    WBC 10.5 03/06/2020    HGB 14.1 03/06/2020    HCT 42.7 03/06/2020    MCV 85.1 03/06/2020     03/06/2020     No results found for: SEDRATE  No results found for: CRP    Assessment:      Diagnosis Orders   1. Pain  XR SHOULDER RIGHT (MIN 2 VIEWS)   2. Right anterior shoulder pain  MRI SHOULDER RIGHT WO CONTRAST          Plan:     Impression is that of rotator cuff disease of the right shoulder with right shoulder pain recommendation at this point will be to obtain an MR evaluation of the right shoulder. Patient has failed conservative treatment consisting of injections, physical therapy, and anti-inflammatories. We will go ahead and obtain an MRI of the right shoulder. We will see her back after the MRI. Orders Placed This Encounter   Procedures    XR SHOULDER RIGHT (MIN 2 VIEWS)     Standing Status:   Future     Number of Occurrences:   1     Standing Expiration Date:   3/25/2021     Scheduling Instructions:      AP, outlet, axillary     Order Specific Question:   Reason for exam:     Answer:   shoulder pain    MRI SHOULDER RIGHT WO CONTRAST     Standing Status:   Future     Standing Expiration Date:   3/25/2021     No orders of the defined types were placed in this encounter. No follow-ups on file.       Kadeem Ann MD

## 2020-05-07 RX ORDER — PANTOPRAZOLE SODIUM 40 MG/1
40 TABLET, DELAYED RELEASE ORAL DAILY
Qty: 30 TABLET | Refills: 3 | Status: SHIPPED | OUTPATIENT
Start: 2020-05-07 | End: 2020-06-27 | Stop reason: SDUPTHER

## 2020-05-07 RX ORDER — LEVOCETIRIZINE DIHYDROCHLORIDE 5 MG/1
5 TABLET, FILM COATED ORAL NIGHTLY
Qty: 30 TABLET | Refills: 3 | Status: SHIPPED | OUTPATIENT
Start: 2020-05-07 | End: 2020-06-27 | Stop reason: SDUPTHER

## 2020-05-07 RX ORDER — LISINOPRIL 20 MG/1
20 TABLET ORAL DAILY
Qty: 30 TABLET | Refills: 3 | Status: SHIPPED | OUTPATIENT
Start: 2020-05-07 | End: 2020-05-21 | Stop reason: SDUPTHER

## 2020-05-07 RX ORDER — METOPROLOL TARTRATE 50 MG/1
TABLET, FILM COATED ORAL
Qty: 60 TABLET | Refills: 3 | Status: SHIPPED | OUTPATIENT
Start: 2020-05-07 | End: 2020-06-27 | Stop reason: SDUPTHER

## 2020-05-07 RX ORDER — PROMETHAZINE HYDROCHLORIDE 25 MG/1
25 TABLET ORAL EVERY 6 HOURS PRN
Qty: 30 TABLET | Refills: 3 | Status: SHIPPED | OUTPATIENT
Start: 2020-05-07 | End: 2020-06-27 | Stop reason: SDUPTHER

## 2020-05-07 RX ORDER — METHYLPREDNISOLONE 4 MG/1
TABLET ORAL
Qty: 21 TABLET | Refills: 0 | Status: SHIPPED | OUTPATIENT
Start: 2020-05-07 | End: 2020-05-13

## 2020-05-07 RX ORDER — DICYCLOMINE HCL 20 MG
20 TABLET ORAL EVERY 6 HOURS
Qty: 120 TABLET | Refills: 3 | Status: SHIPPED | OUTPATIENT
Start: 2020-05-07 | End: 2022-04-27 | Stop reason: SDUPTHER

## 2020-05-07 RX ORDER — DOCUSATE SODIUM 100 MG/1
100 CAPSULE, LIQUID FILLED ORAL 2 TIMES DAILY PRN
Qty: 60 CAPSULE | Refills: 3 | Status: SHIPPED | OUTPATIENT
Start: 2020-05-07 | End: 2022-04-27 | Stop reason: SDUPTHER

## 2020-05-07 RX ORDER — CYCLOBENZAPRINE HCL 10 MG
10 TABLET ORAL 3 TIMES DAILY PRN
Qty: 30 TABLET | Refills: 3 | Status: SHIPPED | OUTPATIENT
Start: 2020-05-07 | End: 2020-06-27 | Stop reason: SDUPTHER

## 2020-05-08 ENCOUNTER — VIRTUAL VISIT (OUTPATIENT)
Dept: FAMILY MEDICINE CLINIC | Age: 35
End: 2020-05-08
Payer: COMMERCIAL

## 2020-05-08 PROCEDURE — 99213 OFFICE O/P EST LOW 20 MIN: CPT | Performed by: NURSE PRACTITIONER

## 2020-05-08 RX ORDER — VENLAFAXINE HYDROCHLORIDE 150 MG/1
CAPSULE, EXTENDED RELEASE ORAL
COMMUNITY
Start: 2020-05-01

## 2020-05-08 SDOH — ECONOMIC STABILITY: TRANSPORTATION INSECURITY
IN THE PAST 12 MONTHS, HAS THE LACK OF TRANSPORTATION KEPT YOU FROM MEDICAL APPOINTMENTS OR FROM GETTING MEDICATIONS?: NO

## 2020-05-08 SDOH — ECONOMIC STABILITY: FOOD INSECURITY: WITHIN THE PAST 12 MONTHS, YOU WORRIED THAT YOUR FOOD WOULD RUN OUT BEFORE YOU GOT MONEY TO BUY MORE.: OFTEN TRUE

## 2020-05-08 SDOH — ECONOMIC STABILITY: INCOME INSECURITY: HOW HARD IS IT FOR YOU TO PAY FOR THE VERY BASICS LIKE FOOD, HOUSING, MEDICAL CARE, AND HEATING?: NOT HARD AT ALL

## 2020-05-08 SDOH — ECONOMIC STABILITY: TRANSPORTATION INSECURITY
IN THE PAST 12 MONTHS, HAS LACK OF TRANSPORTATION KEPT YOU FROM MEETINGS, WORK, OR FROM GETTING THINGS NEEDED FOR DAILY LIVING?: NO

## 2020-05-08 SDOH — ECONOMIC STABILITY: FOOD INSECURITY: WITHIN THE PAST 12 MONTHS, THE FOOD YOU BOUGHT JUST DIDN'T LAST AND YOU DIDN'T HAVE MONEY TO GET MORE.: SOMETIMES TRUE

## 2020-05-08 ASSESSMENT — ENCOUNTER SYMPTOMS
PHOTOPHOBIA: 0
CHEST TIGHTNESS: 0
BACK PAIN: 1
COUGH: 0
SHORTNESS OF BREATH: 0
COLOR CHANGE: 0

## 2020-05-08 NOTE — PROGRESS NOTES
This visit began at 303    Location of the visit was the [facility] primary care site. TELEHEALTH APPOINTMENT  Patient has been screened to determine that this visit qualifies for a \"Telephone Visit\". Patient is currently established with the current medical practice, the condition being reviewed was not addressed within the previous 7 days and is not likely be determined to need a procedure within the next 24 hours. This visit was via telephone due to the restrictions of the COVID-19 pandemic. All issues as below were discussed and addressed but no physical exam was performed. It was felt the patient should be evaluated in the clinic there will be comment below demonstrating they were directed there. The patient is aware and has given verbal consent to be billed for this telephone encounter. Chief Complaint   Patient presents with   Jh Saunders     Pt states that she gets brusises on her legs for no reason - would like to know if this may have something to do with medicatiopn    Back Pain     x 3 days, hurst to get in and out of the car, trying to shower, or even getting dresed. Turning twisting all cause pain, sitting still helps more of dull pain,        HPI    Discuss bruising and back pain. Has been having random bruising for the last month, all over on her legs, no falls or bumps that would cause the bruising. Show up out of nowhere. Sore to touch. No longer sees hematology because she says they \"released me as a patient because my blood condition was no longer an issue\". Denies hematuria, blood in the stool, bloody noses or bleeding gums. Does notice that if she gets a cut it will take longer to stop bleeding than normal.     Back pain, started a couple days ago. Took tylenol/ibuprofen and flexeril without relief. Hard time moving, hard to get in and out of car, hard to take a shower. Pain is located in her lower back on the right side and radiates around into her hip.  Has h/o syndrome)     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     Past Surgical History:   Procedure Laterality Date    ARM SURGERY Left     ulna shortening    CARPAL TUNNEL RELEASE Bilateral      SECTION      CHOLECYSTECTOMY      LUMBAR [address] SURGERY  10/10/2018    DR Yanez Res [place] EGD TRANSORAL BIOPSY SINGLE/MULTIPLE N/A 2/15/2018    EGD ESOPHAGOGASTRODUODENOSCOPY performed by Winnie Owusu MD at [address] ARTHROSCOPY Left     SHOULDER ARTHROSCOPY Left 2017    Ade Mayes MD     WRIST ARTHROSCOPY Left      Social History     Socioeconomic History    Marital status:      Spouse name: Not on file    Number of children: Not on file    Years of education: Not on file    Highest education level: Not on file   Occupational History    Not on file   Social Needs    Financial resource strain: Not hard at all    Food insecurity     Worry: Often true     Inability: Sometimes true    Transportation needs     Medical: No     Non-medical: No   Tobacco Use    Smoking status: Current Every Day Smoker     Packs/day: 0.30     Years: 17.00     Pack years: 5.10     Types: Cigarettes    Smokeless tobacco: Never Used   Substance and Sexual Activity    Alcohol use: No    Drug use: No    Sexual activity: Never   Lifestyle    Physical activity     Days per week: Not on file     Minutes per session: Not on file    Stress: Not on file   Relationships    Social connections     Talks on phone: Not on file     Gets together: Not on file     Attends Hinduism service: Not on file     Active member of club or organization: Not on file     Attends meetings of clubs or organizations: Not on file     Relationship status: Not on file    Intimate partner violence     Fear of current or ex partner: Not on file     Emotionally abused: Not on file     Physically abused: Not on file     Forced sexual activity: Not on file   Other Topics Concern    Not on file   Social Comprehensive Metabolic Panel    Collection Time: 03/06/20 11:22 AM   Result Value Ref Range    Sodium 136 135 - 144 mEq/L    Potassium 3.9 3.4 - 4.9 mEq/L    Chloride 100 95 - 107 mEq/L    CO2 22 20 - 31 mEq/L    Anion Gap 14 9 - 15 mEq/L    Glucose 95 70 - 99 mg/dL    BUN 9 6 - 20 mg/dL    CREATININE 0.81 0.50 - 0.90 mg/dL    GFR Non-African American >60.0 >60    GFR  >60.0 >60    Calcium 9.0 8.5 - 9.9 mg/dL    Total Protein 7.7 6.3 - 8.0 g/dL    Alb 3.6 3.5 - 4.6 g/dL    Total Bilirubin <0.2 0.2 - 0.7 mg/dL    Alkaline Phosphatase 83 40 - 130 U/L    ALT 17 0 - 33 U/L    AST 22 0 - 35 U/L    Globulin 4.1 (H) 2.3 - 3.5 g/dL   CBC With Auto Differential    Collection Time: 03/06/20 11:22 AM   Result Value Ref Range    WBC 10.5 4.8 - 10.8 K/uL    RBC 5.02 4.20 - 5.40 M/uL    Hemoglobin 14.1 12.0 - 16.0 g/dL    Hematocrit 42.7 37.0 - 47.0 %    MCV 85.1 82.0 - 100.0 fL    MCH 28.1 27.0 - 31.3 pg    MCHC 33.0 33.0 - 37.0 %    RDW 16.3 (H) 11.5 - 14.5 %    Platelets 244 264 - 134 K/uL    Neutrophils % 58.2 %    Lymphocytes % 29.7 %    Monocytes % 7.9 %    Eosinophils % 3.2 %    Basophils % 1.0 %    Neutrophils Absolute 6.1 1.4 - 6.5 K/uL    Lymphocytes Absolute 3.1 1.0 - 4.8 K/uL    Monocytes Absolute 0.8 0.2 - 0.8 K/uL    Eosinophils Absolute 0.3 0.0 - 0.7 K/uL    Basophils Absolute 0.1 0.0 - 0.2 K/uL           Assessment:       Diagnosis Orders   1. Acute right-sided low back pain with right-sided sciatica     2. Easy bruising           No orders of the defined types were placed in this encounter. Plan:   No follow-ups on file. Check labs as ordered. If all normal, will f/u with psychiatry to discuss effexor as possible cause of easy bruising. Medrol dosepack as ordered. Muscle relaxer PRN sparingly. Ice to area PRN. Stretching exercises. Avoid bedrest.  No heavy lifting. Referral to PT as ordered. F/u if no improvement. Referral for neurology.       There are no Patient

## 2020-05-21 RX ORDER — LISINOPRIL 20 MG/1
20 TABLET ORAL DAILY
Qty: 30 TABLET | Refills: 3 | Status: SHIPPED | OUTPATIENT
Start: 2020-05-21 | End: 2020-06-27 | Stop reason: SDUPTHER

## 2020-06-29 RX ORDER — DOCUSATE SODIUM 100 MG/1
100 CAPSULE, LIQUID FILLED ORAL 2 TIMES DAILY PRN
Qty: 60 CAPSULE | Refills: 3 | OUTPATIENT
Start: 2020-06-29

## 2020-06-29 RX ORDER — DICYCLOMINE HCL 20 MG
20 TABLET ORAL EVERY 6 HOURS
Qty: 120 TABLET | Refills: 3 | OUTPATIENT
Start: 2020-06-29

## 2020-07-16 ENCOUNTER — TELEPHONE (OUTPATIENT)
Dept: FAMILY MEDICINE CLINIC | Age: 35
End: 2020-07-16

## 2020-07-16 NOTE — TELEPHONE ENCOUNTER
FYI! [address] calling to report that pt was scheduled to see [facility] due to a possible stroke on 8/3. Pt has a strong no show rate there, and will more than likely be discharged, if she no shows again. The appt is with Dr. Wayne Salazar.

## 2020-09-11 ENCOUNTER — VIRTUAL VISIT (OUTPATIENT)
Dept: FAMILY MEDICINE CLINIC | Age: 35
End: 2020-09-11
Payer: COMMERCIAL

## 2020-09-11 PROCEDURE — G8427 DOCREV CUR MEDS BY ELIG CLIN: HCPCS | Performed by: NURSE PRACTITIONER

## 2020-09-11 PROCEDURE — 99214 OFFICE O/P EST MOD 30 MIN: CPT | Performed by: NURSE PRACTITIONER

## 2020-09-11 RX ORDER — METHYLPREDNISOLONE 4 MG/1
TABLET ORAL
Qty: 21 TABLET | Refills: 0 | Status: SHIPPED | OUTPATIENT
Start: 2020-09-11 | End: 2020-09-17

## 2020-09-11 RX ORDER — CARBAMAZEPINE 200 MG/1
TABLET ORAL
COMMUNITY
Start: 2020-06-30 | End: 2020-12-18 | Stop reason: ALTCHOICE

## 2020-09-11 RX ORDER — BREXPIPRAZOLE 1 MG/1
TABLET ORAL
COMMUNITY
Start: 2020-07-29 | End: 2020-12-18 | Stop reason: ALTCHOICE

## 2020-09-11 RX ORDER — NAPROXEN 500 MG/1
TABLET ORAL
COMMUNITY
Start: 2020-06-26 | End: 2020-10-13

## 2020-09-11 RX ORDER — VENLAFAXINE HYDROCHLORIDE 75 MG/1
CAPSULE, EXTENDED RELEASE ORAL
COMMUNITY
Start: 2020-06-11

## 2020-09-11 ASSESSMENT — ENCOUNTER SYMPTOMS
FACIAL SWELLING: 0
SHORTNESS OF BREATH: 0
COUGH: 0
PHOTOPHOBIA: 0
BACK PAIN: 0
CHEST TIGHTNESS: 0

## 2020-09-11 NOTE — PROGRESS NOTES
2020    TELEHEALTH EVALUATION -- Audio/Visual (During BJBLH-60 public health emergency)    Due to COVID 19 outbreak, patient's office visit was converted to a virtual visit. Patient was contacted and agreed to proceed with a virtual visit via [employer]. me  The risks and benefits of converting to a virtual visit were discussed in light of the current infectious disease epidemic. Patient also understood that insurance coverage and co-pays are up to their individual insurance plans. HPI:    Morenita Maday (:  1985) has requested an audio/video evaluation for the following concern(s):    Chief Complaint   Patient presents with    Ear Problem     states that she has pressure in her ears all the time, states that they feel like they are going to explode. Ear Problem   This is a new problem. The current episode started more than 1 month ago (the last couple of months). The problem has been unchanged. Associated symptoms include headaches. Pertinent negatives include no arthralgias, chest pain, chills, congestion, coughing, diaphoresis, fatigue or fever. Associated symptoms comments: Decreased hearing  Pressure, feels like \"My ears are going to explode\"  Feels almost like it is swelling inside. Nothing aggravates the symptoms. Treatments tried: \"ear candle\", peroxide. The treatment provided no relief. Also mentions that her pulse rate has been \"erratic\". Will go anywhere from 90s-110s. Does not feel like it is irregular.  has also checked pulse manually and has not noticed it skipping. Mother was recently diagnosed with a fib so she was concerned because of that. Review of Systems   Constitutional: Negative for chills, diaphoresis, fatigue and fever. HENT: Positive for ear pain and hearing loss. Negative for congestion, ear discharge and facial swelling. Eyes: Negative for photophobia and visual disturbance.    Respiratory: Negative for cough, chest tightness and shortness of breath. Cardiovascular: Negative for chest pain, palpitations and leg swelling. Endocrine: Negative for polydipsia, polyphagia and polyuria. Musculoskeletal: Negative for arthralgias and back pain. Neurological: Positive for headaches. Negative for dizziness. Psychiatric/Behavioral: Negative for dysphoric mood. The patient is not nervous/anxious. Prior to Visit Medications    Medication Sig Taking? Authorizing Provider   venlafaxine (EFFEXOR XR) 75 MG extended release capsule TAKE 1 CAPSULE EVERY EVENING Yes Historical Provider, MD   carBAMazepine (TEGRETOL) 200 MG tablet TAKE 1 TABLET BY MOUTH TWICE DAILY Yes Historical Provider, MD   naproxen (NAPROSYN) 500 MG tablet TAKE 1 TABLET BY MOUTH EVERY 12 HOURS AS NEEDED for pain Yes Historical Provider, MD   methylPREDNISolone (MEDROL DOSEPACK) 4 MG tablet Take by mouth.  Yes NICO Simpson CNP   cyclobenzaprine (FLEXERIL) 10 MG tablet Take 1 tablet by mouth 3 times daily as needed for Muscle spasms Yes NICO Simpson CNP   metoprolol tartrate (LOPRESSOR) 50 MG tablet TAKE 1 TABLETS BY MOUTH TWICE DAILY Yes NICO Simpson CNP   pantoprazole (PROTONIX) 40 MG tablet Take 1 tablet by mouth 2 times daily Yes NICO Simpson CNP   levocetirizine (XYZAL) 5 MG tablet Take 1 tablet by mouth nightly Yes NICO Simpson CNP   promethazine (PHENERGAN) 25 MG tablet Take 1 tablet by mouth every 6 hours as needed for Nausea Yes NICO Simpson CNP   lisinopril (PRINIVIL;ZESTRIL) 20 MG tablet Take 1 tablet by mouth daily Yes NICO Simpson CNP   venlafaxine (EFFEXOR XR) 150 MG extended release capsule  Yes Svitlanachristian Darling   TRAZODONE HCL  mg  Yes Svitlanastephanie Darling   dicyclomine (BENTYL) 20 MG tablet Take 1 tablet by mouth every 6 hours Yes NICO Reed Ala, CNP   docusate sodium (DOK) 100 MG capsule Take 1 capsule by mouth 2 times daily as needed for Constipation Yes Sophie Richardson Leann, APRN - CNP   REXULTI 1 MG TABS tablet TAKE 1 TABLET BY MOUTH DAILY  Historical Provider, MD       Social History     Tobacco Use    Smoking status: Current Every Day Smoker     Packs/day: 0.30     Years: 17.00     Pack years: 5.10     Types: Cigarettes    Smokeless tobacco: Never Used   Substance Use Topics    Alcohol use: No    Drug use: No        Allergies   Allergen Reactions    Iodides     Lamotrigine Other (See Comments)     Bruising, aggression   ,   Past Medical History:   Diagnosis Date    Anxiety     Asthma     CAD (coronary artery disease)     Tachycardia    Depression 10/29/2013    Dyslipidemia     Elevated blood pressure     Fibromyalgia     Fingernail abnormalities     right middle fingernail deformity    Generalized anxiety disorder     GERD (gastroesophageal reflux disease)     HPV (human papillomavirus)     Hypertension     IBS (irritable bowel syndrome) 2012    Migraine     Obesity     Pap smear for cervical cancer screening     - normal, pcos    PCOS (polycystic ovarian syndrome)     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[address]     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   ,   Past Surgical History:   Procedure Laterality Date    ARM SURGERY Left     ulna shortening    CARPAL TUNNEL RELEASE Bilateral      SECTION      CHOLECYSTECTOMY      LUMBAR [facility] SURGERY  10/10/2018    DR Daniels [place] EGD TRANSORAL BIOPSY SINGLE/MULTIPLE N/A 2/15/2018    EGD ESOPHAGOGASTRODUODENOSCOPY performed by Kyree No MD at [address] ARTHROSCOPY Left     SHOULDER ARTHROSCOPY Left 2017    Jossie Schmitz MD     WRIST ARTHROSCOPY Left    ,   Social History     Tobacco Use    Smoking status: Current Every Day Smoker     Packs/day: 0.30     Years: 17.00     Pack years: 5.10     Types: Cigarettes    Smokeless tobacco: Never Used   Substance Use Topics    Alcohol use: No    Drug use: No   ,   Family History   Problem Relation Age of Onset    Arthritis Mother    [employer] Depression Mother     Depression Father     Diabetes Other     Cancer Other     Stroke Other     Obesity Other     Diabetes Maternal Aunt     Diabetes Maternal Uncle     Cancer Maternal Grandmother         colon    Cancer Paternal Grandfather    ,   Immunization History   Administered Date(s) Administered    Influenza 12/23/2011    Influenza, SASKIA Kennedy, (6 mo and older Fluzone, Flulaval, Fluarix and 3 yrs and older Afluria) 11/01/2018, 11/14/2019    Pneumococcal Polysaccharide ([id number]) 01/25/2018   ,   Health Maintenance   Topic Date Due    Varicella vaccine (1 of 2 - 2-dose childhood series) 07/28/1986    DTaP/Tdap/Td vaccine (1 - Tdap) 07/28/2004    Cervical cancer screen  04/01/2016    Flu vaccine (1) 09/01/2020    Potassium monitoring  03/06/2021    Creatinine monitoring  03/06/2021    Pneumococcal 0-64 years Vaccine  Completed    HIV screen  Completed    Hepatitis A vaccine  Aged Out    Hepatitis B vaccine  Aged Out    Hib vaccine  Aged Out    Meningococcal (ACWY) vaccine  Aged Out       PHYSICAL EXAMINATION:  [ INSTRUCTIONS:  \"[x]\" Indicates a positive item  \"[]\" Indicates a negative item  -- DELETE ALL ITEMS NOT EXAMINED]  [x] Alert  [x] Oriented to person/place/time    [x] No apparent distress  [] Toxic appearing    [] Face flushed appearing [x] Sclera clear  [] Lips are cyanotic      [x] Breathing appears normal  [] Appears tachypneic      [] Rash on visible skin    [x] Cranial Nerves II-XII grossly intact    [x] Motor grossly intact in visible upper extremities    [x] Motor grossly intact in visible lower extremities    [x] Normal Mood  [] Anxious appearing    [] Depressed appearing  [] Confused appearing      [] Poor short term memory  [] Poor long term memory    [] OTHER:      Due to this being a TeleHealth encounter, evaluation of the following organ systems is limited: Vitals/Constitutional/EENT/Resp/CV/GI//MS/Neuro/Skin/Heme-Lymph-Imm.     ASSESSMENT/PLAN:   Diagnosis Orders   1. Dysfunction of both eustachian tubes  methylPREDNISolone (MEDROL DOSEPACK) 4 MG tablet   2. Non-recurrent acute serous otitis media of both ears  methylPREDNISolone (MEDROL DOSEPACK) 4 MG tablet   3. Irregular heart rate  Holter Monitor 24 Hour     Medrol dosepack as ordered. F/u if no improvement. Holter monitor as ordered. Will call with results. Side effects, adverse effects of the medication prescribed today, as well as treatment plan/ rationale and result expectations have been discussed with the patient who expresses understanding and desires to proceed. Close follow up to evaluate treatment results and for coordination of care. I have reviewed the patient's medical history in detail and updated the computerized patient record. As always, patient is advised that if symptoms worsen in any way they will proceed to the nearest emergency room. Return if symptoms worsen or fail to improve. An  electronic signature was used to authenticate this note.     --NICO Perry - CNP on 9/11/2020 at 4:42 PM

## 2020-10-13 ENCOUNTER — VIRTUAL VISIT (OUTPATIENT)
Dept: FAMILY MEDICINE CLINIC | Age: 35
End: 2020-10-13
Payer: COMMERCIAL

## 2020-10-13 PROCEDURE — G8427 DOCREV CUR MEDS BY ELIG CLIN: HCPCS | Performed by: NURSE PRACTITIONER

## 2020-10-13 PROCEDURE — 4004F PT TOBACCO SCREEN RCVD TLK: CPT | Performed by: NURSE PRACTITIONER

## 2020-10-13 PROCEDURE — G8417 CALC BMI ABV UP PARAM F/U: HCPCS | Performed by: NURSE PRACTITIONER

## 2020-10-13 PROCEDURE — G8484 FLU IMMUNIZE NO ADMIN: HCPCS | Performed by: NURSE PRACTITIONER

## 2020-10-13 PROCEDURE — 99213 OFFICE O/P EST LOW 20 MIN: CPT | Performed by: NURSE PRACTITIONER

## 2020-10-13 RX ORDER — AMOXICILLIN AND CLAVULANATE POTASSIUM 875; 125 MG/1; MG/1
1 TABLET, FILM COATED ORAL 2 TIMES DAILY
Qty: 20 TABLET | Refills: 0 | Status: SHIPPED | OUTPATIENT
Start: 2020-10-13 | End: 2020-10-23

## 2020-10-13 RX ORDER — FLUTICASONE PROPIONATE 50 MCG
1 SPRAY, SUSPENSION (ML) NASAL DAILY
Qty: 1 BOTTLE | Refills: 0 | Status: SHIPPED | OUTPATIENT
Start: 2020-10-13 | End: 2021-05-18

## 2020-10-13 ASSESSMENT — ENCOUNTER SYMPTOMS
BACK PAIN: 0
COUGH: 0
ABDOMINAL PAIN: 0
SWOLLEN GLANDS: 0
SORE THROAT: 0
SINUS PAIN: 1
ABDOMINAL DISTENTION: 0
COLOR CHANGE: 0
SHORTNESS OF BREATH: 0
RHINORRHEA: 1
SINUS PRESSURE: 1
PHOTOPHOBIA: 0

## 2020-10-13 NOTE — PROGRESS NOTES
10/13/2020    TELEHEALTH EVALUATION -- Audio/Visual (During RSSVN-75 public health emergency)    Due to COVID 19 outbreak, patient's office visit was converted to a virtual visit. Patient was contacted and agreed to proceed with a virtual visit via [employer]. me  The risks and benefits of converting to a virtual visit were discussed in light of the current infectious disease epidemic. Patient also understood that insurance coverage and co-pays are up to their individual insurance plans. HPI:    Evelyne Shahid (:  1985) has requested an audio/video evaluation for the following concern(s):    Chief Complaint   Patient presents with    Sinusitis     states that symptoms started with her allergies and now she has a stuffy nose, sneezing, watery eyes, sinus headache. takes xyzal at night. temp is 99.4       Sinusitis   This is a new problem. The current episode started 1 to 4 weeks ago (a couple of weeks ago). The problem has been gradually worsening since onset. There has been no fever. The pain is moderate. Associated symptoms include congestion, ear pain, headaches, sinus pressure and sneezing. Pertinent negatives include no chills, coughing, diaphoresis, shortness of breath, sore throat or swollen glands. (Stuffy nose, watery eyes) Treatments tried: cold/flu daytime/nighttime medicine, ibuprofen, xyzal. The treatment provided no relief. Still with ear pain from last visit. Treated with medrol dosepack with no improvement. Review of Systems   Constitutional: Negative for chills, diaphoresis, fatigue and fever. HENT: Positive for congestion, ear pain, rhinorrhea, sinus pressure, sinus pain and sneezing. Negative for sore throat. Eyes: Negative for photophobia and visual disturbance. Respiratory: Negative for cough and shortness of breath. Cardiovascular: Negative for palpitations and leg swelling. Gastrointestinal: Negative for abdominal distention and abdominal pain.    Musculoskeletal: Negative for arthralgias and back pain. Skin: Negative for color change and rash. Neurological: Positive for headaches. Negative for dizziness. Psychiatric/Behavioral: Negative for dysphoric mood. The patient is not hyperactive. Prior to Visit Medications    Medication Sig Taking?  Authorizing Provider   amoxicillin-clavulanate (AUGMENTIN) 875-125 MG per tablet Take 1 tablet by mouth 2 times daily for 10 days Yes NICO Stubbs CNP   fluticasone (FLONASE) 50 MCG/ACT nasal spray 1 spray by Each Nostril route daily Yes NICO Stubbs CNP   venlafaxine (EFFEXOR XR) 75 MG extended release capsule TAKE 1 CAPSULE EVERY EVENING Yes Historical Provider, MD   REXULTI 1 MG TABS tablet TAKE 1 TABLET BY MOUTH DAILY Yes Historical Provider, MD   carBAMazepine (TEGRETOL) 200 MG tablet TAKE 1 TABLET BY MOUTH TWICE DAILY Yes Historical Provider, MD   cyclobenzaprine (FLEXERIL) 10 MG tablet Take 1 tablet by mouth 3 times daily as needed for Muscle spasms Yes NICO Stubbs CNP   metoprolol tartrate (LOPRESSOR) 50 MG tablet TAKE 1 TABLETS BY MOUTH TWICE DAILY Yes NICO Stubbs CNP   pantoprazole (PROTONIX) 40 MG tablet Take 1 tablet by mouth 2 times daily Yes NICO Stubbs CNP   levocetirizine (XYZAL) 5 MG tablet Take 1 tablet by mouth nightly Yes NICO Stubbs CNP   promethazine (PHENERGAN) 25 MG tablet Take 1 tablet by mouth every 6 hours as needed for Nausea Yes NICO Stubbs CNP   lisinopril (PRINIVIL;ZESTRIL) 20 MG tablet Take 1 tablet by mouth daily Yes NICO Stubbs CNP   venlafaxine (EFFEXOR XR) 150 MG extended release capsule  Yes Svitlana Darling   TRAZODONE HCL  mg  Yes Svitlana Darling   dicyclomine (BENTYL) 20 MG tablet Take 1 tablet by mouth every 6 hours Yes NICO Rsoe CNP   docusate sodium (DOK) 100 MG capsule Take 1 capsule by mouth 2 times daily as needed for Constipation Yes Bella Sagastume Leann, APRN - CNP       Social History     Tobacco Use    Smoking status: Current Every Day Smoker     Packs/day: 0.30     Years: 17.00     Pack years: 5.10     Types: Cigarettes    Smokeless tobacco: Never Used   Substance Use Topics    Alcohol use: No    Drug use: No        Allergies   Allergen Reactions    Iodides     Lamotrigine Other (See Comments)     Bruising, aggression   ,   Past Medical History:   Diagnosis Date    Anxiety     Asthma     CAD (coronary artery disease)     Tachycardia    Depression 10/29/2013    Dyslipidemia     Elevated blood pressure     Fibromyalgia     Fingernail abnormalities     right middle fingernail deformity    Generalized anxiety disorder     GERD (gastroesophageal reflux disease)     HPV (human papillomavirus)     Hypertension     IBS (irritable bowel syndrome) 2012    Migraine     Obesity     Pap smear for cervical cancer screening     - normal, pcos    PCOS (polycystic ovarian syndrome)     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[address]     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   ,   Past Surgical History:   Procedure Laterality Date    ARM SURGERY Left     ulna shortening    CARPAL TUNNEL RELEASE Bilateral      SECTION      CHOLECYSTECTOMY      LUMBAR [address] SURGERY  10/10/2018    DR Herron Cornea [place] EGD TRANSORAL BIOPSY SINGLE/MULTIPLE N/A 2/15/2018    EGD ESOPHAGOGASTRODUODENOSCOPY performed by Sulma Schilling MD at [address] ARTHROSCOPY Left     SHOULDER ARTHROSCOPY Left 2017    Tata Lozano MD     WRIST ARTHROSCOPY Left    ,   Social History     Tobacco Use    Smoking status: Current Every Day Smoker     Packs/day: 0.30     Years: 17.00     Pack years: 5.10     Types: Cigarettes    Smokeless tobacco: Never Used   Substance Use Topics    Alcohol use: No    Drug use: No   ,   Family History   Problem Relation Age of Onset    Arthritis Mother     Depression Mother     Depression Father     Diabetes Other     Cancer Other     Stroke Other     Obesity Other     Diabetes Maternal Aunt     Diabetes Maternal Uncle     Cancer Maternal Grandmother         colon    Cancer Paternal Grandfather    ,   Immunization History   Administered Date(s) Administered    Influenza 12/23/2011    Influenza, Danielle Broody, IM, (6 mo and older Fluzone, Flulaval, Fluarix and 3 yrs and older Afluria) 11/01/2018, 11/14/2019    Pneumococcal Polysaccharide ([id number]) 01/25/2018   ,   Health Maintenance   Topic Date Due    Varicella vaccine (1 of 2 - 2-dose childhood series) 07/28/1986    DTaP/Tdap/Td vaccine (1 - Tdap) 07/28/2004    Cervical cancer screen  04/01/2016    Flu vaccine (1) 09/01/2020    Potassium monitoring  03/06/2021    Creatinine monitoring  03/06/2021    Pneumococcal 0-64 years Vaccine  Completed    HIV screen  Completed    Hepatitis A vaccine  Aged Out    Hepatitis B vaccine  Aged Out    Hib vaccine  Aged Out    Meningococcal (ACWY) vaccine  Aged Out       PHYSICAL EXAMINATION:  [ INSTRUCTIONS:  \"[x]\" Indicates a positive item  \"[]\" Indicates a negative item  -- DELETE ALL ITEMS NOT EXAMINED]  [x] Alert  [x] Oriented to person/place/time    [x] No apparent distress  [] Toxic appearing    [] Face flushed appearing [x] Sclera clear  [] Lips are cyanotic      [x] Breathing appears normal  [] Appears tachypneic      [] Rash on visible skin    [x] Cranial Nerves II-XII grossly intact    [x] Motor grossly intact in visible upper extremities    [x] Motor grossly intact in visible lower extremities    [x] Normal Mood  [] Anxious appearing    [] Depressed appearing  [] Confused appearing      [] Poor short term memory  [] Poor long term memory    [] OTHER:      Due to this being a TeleHealth encounter, evaluation of the following organ systems is limited: Vitals/Constitutional/EENT/Resp/CV/GI//MS/Neuro/Skin/Heme-Lymph-Imm. ASSESSMENT/PLAN:   Diagnosis Orders   1.  Acute non-recurrent pansinusitis  amoxicillin-clavulanate (AUGMENTIN) 875-125 MG per tablet    fluticasone (FLONASE) 50 MCG/ACT nasal spray   2. Antibiotic-induced yeast infection     3. Dysfunction of both eustachian tubes  External Referral to ENT     Pt advised to rest and drink plenty of fluids. Finish all antibiotics even if feeling better. OTC analgesics for symptom management. Salt water gargle for sore throat. RTO if symptoms worsen or if no improvement in 72 hours. Referral to ENT as ordered. No diflucan d/t interaction with tegretol. Can use OTC monistat for yeast infection. Side effects, adverse effects of the medication prescribed today, as well as treatment plan/ rationale and result expectations have been discussed with the patient who expresses understanding and desires to proceed. Close follow up to evaluate treatment results and for coordination of care. I have reviewed the patient's medical history in detail and updated the computerized patient record. As always, patient is advised that if symptoms worsen in any way they will proceed to the nearest emergency room. Return if symptoms worsen or fail to improve. An  electronic signature was used to authenticate this note.     --NICO Girard - CNP on 10/13/2020 at 4:37 PM

## 2020-10-15 ENCOUNTER — TELEPHONE (OUTPATIENT)
Dept: FAMILY MEDICINE CLINIC | Age: 35
End: 2020-10-15

## 2020-10-15 NOTE — TELEPHONE ENCOUNTER
Please have patient call insurance to find out who is in network in her area and let me know so I can do referral then.

## 2020-10-15 NOTE — TELEPHONE ENCOUNTER
Dr. Saba Wilhelm office (ENT) called and states that he is not interested in seeing this pt. I asked the lady what the reason would be, and she said that it may be due to the insurance, but wasn't really sure of the reason.

## 2020-11-30 ENCOUNTER — VIRTUAL VISIT (OUTPATIENT)
Dept: FAMILY MEDICINE CLINIC | Age: 35
End: 2020-11-30
Payer: COMMERCIAL

## 2020-11-30 PROCEDURE — 99213 OFFICE O/P EST LOW 20 MIN: CPT | Performed by: NURSE PRACTITIONER

## 2020-11-30 PROCEDURE — G8427 DOCREV CUR MEDS BY ELIG CLIN: HCPCS | Performed by: NURSE PRACTITIONER

## 2020-11-30 RX ORDER — AZITHROMYCIN 250 MG/1
250 TABLET, FILM COATED ORAL DAILY
Qty: 6 TABLET | Refills: 0 | Status: SHIPPED | OUTPATIENT
Start: 2020-11-30 | End: 2020-12-05

## 2020-11-30 RX ORDER — METHYLPREDNISOLONE 4 MG/1
TABLET ORAL
Qty: 1 KIT | Refills: 0 | Status: SHIPPED | OUTPATIENT
Start: 2020-11-30 | End: 2020-12-06

## 2020-11-30 RX ORDER — BROMPHENIRAMINE MALEATE, PSEUDOEPHEDRINE HYDROCHLORIDE, AND DEXTROMETHORPHAN HYDROBROMIDE 2; 30; 10 MG/5ML; MG/5ML; MG/5ML
5 SYRUP ORAL 4 TIMES DAILY PRN
Qty: 180 ML | Refills: 0 | Status: SHIPPED | OUTPATIENT
Start: 2020-11-30 | End: 2020-12-10

## 2020-11-30 ASSESSMENT — ENCOUNTER SYMPTOMS
WHEEZING: 1
NAUSEA: 0
SORE THROAT: 1
DIARRHEA: 0
SHORTNESS OF BREATH: 1
COUGH: 1
CHEST TIGHTNESS: 1
VOMITING: 0
RHINORRHEA: 1

## 2020-11-30 NOTE — PATIENT INSTRUCTIONS
Patient Education        Learning About Being High-Risk for COVID-19  Who is at high risk? COVID-19 causes a mild illness in many people who have it. But certain things may increase your risk for more serious illness. These include:  · Age. The risk increases with age. Older adults are at highest risk. · Smoking. · Obesity. · Living in a long-term care facility. · Having ongoing serious health issues. Some examples are:  ? Chronic lung disease or asthma. ? Heart problems. ? A weakened immune system. ? Cancer treatment. ? Diabetes. ? Chronic kidney disease and having dialysis. ? Sickle cell disease. This is not a complete list. If you have a chronic health problem, ask your doctor if you should take extra precautions during the outbreak. If you are pregnant, it's safest to consider yourself at higher risk. You and your baby may be at risk for pregnancy problems, such as  birth. And you may be at higher risk for serious illness if you get COVID-19. How do you stay safe? · Stay home. ? Stay home as much as you can. This may be the easiest way to avoid exposure, as long as no one else in your household has the virus. ? If there are a lot of COVID-19 cases in your community, do not leave your home except to seek medical care. ? Limit visitors right now. It's especially important to avoid contact with anyone who is sick or who might have been exposed. Remember that people may have been exposed without knowing it or having any symptoms. ? Have enough food, medicines, and other supplies on hand so that you don't have to go out. Try some of these options if you don't have what you need. ? Use delivery and takeout services for groceries and meals. ? Have a healthy family member, friend, or neighbor shop for you. ? Ask your doctor for extra prescription medicine. ? Routinely clean and disinfect high-touch surfaces. These include countertops, faucets, door handles, doorknobs, and phones.   ? Do not travel. · Wash your hands often and well. ? Wash your hands often, especially after you cough or sneeze. Use soap and water, and scrub for at least 20 seconds. If soap and water aren't available, use an alcohol-based hand . · Be extra careful if you have to go out. ? Avoid crowds and crowded places. Try to keep 6 feet of space between yourself and others. ? Don't use public transportation, ride-shares, or taxis unless you have no choice. ? Try not to touch things that many other people have touched. Door handles, elevator buttons, shopping cart handles, and handrails on escalators get a lot of touches. ? Carry tissues or paper towels with you. If you must touch something, you'll be able to protect your hands. ? Don't shake hands with anyone. Try a friendly wave instead. ? Don't touch your face, and wash your hands often. ? Wash your hands again as soon as you get home. · Know when to call your doctor. ? Call a doctor if you have symptoms of COVID-19 (fever, cough, shortness of breath). If you are told to get testing or care and must go out, wear a cloth face cover. Current as of: July 10, 2020               Content Version: 12.6  © 2006-2020 [employer]. Care instructions adapted under license by [facility] ([facility]). If you have questions about a medical condition or this instruction, always ask your healthcare professional. [place] [address] any warranty or liability for your use of this information.

## 2020-11-30 NOTE — PROGRESS NOTES
The location for this appointment was the [address] [place] [address] primary care site. TELEHEALTH APPOINTMENT  Patient has been screened to determine that this visit qualifies for a \"Video Visit\". This visit was via video due to the restrictions of the COVID-19 pandemic. All issues as below were discussed and addressed but note a limited visually based physical exam was performed. If it was felt the patient should be evaluated in the clinic there will be comment below demonstrating they were directed there. The patient is aware and has given verbal consent to be billed for this video encounter. The resources used for this visit were [employer] for chart access and [employer]. me    Chief Complaint   Patient presents with    Concern For COVID-19       HPI  Started yesterday. Patient has a cough, sore throat, fatigued, body aches. No known exposure.     PMH:    Current Outpatient Medications on File Prior to Visit   Medication Sig Dispense Refill    fluticasone (FLONASE) 50 MCG/ACT nasal spray 1 spray by Each Nostril route daily 1 Bottle 0    venlafaxine (EFFEXOR XR) 75 MG extended release capsule TAKE 1 CAPSULE EVERY EVENING      REXULTI 1 MG TABS tablet TAKE 1 TABLET BY MOUTH DAILY      carBAMazepine (TEGRETOL) 200 MG tablet TAKE 1 TABLET BY MOUTH TWICE DAILY      cyclobenzaprine (FLEXERIL) 10 MG tablet Take 1 tablet by mouth 3 times daily as needed for Muscle spasms 30 tablet 3    metoprolol tartrate (LOPRESSOR) 50 MG tablet TAKE 1 TABLETS BY MOUTH TWICE DAILY 60 tablet 3    pantoprazole (PROTONIX) 40 MG tablet Take 1 tablet by mouth 2 times daily 60 tablet 3    levocetirizine (XYZAL) 5 MG tablet Take 1 tablet by mouth nightly 30 tablet 3    promethazine (PHENERGAN) 25 MG tablet Take 1 tablet by mouth every 6 hours as needed for Nausea 30 tablet 3    lisinopril (PRINIVIL;ZESTRIL) 20 MG tablet Take 1 tablet by mouth daily 30 tablet 3    venlafaxine (EFFEXOR XR) 150 MG extended release capsule       TRAZODONE HCL  mg       dicyclomine (BENTYL) 20 MG tablet Take 1 tablet by mouth every 6 hours 120 tablet 3    docusate sodium (DOK) 100 MG capsule Take 1 capsule by mouth 2 times daily as needed for Constipation 60 capsule 3     No current facility-administered medications on file prior to visit.       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[address]     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     Past Surgical History:   Procedure Laterality Date    ARM SURGERY Left     ulna shortening    CARPAL TUNNEL RELEASE Bilateral      SECTION      CHOLECYSTECTOMY      LUMBAR [facility] [address] SURGERY  10/10/2018    DR Dalton Kendalla NE EGD TRANSORAL BIOPSY SINGLE/MULTIPLE N/A 2/15/2018    EGD ESOPHAGOGASTRODUODENOSCOPY performed by Denisse Munoz MD at [address] ARTHROSCOPY Left     SHOULDER ARTHROSCOPY Left 2017    Soto Abrams MD     WRIST ARTHROSCOPY Left      Social History     Socioeconomic History    Marital status:      Spouse name: Not on file    Number of children: Not on file    Years of education: Not on file    Highest education level: Not on file   Occupational History    Not on file   Social Needs    Financial resource strain: Not hard at all    Food insecurity     Worry: Often true     Inability: Sometimes true    Transportation needs     Medical: No     Non-medical: No   Tobacco Use    Smoking status: Current Every Day Smoker     Packs/day: 0.30     Years: 17.00     Pack years: 5.10     Types: Cigarettes    Smokeless tobacco: Never Used   Substance and Sexual Activity    Alcohol use: No    Drug use: No    Sexual activity: Never   Lifestyle    Physical activity     Days per week: Not on file     Minutes per session: Not on file    Stress: Not on file   Relationships    Social connections     Talks on phone: Not on file     Gets together: Not on file     Attends Taoism service: Not on file     Active member of club or organization: Not on file     Attends meetings of clubs or organizations: Not on file     Relationship status: Not on file    Intimate partner violence     Fear of current or ex partner: Not on file     Emotionally abused: Not on file     Physically abused: Not on file     Forced sexual activity: Not on file   Other Topics Concern    Not on file   Social History Narrative    Not on file     Family History   Problem Relation Age of Onset    Arthritis Mother     Depression Mother     Depression Father     Diabetes Other     Cancer Other     Stroke Other     Obesity Other     Diabetes Maternal Aunt     Diabetes Maternal Uncle     Cancer Maternal Grandmother         colon    Cancer Paternal Grandfather      Allergies: Iodides and Lamotrigine    Review of Systems   Constitutional: Positive for chills and fatigue. Negative for diaphoresis and fever. HENT: Positive for rhinorrhea and sore throat. Negative for congestion. Loss of smell   Respiratory: Positive for cough (dry), chest tightness (mild), shortness of breath (mild) and wheezing. Gastrointestinal: Negative for diarrhea, nausea and vomiting. Musculoskeletal: Positive for myalgias. Neurological: Positive for headaches.        PHYSICAL EXAM/ RESULTS    (Limited exam: only performed what is available through a visual exam during the video encounter as indicated due to the restrictions of the COVID-19 pandemic)    There were no vitals taken for this visit. Physical Exam    No results found for this or any previous visit (from the past 2016 hour(s)). Assessment:       Diagnosis Orders   1. Encounter by telehealth for suspected COVID-19  COVID-19 Ambulatory    azithromycin (ZITHROMAX) 250 MG tablet    methylPREDNISolone (MEDROL DOSEPACK) 4 MG tablet    brompheniramine-pseudoephedrine-DM (BROMFED DM) 2-30-10 MG/5ML syrup   2. Mild persistent asthma with exacerbation  azithromycin (ZITHROMAX) 250 MG tablet    methylPREDNISolone (MEDROL DOSEPACK) 4 MG tablet         Orders Placed This Encounter   Procedures    COVID-19 Ambulatory     Standing Status:   Future     Standing Expiration Date:   11/30/2021     Scheduling Instructions:      Saline media preferred given current shortage of viral transport media but both acceptable     Order Specific Question:   Is this test for diagnosis or screening? Answer:   Diagnosis of ill patient     Order Specific Question:   Symptomatic for COVID-19 as defined by CDC? Answer:   Yes     Order Specific Question:   Date of Symptom Onset     Answer:   11/29/2020     Order Specific Question:   Hospitalized for COVID-19? Answer:   No     Order Specific Question:   Admitted to ICU for COVID-19? Answer:   No     Order Specific Question:   Employed in healthcare setting? Answer:   No     Order Specific Question:   Resident in a congregate (group) care setting? Answer:   No     Order Specific Question:   Pregnant? Answer:   No     Order Specific Question:   Previously tested for COVID-19? Answer:   No         Plan:   Return if symptoms worsen or fail to improve, for follow up with your PCP. Patient Instructions     Patient Education        Learning About Being High-Risk for COVID-19  Who is at high risk? COVID-19 causes a mild illness in many people who have it. But certain things may increase your risk for more serious illness. These include:  · Age.  The risk increases with age. Older adults are at highest risk. · Smoking. · Obesity. · Living in a long-term care facility. · Having ongoing serious health issues. Some examples are:  ? Chronic lung disease or asthma. ? Heart problems. ? A weakened immune system. ? Cancer treatment. ? Diabetes. ? Chronic kidney disease and having dialysis. ? Sickle cell disease. This is not a complete list. If you have a chronic health problem, ask your doctor if you should take extra precautions during the outbreak. If you are pregnant, it's safest to consider yourself at higher risk. You and your baby may be at risk for pregnancy problems, such as  birth. And you may be at higher risk for serious illness if you get COVID-19. How do you stay safe? · Stay home. ? Stay home as much as you can. This may be the easiest way to avoid exposure, as long as no one else in your household has the virus. ? If there are a lot of COVID-19 cases in your community, do not leave your home except to seek medical care. ? Limit visitors right now. It's especially important to avoid contact with anyone who is sick or who might have been exposed. Remember that people may have been exposed without knowing it or having any symptoms. ? Have enough food, medicines, and other supplies on hand so that you don't have to go out. Try some of these options if you don't have what you need. ? Use delivery and takeout services for groceries and meals. ? Have a healthy family member, friend, or neighbor shop for you. ? Ask your doctor for extra prescription medicine. ? Routinely clean and disinfect high-touch surfaces. These include countertops, faucets, door handles, doorknobs, and phones. ? Do not travel. · Wash your hands often and well. ? Wash your hands often, especially after you cough or sneeze. Use soap and water, and scrub for at least 20 seconds. If soap and water aren't available, use an alcohol-based hand .   · Be extra careful if you have to go out. ? Avoid crowds and crowded places. Try to keep 6 feet of space between yourself and others. ? Don't use public transportation, ride-shares, or taxis unless you have no choice. ? Try not to touch things that many other people have touched. Door handles, elevator buttons, shopping cart handles, and handrails on escalators get a lot of touches. ? Carry tissues or paper towels with you. If you must touch something, you'll be able to protect your hands. ? Don't shake hands with anyone. Try a friendly wave instead. ? Don't touch your face, and wash your hands often. ? Wash your hands again as soon as you get home. · Know when to call your doctor. ? Call a doctor if you have symptoms of COVID-19 (fever, cough, shortness of breath). If you are told to get testing or care and must go out, wear a cloth face cover. Current as of: July 10, 2020               Content Version: 12.6  © 2006-2020 , [facility]. Care instructions adapted under license by [facility] ([facility]). If you have questions about a medical condition or this instruction, always ask your healthcare professional. [place] [address] any warranty or liability for your use of this information. Side effects and adverse effects of any medication prescribed today, as well as treatment plan/rationale, follow-up care, and result expectations have been discussed with the patient. Expresses understanding and desires to proceed with treatment plan. Discussed signs and symptoms which require immediate follow-up in ED/call to 911. Understanding verbalized. I have reviewed and updated the electronic medical record. As always, if symptoms worsen, go directly to ED. The resources used for this visit were [employer] for chart access and [employer]. NICO Pinedo CNP      An  electronic signature was used to authenticate this note.     --NICO Haynes CNP on 11/30/2020 at 1:07 PM        Pursuant to the emergency declaration under the [facility], [facility] waiver authority and the [employer] Act, this Virtual  Visit was conducted, with patient's consent, to reduce the patient's risk of exposure to COVID-19 and provide continuity of care for an established patient. Services were provided through a video synchronous discussion virtually to substitute for in-person clinic visit.

## 2020-12-03 ENCOUNTER — NURSE ONLY (OUTPATIENT)
Dept: FAMILY MEDICINE CLINIC | Age: 35
End: 2020-12-03

## 2020-12-03 DIAGNOSIS — Z20.822 ENCOUNTER BY TELEHEALTH FOR SUSPECTED COVID-19: ICD-10-CM

## 2020-12-06 LAB
SARS-COV-2: DETECTED
SOURCE: ABNORMAL

## 2020-12-06 RX ORDER — LEVOFLOXACIN 750 MG/1
750 TABLET ORAL DAILY
Qty: 7 TABLET | Refills: 0 | Status: SHIPPED | OUTPATIENT
Start: 2020-12-06 | End: 2020-12-13

## 2020-12-06 RX ORDER — PREDNISONE 10 MG/1
TABLET ORAL
Qty: 32 TABLET | Refills: 0 | Status: SHIPPED | OUTPATIENT
Start: 2020-12-06 | End: 2020-12-18 | Stop reason: ALTCHOICE

## 2020-12-18 ENCOUNTER — VIRTUAL VISIT (OUTPATIENT)
Dept: FAMILY MEDICINE CLINIC | Age: 35
End: 2020-12-18
Payer: COMMERCIAL

## 2020-12-18 PROCEDURE — 99442 PR PHYS/QHP TELEPHONE EVALUATION 11-20 MIN: CPT | Performed by: NURSE PRACTITIONER

## 2020-12-18 RX ORDER — RISPERIDONE 2 MG/1
2 TABLET, FILM COATED ORAL DAILY
COMMUNITY
End: 2021-05-18 | Stop reason: ALTCHOICE

## 2020-12-18 RX ORDER — BUPROPION HYDROCHLORIDE 150 MG/1
150 TABLET, EXTENDED RELEASE ORAL DAILY
COMMUNITY
End: 2021-05-18 | Stop reason: ALTCHOICE

## 2020-12-18 ASSESSMENT — ENCOUNTER SYMPTOMS
SHORTNESS OF BREATH: 0
BACK PAIN: 0
ABDOMINAL DISTENTION: 0
DIARRHEA: 1
ABDOMINAL PAIN: 0
COLOR CHANGE: 1
COUGH: 0
PHOTOPHOBIA: 0
CHEST TIGHTNESS: 0

## 2020-12-18 NOTE — PROGRESS NOTES
No current facility-administered medications on file prior to visit.       Past Medical History:   Diagnosis Date    Anxiety     Asthma     CAD (coronary artery disease)     Tachycardia    Depression 10/29/2013    Dyslipidemia     Elevated blood pressure     Fibromyalgia     Fingernail abnormalities     right middle fingernail deformity    Generalized anxiety disorder     GERD (gastroesophageal reflux disease)     HPV (human papillomavirus)     Hypertension     IBS (irritable bowel syndrome) 2012    Migraine     Obesity     Pap smear for cervical cancer screening     2009- normal, pcos    PCOS (polycystic ovarian syndrome)     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[address]     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     Past Surgical History:   Procedure Laterality Date    ARM SURGERY Left     ulna shortening    CARPAL TUNNEL RELEASE Bilateral      SECTION      CHOLECYSTECTOMY      LUMBAR [address] SURGERY  10/10/2018    DR Chandni Aguirre MD EGD TRANSORAL BIOPSY SINGLE/MULTIPLE N/A 2/15/2018    EGD ESOPHAGOGASTRODUODENOSCOPY performed by Elver Toth MD at [address] ARTHROSCOPY Left     SHOULDER ARTHROSCOPY Left 2017    Breanna Hester MD     WRIST ARTHROSCOPY Left      Social History     Socioeconomic History    Marital status:      Spouse name: Not on file    Number of children: Not on file    Years of education: Not on file    Highest education level: Not on file   Occupational History    Not on file   Social Needs    Financial resource strain: Not hard at all    Food insecurity     Worry: Often true     Inability: Sometimes true    Transportation needs     Medical: No     Non-medical: No   Tobacco Use    Smoking status: Current Every Day Smoker     Packs/day: 0.30     Years: 17.00     Pack years: 5.10     Types: Cigarettes    Smokeless tobacco: Never Used   Substance and Sexual Activity    Alcohol use: No    Drug use: No    Sexual activity: Never   Lifestyle  Physical activity     Days per week: Not on file     Minutes per session: Not on file    Stress: Not on file   Relationships    Social connections     Talks on phone: Not on file     Gets together: Not on file     Attends Druze service: Not on file     Active member of club or organization: Not on file     Attends meetings of clubs or organizations: Not on file     Relationship status: Not on file    Intimate partner violence     Fear of current or ex partner: Not on file     Emotionally abused: Not on file     Physically abused: Not on file     Forced sexual activity: Not on file   Other Topics Concern    Not on file   Social History Narrative    Not on file     Family History   Problem Relation Age of Onset    Arthritis Mother     Depression Mother     Depression Father     Diabetes Other     Cancer Other     Stroke Other     Obesity Other     Diabetes Maternal Aunt     Diabetes Maternal Uncle     Cancer Maternal Grandmother         colon    Cancer Paternal Grandfather      Allergies: Iodides and Lamotrigine    Review of Systems   Constitutional: Positive for fatigue. Negative for chills, diaphoresis and fever. HENT: Negative for congestion and ear pain. Eyes: Negative for photophobia and visual disturbance. Respiratory: Negative for cough, chest tightness and shortness of breath. Cardiovascular: Negative for chest pain, palpitations and leg swelling. Gastrointestinal: Positive for diarrhea. Negative for abdominal distention and abdominal pain. Endocrine: Negative for polydipsia, polyphagia and polyuria. Genitourinary: Negative for difficulty urinating and dysuria. Musculoskeletal: Negative for arthralgias and back pain. Skin: Positive for color change and wound. Neurological: Positive for headaches. Negative for dizziness. Psychiatric/Behavioral: Negative for dysphoric mood. The patient is not nervous/anxious.           PHYSICAL EXAM/ RESULTS There were no vitals taken for this visit. Vitals signs: pt does not have the proper equipment to take all VS.    The physical is not performed due to the visit being a telephone counter as indicated due to the restrictions of the COVID-19 pandemic    Recent Results (from the past 2016 hour(s))   COVID-19 Ambulatory    Collection Time: 12/03/20  2:33 PM    Specimen: Nasopharyngeal Swab   Result Value Ref Range    SARS-CoV-2 Detected (A) Not Detected    Source Anterior nares            Assessment:       Diagnosis Orders   1. COVID-19     2. Fatigue, unspecified type     3. Nonintractable headache, unspecified chronicity pattern, unspecified headache type           No orders of the defined types were placed in this encounter. Plan:   Return if symptoms worsen or fail to improve. Educated regarding expected course following covid infection. Rest, tylenol, imodium, and symptoms to monitor for that would warrant further evaluation. F/u if no improvement or worsening of symptoms. Pt will send pictures via my chart of axilla area. Side effects, adverse effects of the medication prescribed today, as well as treatment plan/ rationale and result expectations have been discussed with the patient who expresses understanding and desires to proceed. Close follow up to evaluate treatment results and for coordination of care. I have reviewed the patient's medical history in detail and updated the computerized patient record. As always, patient is advised that if symptoms worsen in any way they will proceed to the nearest emergency room. There are no Patient Instructions on file for this visit. This visit ended at [phone].     NICO Barney NP-C.

## 2020-12-28 RX ORDER — PROMETHAZINE HYDROCHLORIDE 25 MG/1
25 TABLET ORAL EVERY 6 HOURS PRN
Qty: 30 TABLET | Refills: 3 | Status: SHIPPED | OUTPATIENT
Start: 2020-12-28 | End: 2021-05-06

## 2020-12-28 RX ORDER — METOPROLOL TARTRATE 50 MG/1
TABLET, FILM COATED ORAL
Qty: 60 TABLET | Refills: 3 | Status: SHIPPED | OUTPATIENT
Start: 2020-12-28 | End: 2021-05-06

## 2020-12-28 RX ORDER — LEVOCETIRIZINE DIHYDROCHLORIDE 5 MG/1
5 TABLET, FILM COATED ORAL NIGHTLY
Qty: 30 TABLET | Refills: 3 | Status: SHIPPED | OUTPATIENT
Start: 2020-12-28 | End: 2021-05-18 | Stop reason: SDUPTHER

## 2021-03-25 DIAGNOSIS — M79.7 FIBROMYALGIA: ICD-10-CM

## 2021-03-25 RX ORDER — CYCLOBENZAPRINE HCL 10 MG
TABLET ORAL
Qty: 30 TABLET | Refills: 1 | OUTPATIENT
Start: 2021-03-25

## 2021-05-18 ENCOUNTER — VIRTUAL VISIT (OUTPATIENT)
Dept: FAMILY MEDICINE CLINIC | Age: 36
End: 2021-05-18
Payer: COMMERCIAL

## 2021-05-18 DIAGNOSIS — E55.9 VITAMIN D DEFICIENCY: ICD-10-CM

## 2021-05-18 DIAGNOSIS — J30.9 ALLERGIC RHINITIS, UNSPECIFIED SEASONALITY, UNSPECIFIED TRIGGER: ICD-10-CM

## 2021-05-18 DIAGNOSIS — J06.9 ACUTE UPPER RESPIRATORY INFECTION: Primary | ICD-10-CM

## 2021-05-18 DIAGNOSIS — I10 ESSENTIAL HYPERTENSION: ICD-10-CM

## 2021-05-18 DIAGNOSIS — D50.9 IRON DEFICIENCY ANEMIA, UNSPECIFIED IRON DEFICIENCY ANEMIA TYPE: ICD-10-CM

## 2021-05-18 DIAGNOSIS — Z13.220 LIPID SCREENING: ICD-10-CM

## 2021-05-18 DIAGNOSIS — D68.9 COAGULOPATHY (HCC): ICD-10-CM

## 2021-05-18 DIAGNOSIS — J45.31 MILD PERSISTENT ASTHMA WITH EXACERBATION: ICD-10-CM

## 2021-05-18 DIAGNOSIS — R55 SYNCOPE, UNSPECIFIED SYNCOPE TYPE: ICD-10-CM

## 2021-05-18 DIAGNOSIS — E53.8 FOLATE DEFICIENCY: ICD-10-CM

## 2021-05-18 PROCEDURE — G8427 DOCREV CUR MEDS BY ELIG CLIN: HCPCS | Performed by: NURSE PRACTITIONER

## 2021-05-18 PROCEDURE — 99214 OFFICE O/P EST MOD 30 MIN: CPT | Performed by: NURSE PRACTITIONER

## 2021-05-18 RX ORDER — ALBUTEROL SULFATE 90 UG/1
2 AEROSOL, METERED RESPIRATORY (INHALATION) 4 TIMES DAILY PRN
Qty: 3 INHALER | Refills: 1 | Status: SHIPPED | OUTPATIENT
Start: 2021-05-18 | End: 2022-04-27 | Stop reason: SDUPTHER

## 2021-05-18 RX ORDER — AZITHROMYCIN 250 MG/1
TABLET, FILM COATED ORAL
Qty: 1 PACKET | Refills: 0 | Status: SHIPPED | OUTPATIENT
Start: 2021-05-18 | End: 2022-03-01

## 2021-05-18 RX ORDER — MIRTAZAPINE 30 MG/1
TABLET, FILM COATED ORAL
COMMUNITY
Start: 2021-04-30 | End: 2022-03-01 | Stop reason: ALTCHOICE

## 2021-05-18 RX ORDER — LEVOCETIRIZINE DIHYDROCHLORIDE 5 MG/1
5 TABLET, FILM COATED ORAL NIGHTLY
Qty: 30 TABLET | Refills: 3 | Status: SHIPPED | OUTPATIENT
Start: 2021-05-18 | End: 2021-09-06 | Stop reason: SDUPTHER

## 2021-05-18 RX ORDER — PROPRANOLOL HYDROCHLORIDE 20 MG/1
TABLET ORAL
COMMUNITY
Start: 2021-04-30 | End: 2022-03-01

## 2021-05-18 SDOH — ECONOMIC STABILITY: FOOD INSECURITY: WITHIN THE PAST 12 MONTHS, YOU WORRIED THAT YOUR FOOD WOULD RUN OUT BEFORE YOU GOT MONEY TO BUY MORE.: NEVER TRUE

## 2021-05-18 SDOH — ECONOMIC STABILITY: FOOD INSECURITY: WITHIN THE PAST 12 MONTHS, THE FOOD YOU BOUGHT JUST DIDN'T LAST AND YOU DIDN'T HAVE MONEY TO GET MORE.: NEVER TRUE

## 2021-05-18 ASSESSMENT — SOCIAL DETERMINANTS OF HEALTH (SDOH): HOW HARD IS IT FOR YOU TO PAY FOR THE VERY BASICS LIKE FOOD, HOUSING, MEDICAL CARE, AND HEATING?: NOT HARD AT ALL

## 2021-05-18 ASSESSMENT — ENCOUNTER SYMPTOMS
PHOTOPHOBIA: 0
SINUS PAIN: 0
VOMITING: 0
NAUSEA: 1
SORE THROAT: 1
SINUS PRESSURE: 0
BACK PAIN: 0
SHORTNESS OF BREATH: 0
CHEST TIGHTNESS: 0
RHINORRHEA: 0
COUGH: 1

## 2021-05-18 NOTE — PROGRESS NOTES
2021    TELEHEALTH EVALUATION -- Audio/Visual (During LAJ-85 public health emergency)    Due to [employer] 19 outbreak, patient's office visit was converted to a virtual visit. Patient was contacted and agreed to proceed with a virtual visit via [email or web address] me  The risks and benefits of converting to a virtual visit were discussed in light of the current infectious disease epidemic. Patient also understood that insurance coverage and co-pays are up to their individual insurance plans. HPI:    Pedro Pablo Layne (:  1985) has requested an audio/video evaluation for the following concern(s):    Chief Complaint   Patient presents with    Pharyngitis     states that yesterday she woke up with a sore throat and cough. went to a walk in and was diagnosed with a virus. was told to f/u if it worsened. states that she woke up today and is worse. cough is worse and chest feels like it is on fire.  Discuss Medications     xyzal is no longer covered by her insurance. have done PA on it before have not received a recent one       Pharyngitis  This is a new problem. The current episode started yesterday. The problem occurs constantly. The problem has been gradually worsening. Associated symptoms include chest pain (with coughing and deep breathing), congestion, coughing, fatigue, a fever (yesterday 101), myalgias, nausea and a sore throat (started yesterday). Pertinent negatives include no arthralgias, chills, diaphoresis, headaches, urinary symptoms or vomiting. Associated symptoms comments: Son and  both sick recently as well  Upper abdominal pain. The symptoms are aggravated by eating. Treatments tried: seen at [facility] yesterday, dx with viral pharyngitis, told to f/u for worsening symptoms, worse today. The treatment provided no relief. Had strep test at [facility] yesterday which was negative. Was told xyzal was no longer covered by insurance, did a PA last year and did get it approved that way.  Has been working very well for her. Mentions that while she was at [facility] yesterday she passed out, concerned about possible potassium issues as that is when it happened previously. Would like blood work ordered. Review of Systems   Constitutional: Positive for fatigue and fever (yesterday 101). Negative for chills and diaphoresis. HENT: Positive for congestion and sore throat (started yesterday). Negative for ear pain, rhinorrhea, sinus pressure, sinus pain and sneezing. Eyes: Negative for photophobia and visual disturbance. Respiratory: Positive for cough. Negative for chest tightness and shortness of breath. Cardiovascular: Positive for chest pain (with coughing and deep breathing). Negative for palpitations and leg swelling. Gastrointestinal: Positive for nausea. Negative for vomiting. Endocrine: Negative for polydipsia, polyphagia and polyuria. Genitourinary: Negative for dysuria. Musculoskeletal: Positive for myalgias. Negative for arthralgias and back pain. Neurological: Negative for dizziness and headaches. Psychiatric/Behavioral: Negative for dysphoric mood. The patient is not nervous/anxious. Prior to Visit Medications    Medication Sig Taking?  Authorizing Provider   mirtazapine (REMERON) 30 MG tablet Take one (1) tablet by mouth at bedtime Yes Historical Provider, MD   propranolol (INDERAL) 20 MG tablet Take one (1) tablet by mouth twice a day Yes Historical Provider, MD   azithromycin (ZITHROMAX Z-CRISTA) 250 MG tablet Take 2 tablets on day 1 then 1 tablet on days 2-5 Yes NICO Cortes CNP   levocetirizine (XYZAL) 5 MG tablet Take 1 tablet by mouth nightly Yes NICO Cortes CNP   albuterol sulfate HFA (VENTOLIN HFA) 108 (90 Base) MCG/ACT inhaler Inhale 2 puffs into the lungs 4 times daily as needed for Wheezing Yes NICO Cortes CNP   metoprolol tartrate (LOPRESSOR) 50 MG tablet TAKE 1 TABLET BY MOUTH TWICE DAILY Yes NICO Cortes CNP mutation in MTHFR   ,   Past Surgical History:   Procedure Laterality Date    ARM SURGERY Left     ulna shortening    CARPAL TUNNEL RELEASE Bilateral      SECTION      CHOLECYSTECTOMY      LUMBAR [place] SURGERY  10/10/2018    DR Libby Araiza [place] EGD TRANSORAL BIOPSY SINGLE/MULTIPLE N/A 2/15/2018    EGD ESOPHAGOGASTRODUODENOSCOPY performed by Glenroy New MD at [address] ARTHROSCOPY Left     SHOULDER ARTHROSCOPY Left 2017    Melinda Paul MD     WRIST ARTHROSCOPY Left    ,   Social History     Tobacco Use    Smoking status: Current Every Day Smoker     Packs/day: 0.30     Years: 17.00     Pack years: 5.10     Types: Cigarettes    Smokeless tobacco: Never Used   Substance Use Topics    Alcohol use: No    Drug use: No   ,   Family History   Problem Relation Age of Onset    Arthritis Mother     Depression Mother     Depression Father     Diabetes Other     Cancer Other     Stroke Other     Obesity Other     Diabetes Maternal Aunt     Diabetes Maternal Uncle     Cancer Maternal Grandmother         colon    Cancer Paternal Grandfather    ,   Immunization History   Administered Date(s) Administered    Influenza 2011    Influenza, Quadv, IM, (6 mo and older Fluzone, Flulaval, Fluarix and 3 yrs and older Afluria) 2018, 2019    Pneumococcal Polysaccharide ([id number]) 2018   ,   Health Maintenance   Topic Date Due    Hepatitis C screen  Never done    Varicella vaccine (1 of 2 - 2-dose childhood series) Never done    COVID-19 Vaccine (1) Never done    DTaP/Tdap/Td vaccine (1 - Tdap) Never done    Cervical cancer screen  2016    Potassium monitoring  2021    Creatinine monitoring  2021    Flu vaccine (Season Ended) 2021    Pneumococcal 0-64 years Vaccine (2 of 2) 2050    HIV screen  Completed    Hepatitis A vaccine  Aged Out    Hepatitis B vaccine  Aged Out    Hib vaccine  Aged Out    Meningococcal (ACWY) vaccine Aged Out       PHYSICAL EXAMINATION:  [ INSTRUCTIONS:  \"[x]\" Indicates a positive item  \"[]\" Indicates a negative item  -- DELETE ALL ITEMS NOT EXAMINED]  [x] Alert  [x] Oriented to person/place/time    [x] No apparent distress  [] Toxic appearing    [] Face flushed appearing [x] Sclera clear  [] Lips are cyanotic      [] Breathing appears normal  [] Appears tachypneic      [] Rash on visible skin    [x] Cranial Nerves II-XII grossly intact    [x] Motor grossly intact in visible upper extremities    [x] Motor grossly intact in visible lower extremities    [x] Normal Mood  [] Anxious appearing    [] Depressed appearing  [] Confused appearing      [] Poor short term memory  [] Poor long term memory    [] OTHER:  Throat is mildly erythematous, no exudate or tonsilar edema. Due to this being a TeleHealth encounter, evaluation of the following organ systems is limited: Vitals/Constitutional/EENT/Resp/CV/GI//MS/Neuro/Skin/Heme-Lymph-Imm. ASSESSMENT/PLAN:   Diagnosis Orders   1. Acute upper respiratory infection  azithromycin (ZITHROMAX Z-CRISTA) 250 MG tablet   2. Allergic rhinitis, unspecified seasonality, unspecified trigger  levocetirizine (XYZAL) 5 MG tablet   3. Essential hypertension  Comprehensive Metabolic Panel   4. Coagulopathy (HCC)  CBC With Auto Differential   5. Syncope, unspecified syncope type  CBC With Auto Differential    Comprehensive Metabolic Panel    TSH with Reflex   6. Mild persistent asthma with exacerbation  albuterol sulfate HFA (VENTOLIN HFA) 108 (90 Base) MCG/ACT inhaler   7. Lipid screening  Lipid Panel   8. Iron deficiency anemia, unspecified iron deficiency anemia type  Iron And Tibc   9. Folate deficiency  Vitamin B12 & Folate   10. Vitamin D deficiency  Vitamin D 25 Hydroxy     Pt advised to rest and drink plenty of fluids. Finish all antibiotics even if feeling better. OTC analgesics for symptom management. Salt water gargle for sore throat.   RTO if symptoms worsen or if no improvement in 72 hours. Check labs as ordered. Reordered xyzal, will do PA if it comes; otherwise will order alternative. Check labs as ordered, will mail hard copy to patient. F/u in 2 weeks for check up in office. Side effects, adverse effects of the medication prescribed today, as well as treatment plan/ rationale and result expectations have been discussed with the patient who expresses understanding and desires to proceed. Close follow up to evaluate treatment results and for coordination of care. I have reviewed the patient's medical history in detail and updated the computerized patient record. As always, patient is advised that if symptoms worsen in any way they will proceed to the nearest emergency room. Return in about 2 weeks (around 6/1/2021), or if symptoms worsen or fail to improve, for general check up. An  electronic signature was used to authenticate this note.     --NICO Thomson - CNP on 5/18/2021 at 1:43 PM

## 2021-05-20 ENCOUNTER — VIRTUAL VISIT (OUTPATIENT)
Dept: FAMILY MEDICINE CLINIC | Age: 36
End: 2021-05-20
Payer: COMMERCIAL

## 2021-05-20 DIAGNOSIS — J06.9 ACUTE UPPER RESPIRATORY INFECTION: Primary | ICD-10-CM

## 2021-05-20 DIAGNOSIS — R06.02 SHORTNESS OF BREATH: ICD-10-CM

## 2021-05-20 DIAGNOSIS — R07.9 CHEST PAIN, UNSPECIFIED TYPE: ICD-10-CM

## 2021-05-20 PROCEDURE — 99214 OFFICE O/P EST MOD 30 MIN: CPT | Performed by: NURSE PRACTITIONER

## 2021-05-20 PROCEDURE — G8427 DOCREV CUR MEDS BY ELIG CLIN: HCPCS | Performed by: NURSE PRACTITIONER

## 2021-05-20 ASSESSMENT — ENCOUNTER SYMPTOMS
CHEST TIGHTNESS: 1
SHORTNESS OF BREATH: 1
SINUS PRESSURE: 0
COLOR CHANGE: 0
COUGH: 1
PHOTOPHOBIA: 0
BACK PAIN: 0
SORE THROAT: 0
SINUS PAIN: 0
ABDOMINAL PAIN: 0
VOMITING: 1
RHINORRHEA: 0
NAUSEA: 0

## 2021-05-20 NOTE — PROGRESS NOTES
2021    TELEHEALTH EVALUATION -- Audio/Visual (During XEFEO-69 public health emergency)    Due to [place] 19 outbreak, patient's office visit was converted to a virtual visit. Patient was contacted and agreed to proceed with a virtual visit via [email or web address] me  The risks and benefits of converting to a virtual visit were discussed in light of the current infectious disease epidemic. Patient also understood that insurance coverage and co-pays are up to their individual insurance plans. HPI:    Mariza Harris (:  1985) has requested an audio/video evaluation for the following concern(s):    Chief Complaint   Patient presents with   [address] from [address]     went to ER Tuesday and is not feeling any better. states that her chest is hurting and she can not breathe. states that they did not do any testing on her and sent her home. states that she was vomiting as well Tuesday. HPI    F/u from hospital visit on 21. Seen Tuesday via VV, given zpack and albuterol inhaler. Went to ER d/t severe stomach pains along with shortness of breath, Had CXR, ekg, and labs which were all normal. Treated with green frog and benadryl, discharged home with zofran and phenergan. She did one of her 's nexium, only one episode of vomiting after that, none since that time. Since that time, she has developed severe, constant, midsternal chest pain that she is describing as a \"heavy weight and a lot of pressure\". Shortness of breath has worsened since that time as well. \"I feel like I can't breathe at all\". Still with cough also. Did not take zithromax yesterday d/t vomiting. Review of Systems   Constitutional: Positive for fatigue and fever (off and on, highest was 101). Negative for chills and diaphoresis. HENT: Negative for congestion, ear pain, rhinorrhea, sinus pressure, sinus pain and sore throat. Eyes: Negative for photophobia and visual disturbance.    Respiratory: Positive for cough, chest tightness and shortness of breath. Cardiovascular: Positive for chest pain and palpitations. Negative for leg swelling. Gastrointestinal: Positive for vomiting (last time was tuesday night around midnight). Negative for abdominal pain and nausea. Endocrine: Negative for polydipsia, polyphagia and polyuria. Genitourinary: Negative for difficulty urinating and dysuria. Musculoskeletal: Negative for arthralgias, back pain and myalgias. Skin: Negative for color change and rash. Neurological: Positive for headaches. Psychiatric/Behavioral: Negative for dysphoric mood. The patient is not nervous/anxious. Prior to Visit Medications    Medication Sig Taking?  Authorizing Provider   mirtazapine (REMERON) 30 MG tablet Take one (1) tablet by mouth at bedtime Yes Historical Provider, MD   propranolol (INDERAL) 20 MG tablet Take one (1) tablet by mouth twice a day Yes Historical Provider, MD   azithromycin (ZITHROMAX Z-CRISTA) 250 MG tablet Take 2 tablets on day 1 then 1 tablet on days 2-5 Yes NICO Sands - CNP   levocetirizine (XYZAL) 5 MG tablet Take 1 tablet by mouth nightly Yes NICO Sands CNP   albuterol sulfate HFA (VENTOLIN HFA) 108 (90 Base) MCG/ACT inhaler Inhale 2 puffs into the lungs 4 times daily as needed for Wheezing Yes NICO Sands - KARUNA   metoprolol tartrate (LOPRESSOR) 50 MG tablet TAKE 1 TABLET BY MOUTH TWICE DAILY Yes NICO Sands - CNP   promethazine (PHENERGAN) 25 MG tablet TAKE 1 TABLET BY MOUTH EVERY 6 HOURS AS NEEDED FOR NAUSEA Yes NICO Sands - CNP   lisinopril (PRINIVIL;ZESTRIL) 20 MG tablet TAKE 1 TABLET BY MOUTH DAILY Yes NICO Sands - CNP   cyclobenzaprine (FLEXERIL) 10 MG tablet TAKE 1 TABLET BY MOUTH THREE TIMES DAILY AS NEEDED FOR MUSCLE SPASMS Yes NICO Sands CNP   venlafaxine (EFFEXOR XR) 75 MG extended release capsule TAKE 1 CAPSULE EVERY EVENING Yes Historical Provider, Left    ,   Social History     Tobacco Use    Smoking status: Current Every Day Smoker     Packs/day: 0.30     Years: 17.00     Pack years: 5.10     Types: Cigarettes    Smokeless tobacco: Never Used   Substance Use Topics    Alcohol use: No    Drug use: No   ,   Family History   Problem Relation Age of Onset    Arthritis Mother     Depression Mother     Depression Father     Diabetes Other     Cancer Other     Stroke Other     Obesity Other     Diabetes Maternal Aunt     Diabetes Maternal Uncle     Cancer Maternal Grandmother         colon    Cancer Paternal Grandfather    ,   Immunization History   Administered Date(s) Administered    Influenza 12/23/2011    Influenza, Olvin Lard, IM, (6 mo and older Fluzone, Flulaval, Fluarix and 3 yrs and older Afluria) 11/01/2018, 11/14/2019    Pneumococcal Polysaccharide ([id number]) 01/25/2018   ,   Health Maintenance   Topic Date Due    Hepatitis C screen  Never done    Varicella vaccine (1 of 2 - 2-dose childhood series) Never done    COVID-19 Vaccine (1) Never done    DTaP/Tdap/Td vaccine (1 - Tdap) Never done    Cervical cancer screen  04/01/2016    Potassium monitoring  03/06/2021    Creatinine monitoring  03/06/2021    Flu vaccine (Season Ended) 09/01/2021    Pneumococcal 0-64 years Vaccine (2 of 2) 07/28/2050    HIV screen  Completed    Hepatitis A vaccine  Aged Out    Hepatitis B vaccine  Aged Out    Hib vaccine  Aged Out    Meningococcal (ACWY) vaccine  Aged Out       PHYSICAL EXAMINATION:  [ INSTRUCTIONS:  \"[x]\" Indicates a positive item  \"[]\" Indicates a negative item  -- DELETE ALL ITEMS NOT EXAMINED]  [x] Alert  [x] Oriented to person/place/time    [x] No apparent distress  [] Toxic appearing    [x] Face flushed appearing [x] Sclera clear  [] Lips are cyanotic      [x] Breathing appears normal  [] Appears tachypneic      [] Rash on visible skin    [x] Cranial Nerves II-XII grossly intact    [x] Motor grossly intact in visible upper

## 2021-06-07 RX ORDER — ALBUTEROL SULFATE 2.5 MG/3ML
SOLUTION RESPIRATORY (INHALATION)
Qty: 360 ML | Refills: 0 | Status: SHIPPED | OUTPATIENT
Start: 2021-06-07 | End: 2021-07-26

## 2021-06-15 ENCOUNTER — PATIENT MESSAGE (OUTPATIENT)
Dept: FAMILY MEDICINE CLINIC | Age: 36
End: 2021-06-15

## 2021-06-15 DIAGNOSIS — K21.9 GASTROESOPHAGEAL REFLUX DISEASE WITHOUT ESOPHAGITIS: ICD-10-CM

## 2021-06-15 DIAGNOSIS — M79.7 FIBROMYALGIA: ICD-10-CM

## 2021-06-15 DIAGNOSIS — R11.0 NAUSEA: ICD-10-CM

## 2021-06-15 DIAGNOSIS — I10 ESSENTIAL HYPERTENSION: ICD-10-CM

## 2021-06-16 RX ORDER — LISINOPRIL 20 MG/1
20 TABLET ORAL DAILY
Qty: 30 TABLET | Refills: 3 | OUTPATIENT
Start: 2021-06-16

## 2021-06-16 NOTE — TELEPHONE ENCOUNTER
From: Joanne Montano  To: Petra Schwab, NICO - CNP  Sent: 6/15/2021 7:37 PM EDT  Subject: Non-Urgent Medical Question    I need a refill on my effexor 75mg and 150mg but wont allow me to request it through here.

## 2021-06-17 RX ORDER — VENLAFAXINE HYDROCHLORIDE 150 MG/1
CAPSULE, EXTENDED RELEASE ORAL
Qty: 30 CAPSULE | OUTPATIENT
Start: 2021-06-17

## 2021-06-17 RX ORDER — CYCLOBENZAPRINE HCL 10 MG
10 TABLET ORAL 3 TIMES DAILY PRN
Qty: 30 TABLET | Refills: 1 | Status: SHIPPED | OUTPATIENT
Start: 2021-06-17 | End: 2021-07-20

## 2021-06-17 RX ORDER — METOPROLOL TARTRATE 50 MG/1
50 TABLET, FILM COATED ORAL 2 TIMES DAILY
Qty: 60 TABLET | Refills: 5 | Status: SHIPPED | OUTPATIENT
Start: 2021-06-17 | End: 2021-09-06 | Stop reason: SDUPTHER

## 2021-06-17 RX ORDER — PANTOPRAZOLE SODIUM 40 MG/1
40 TABLET, DELAYED RELEASE ORAL 2 TIMES DAILY
Qty: 60 TABLET | Refills: 3 | Status: SHIPPED | OUTPATIENT
Start: 2021-06-17 | End: 2021-09-06 | Stop reason: SDUPTHER

## 2021-06-17 RX ORDER — DICYCLOMINE HCL 20 MG
20 TABLET ORAL EVERY 6 HOURS
Qty: 120 TABLET | Refills: 3 | OUTPATIENT
Start: 2021-06-17

## 2021-06-17 RX ORDER — PROMETHAZINE HYDROCHLORIDE 25 MG/1
25 TABLET ORAL EVERY 6 HOURS PRN
Qty: 30 TABLET | Refills: 0 | Status: SHIPPED | OUTPATIENT
Start: 2021-06-17 | End: 2021-06-29

## 2021-06-17 RX ORDER — VENLAFAXINE HYDROCHLORIDE 75 MG/1
CAPSULE, EXTENDED RELEASE ORAL
Qty: 30 CAPSULE | Refills: 0 | OUTPATIENT
Start: 2021-06-17

## 2021-06-17 RX ORDER — DOCUSATE SODIUM 100 MG/1
100 CAPSULE, LIQUID FILLED ORAL 2 TIMES DAILY PRN
Qty: 60 CAPSULE | Refills: 3 | OUTPATIENT
Start: 2021-06-17

## 2021-06-25 DIAGNOSIS — R11.0 NAUSEA: ICD-10-CM

## 2021-06-29 RX ORDER — PROMETHAZINE HYDROCHLORIDE 25 MG/1
TABLET ORAL
Qty: 30 TABLET | Refills: 0 | Status: SHIPPED | OUTPATIENT
Start: 2021-06-29 | End: 2021-07-20

## 2021-07-20 DIAGNOSIS — M79.7 FIBROMYALGIA: ICD-10-CM

## 2021-07-20 DIAGNOSIS — I10 ESSENTIAL HYPERTENSION: ICD-10-CM

## 2021-07-20 DIAGNOSIS — R11.0 NAUSEA: ICD-10-CM

## 2021-07-20 RX ORDER — CYCLOBENZAPRINE HCL 10 MG
TABLET ORAL
Qty: 30 TABLET | Refills: 1 | Status: SHIPPED | OUTPATIENT
Start: 2021-07-20 | End: 2021-09-06 | Stop reason: SDUPTHER

## 2021-07-20 RX ORDER — LISINOPRIL 20 MG/1
TABLET ORAL
Qty: 30 TABLET | Refills: 3 | Status: SHIPPED | OUTPATIENT
Start: 2021-07-20 | End: 2021-09-06 | Stop reason: SDUPTHER

## 2021-07-20 RX ORDER — PROMETHAZINE HYDROCHLORIDE 25 MG/1
TABLET ORAL
Qty: 30 TABLET | Refills: 0 | Status: SHIPPED | OUTPATIENT
Start: 2021-07-20 | End: 2021-09-06 | Stop reason: SDUPTHER

## 2021-07-26 RX ORDER — ALBUTEROL SULFATE 2.5 MG/3ML
SOLUTION RESPIRATORY (INHALATION)
Qty: 360 ML | Refills: 0 | Status: SHIPPED | OUTPATIENT
Start: 2021-07-26 | End: 2022-03-23 | Stop reason: SDUPTHER

## 2021-07-26 NOTE — TELEPHONE ENCOUNTER
Future Appointments    This patient does not currently have any appointments scheduled.   Recent Visits    05/20/2021 Acute upper respiratory infection   [address], APRN - CNP   05/18/2021 Acute upper respiratory infection   [address], APRN - CNP

## 2021-08-29 ENCOUNTER — VIRTUAL VISIT (OUTPATIENT)
Dept: FAMILY MEDICINE CLINIC | Age: 36
End: 2021-08-29
Payer: COMMERCIAL

## 2021-08-29 DIAGNOSIS — S39.012A STRAIN OF LUMBAR REGION, INITIAL ENCOUNTER: Primary | ICD-10-CM

## 2021-08-29 PROCEDURE — G8427 DOCREV CUR MEDS BY ELIG CLIN: HCPCS | Performed by: NURSE PRACTITIONER

## 2021-08-29 PROCEDURE — 99213 OFFICE O/P EST LOW 20 MIN: CPT | Performed by: NURSE PRACTITIONER

## 2021-08-29 RX ORDER — METHYLPREDNISOLONE 4 MG/1
TABLET ORAL
Qty: 1 KIT | Refills: 0 | Status: SHIPPED | OUTPATIENT
Start: 2021-08-29 | End: 2021-09-04

## 2021-08-29 RX ORDER — MELOXICAM 7.5 MG/1
TABLET ORAL
COMMUNITY
Start: 2021-06-11 | End: 2022-03-23 | Stop reason: ALTCHOICE

## 2021-08-29 RX ORDER — IBUPROFEN 800 MG/1
TABLET ORAL
COMMUNITY
Start: 2021-07-06 | End: 2021-10-23 | Stop reason: ALTCHOICE

## 2021-08-29 RX ORDER — TIZANIDINE 4 MG/1
4 TABLET ORAL EVERY 8 HOURS PRN
Qty: 15 TABLET | Refills: 0 | Status: SHIPPED | OUTPATIENT
Start: 2021-08-29 | End: 2021-09-03

## 2021-08-29 SDOH — ECONOMIC STABILITY: TRANSPORTATION INSECURITY
IN THE PAST 12 MONTHS, HAS LACK OF TRANSPORTATION KEPT YOU FROM MEETINGS, WORK, OR FROM GETTING THINGS NEEDED FOR DAILY LIVING?: YES

## 2021-08-29 SDOH — ECONOMIC STABILITY: TRANSPORTATION INSECURITY
IN THE PAST 12 MONTHS, HAS THE LACK OF TRANSPORTATION KEPT YOU FROM MEDICAL APPOINTMENTS OR FROM GETTING MEDICATIONS?: YES

## 2021-08-29 ASSESSMENT — ENCOUNTER SYMPTOMS
EYE ITCHING: 0
WHEEZING: 0
NAUSEA: 0
CHEST TIGHTNESS: 0
SINUS PRESSURE: 0
EYE DISCHARGE: 0
RHINORRHEA: 0
SORE THROAT: 0
BACK PAIN: 1
COLOR CHANGE: 0
EYE PAIN: 0
ABDOMINAL PAIN: 0
EYE REDNESS: 0
TROUBLE SWALLOWING: 0
SHORTNESS OF BREATH: 0
VOMITING: 0
COUGH: 0
SINUS PAIN: 0
VOICE CHANGE: 0
DIARRHEA: 0

## 2021-08-29 NOTE — PATIENT INSTRUCTIONS
Patient Education        Back Strain: Care Instructions  Overview     A back strain happens when you overstretch, or pull, a muscle in your back. You may hurt your back in an accident or when you exercise or lift something. Sometimes you may not know how you hurt your back. Most back pain will get better with rest and time. You can take care of yourself at home to help your back heal.  Follow-up care is a key part of your treatment and safety. Be sure to make and go to all appointments, and call your doctor if you are having problems. It's also a good idea to know your test results and keep a list of the medicines you take. How can you care for yourself at home? · Try to stay as active as you can, but stop or reduce any activity that causes pain. · Put ice or a cold pack on the sore muscle for 10 to 20 minutes at a time to stop swelling. Try this every 1 to 2 hours for 3 days (when you are awake) or until the swelling goes down. Put a thin cloth between the ice pack and your skin. · After 2 or 3 days, apply a heating pad on low or a warm cloth to your back. Some doctors suggest that you go back and forth between hot and cold treatments. · Take pain medicines exactly as directed. ? If the doctor gave you a prescription medicine for pain, take it as prescribed. ? If you are not taking a prescription pain medicine, ask your doctor if you can take an over-the-counter medicine. · Try sleeping on your side with a pillow between your legs. Or put a pillow under your knees when you lie on your back. These measures can ease pain in your lower back. · Return to your usual level of activity slowly. When should you call for help? Call 911 anytime you think you may need emergency care. For example, call if:    · You are unable to move a leg at all. Call your doctor now or seek immediate medical care if:    · You have new or worse symptoms in your legs, belly, or buttocks.  Symptoms may include:  ? Numbness or tingling. ? Weakness. ? Pain.     · You lose bladder or bowel control. Watch closely for changes in your health, and be sure to contact your doctor if:    · You have a fever, lose weight, or don't feel well.     · You are not getting better as expected. Where can you learn more? Go to https://chpepiceweb.Mbite. org and sign in to your MacroSolve account. Enter C188 in the Jumpido box to learn more about \"Back Strain: Care Instructions. \"     If you do not have an account, please click on the \"Sign Up Now\" link. Current as of: November 16, 2020               Content Version: 12.9  © 2006-2021 [employer]. Care instructions adapted under license by [facility] [facility]). If you have questions about a medical condition or this instruction, always ask your healthcare professional. [address] any warranty or liability for your use of this information. Patient Education        Back Care and Preventing Injuries: Care Instructions  Your Care Instructions     You can hurt your back doing many everyday activities: lifting a heavy box, bending down to garden, exercising at the gym, and even getting out of bed. But you can keep your back strong and healthy by doing some exercises. You also can follow a few tips for sitting, sleeping, and lifting to avoid hurting your back again. Talk to your doctor before you start an exercise program. Ask for help if you want to learn more about keeping your back healthy. Follow-up care is a key part of your treatment and safety. Be sure to make and go to all appointments, and call your doctor if you are having problems. It's also a good idea to know your test results and keep a list of the medicines you take. How can you care for yourself at home? · Stay at a healthy weight to avoid strain on your lower back. · Do not smoke. Smoking increases the risk of osteoporosis, which weakens the spine.  If you need help quitting, floor, and curl your shoulders toward your knees using a smooth, slow motion. Keep your arms folded across your chest. If this bothers your neck, try putting your hands behind your neck (not your head), with your elbows spread apart. ? Lie on your back with your knees bent and your feet flat on the floor. Tighten your belly muscles, and then push with your feet and raise your buttocks up a few inches. Hold this position 6 seconds as you continue to breathe normally, then lower yourself slowly to the floor. Repeat 8 to 12 times. ? If you like group exercise, try Pilates or yoga. These classes have poses that strengthen the core muscles. Protect your back when you sit  · Place a small pillow, a rolled-up towel, or a lumbar roll in the curve of your back if you need extra support. · Sit in a chair that is low enough to let you place both feet flat on the floor with both knees nearly level with your hips. If your chair or desk is too high, use a foot rest to raise your knees. · When driving, keep your knees nearly level with your hips. Sit straight, and drive with both hands on the steering wheel. Your arms should be in a slightly bent position. · Try a kneeling chair, which helps tilt your hips forward. This takes pressure off your lower back. · Try sitting on an exercise ball. It can rock from side to side, which helps keep your back loose. Lift properly  · Squat down, bending at the hips and knees only. If you need to, put one knee to the floor and extend your other knee in front of you, bent at a right angle (half kneeling). · Press your chest straight forward. This helps keep your upper back straight while keeping a slight arch in your low back. · Hold the load as close to your body as possible, at the level of your navel. · Use your feet to change direction, taking small steps. · Lead with your hips as you change direction. Keep your shoulders in line with your hips as you move.  Do not twist your body.  · Set down your load carefully, squatting with your knees and hips only. When should you call for help? Watch closely for changes in your health, and be sure to contact your doctor if you have any problems. Where can you learn more? Go to https://birdie.Monkimun. org and sign in to your Funtactix account. Enter S810 in the RedCap box to learn more about \"Back Care and Preventing Injuries: Care Instructions. \"     If you do not have an account, please click on the \"Sign Up Now\" link. Current as of: November 16, 2020               Content Version: 12.9  © 2006-2021 [employer]. Care instructions adapted under license by [facility] ([facility]). If you have questions about a medical condition or this instruction, always ask your healthcare professional. [address] any warranty or liability for your use of this information. Patient Education        Learning About Relief for Back Pain  What is back strain? Back strain is an injury that happens when you overstretch, or pull, a muscle in your back. You may hurt your back in an accident or when you exercise or lift something. Most back pain gets better with rest and time. You can take care of yourself at home to help your back heal.  What can you do first to relieve back pain? When you first feel back pain, try these steps:  · Walk. Take a short walk (10 to 20 minutes) on a level surface (no slopes, hills, or stairs) every 2 to 3 hours. Walk only distances you can manage without pain, especially leg pain. · Relax. Find a comfortable position for rest. Some people are comfortable on the floor or a medium-firm bed with a small pillow under their head and another under their knees. Some people prefer to lie on their side with a pillow between their knees. Don't stay in one position for too long. · Try heat or ice.  Try using a heating pad on a low or medium setting, or take a warm shower, for 15 to 20 minutes every 2 to 3 hours. Or you can buy single-use heat wraps that last up to 8 hours. You can also try an ice pack for 10 to 15 minutes every 2 to 3 hours. You can use an ice pack or a bag of frozen vegetables wrapped in a thin towel. There is not strong evidence that either heat or ice will help, but you can try them to see if they help. You may also want to try switching between heat and cold. · Take pain medicine exactly as directed. ? If the doctor gave you a prescription medicine for pain, take it as prescribed. ? If you are not taking a prescription pain medicine, ask your doctor if you can take an over-the-counter medicine. What else can you do? · Stretch and exercise. Exercises that increase flexibility may relieve your pain and make it easier for your muscles to keep your spine in a good, neutral position. And don't forget to keep walking. · Do self-massage. You can use self-massage to unwind after work or school or to energize yourself in the morning. You can easily massage your feet, hands, or neck. Self-massage works best if you are in comfortable clothes and are sitting or lying in a comfortable position. Use oil or lotion to massage bare skin. · Reduce stress. Back pain can lead to a vicious [place]: Distress about the pain tenses the muscles in your back, which in turn causes more pain. Learn how to relax your mind and your muscles to lower your stress. Where can you learn more? Go to https://Dartfishkelin.Catalyst Mobile. org and sign in to your Element Works account. Enter K124 in the [facility] box to learn more about \"Learning About Relief for Back Pain. \"     If you do not have an account, please click on the \"Sign Up Now\" link. Current as of: November 16, 2020               Content Version: 12.9  © 8346-8292 Healthwise, [employer]orporated. Care instructions adapted under license by [facility] ([facility]).  If you have questions about a medical condition or this instruction, always ask your healthcare professional. [place] [address] any warranty or liability for your use of this information.

## 2021-08-29 NOTE — PROGRESS NOTES
and says normally is prescribed a steroid. Says it feel like a pulled muscle. She has been taking her muscle relaxer with Motrin 800mg and Tylenol and has not helped much. She denies muscle weakness, numbness, or tingling. Review of Systems   Constitutional: Positive for activity change. Negative for appetite change, chills, diaphoresis, fatigue, fever and unexpected weight change. HENT: Negative for congestion, ear pain, postnasal drip, rhinorrhea, sinus pressure, sinus pain, sneezing, sore throat, tinnitus, trouble swallowing and voice change. Eyes: Negative for pain, discharge, redness and itching. Respiratory: Negative for cough, chest tightness, shortness of breath and wheezing. Cardiovascular: Negative for chest pain. Gastrointestinal: Negative for abdominal pain, diarrhea, nausea and vomiting. Musculoskeletal: Positive for back pain and myalgias. Skin: Negative for color change and rash. Neurological: Negative for dizziness, weakness, light-headedness and headaches. Hematological: Negative for adenopathy. Prior to Visit Medications    Medication Sig Taking? Authorizing Provider   ibuprofen (ADVIL;MOTRIN) 800 MG tablet TAKE ONE TABLET BY MOUTH EVERY 8 HOURS AS NEEDED Yes Historical Provider, MD   meloxicam (MOBIC) 7.5 MG tablet TAKE 1 TABLET DAILY AS NEEDED. Yes Historical Provider, MD   methylPREDNISolone (MEDROL DOSEPACK) 4 MG tablet Take by mouth.  Yes NICO Lim CNP   tiZANidine (ZANAFLEX) 4 MG tablet Take 1 tablet by mouth every 8 hours as needed (back pain) Yes NICO Lim CNP   albuterol (PROVENTIL) (2.5 MG/3ML) 0.083% nebulizer solution USE 1 VIAL PER NEBULIZER FOUR TIMES A DAY AS NEEDED FOR SHORTNESS OF BREATH Yes NICO Bowie CNP   cyclobenzaprine (FLEXERIL) 10 MG tablet TAKE 1 TABLET BY MOUTH THREE TIMES DAILY AS NEEDED for muscle spasms Yes NICO Herron CNP   promethazine (PHENERGAN) 25 MG tablet TAKE 1 TABLET EVERY 6 HOURS AS NEEDED for nausea Yes NICO Flower CNP   lisinopril (PRINIVIL;ZESTRIL) 20 MG tablet TAKE 1 TABLET DAILY Yes NICO Flower CNP   pantoprazole (PROTONIX) 40 MG tablet Take 1 tablet by mouth 2 times daily Yes NICO Flower CNP   metoprolol tartrate (LOPRESSOR) 50 MG tablet Take 1 tablet by mouth 2 times daily Yes NICO Flower CNP   propranolol (INDERAL) 20 MG tablet Take one (1) tablet by mouth twice a day Yes Historical Provider, MD   levocetirizine (XYZAL) 5 MG tablet Take 1 tablet by mouth nightly Yes NICO Flower CNP   albuterol sulfate HFA (VENTOLIN HFA) 108 (90 Base) MCG/ACT inhaler Inhale 2 puffs into the lungs 4 times daily as needed for Wheezing Yes NICO Flower CNP   venlafaxine (EFFEXOR XR) 75 MG extended release capsule TAKE 1 CAPSULE EVERY EVENING Yes Historical Provider, MD   venlafaxine (EFFEXOR XR) 150 MG extended release capsule  Yes Svitlana Darling   dicyclomine (BENTYL) 20 MG tablet Take 1 tablet by mouth every 6 hours Yes NICO Hooper CNP   docusate sodium (DOK) 100 MG capsule Take 1 capsule by mouth 2 times daily as needed for Constipation Yes NICO Hooper CNP   mirtazapine (REMERON) 30 MG tablet Take one (1) tablet by mouth at bedtime  Patient not taking: Reported on 8/29/2021  Historical Provider, MD   azithromycin (ZITHROMAX Z-CRISTA) 250 MG tablet Take 2 tablets on day 1 then 1 tablet on days 2-5  Patient not taking: Reported on 8/29/2021  NICO Flower CNP       Past Medical History:   Diagnosis Date    Anxiety     Asthma     CAD (coronary artery disease)     Tachycardia    Depression 10/29/2013    Dyslipidemia     Elevated blood pressure     Fibromyalgia     Fingernail abnormalities     right middle fingernail deformity    Generalized anxiety disorder     GERD (gastroesophageal reflux disease)     HPV (human papillomavirus)     Hypertension     IBS (irritable bowel syndrome) 2012    Migraine     Obesity     Pap smear for cervical cancer screening     2009- normal, pcos    PCOS (polycystic ovarian syndrome)     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     Past Surgical History:   Procedure Laterality Date    ARM SURGERY Left     ulna shortening    CARPAL TUNNEL RELEASE Bilateral      SECTION      CHOLECYSTECTOMY      LUMBAR [address] [place] SURGERY  10/10/2018    DR Danish Tsang [place] EGD TRANSORAL BIOPSY SINGLE/MULTIPLE N/A 2/15/2018    EGD ESOPHAGOGASTRODUODENOSCOPY performed by Tish Holguin MD at [address] ARTHROSCOPY Left     SHOULDER ARTHROSCOPY Left 2017    Ariadna Ramirez MD     WRIST ARTHROSCOPY Left      Social History     Socioeconomic History    Marital status:      Spouse name: Not on file    Number of children: Not on file    Years of education: Not on file    Highest education level: Not on file   Occupational History    Not on file   Tobacco Use    Smoking status: Current Every Day Smoker     Packs/day: 0.30     Years: 17.00     Pack years: 5.10     Types: Cigarettes    Smokeless tobacco: Never Used   Substance and Sexual Activity    Alcohol use: No    Drug use: No    Sexual activity: Never   Other Topics Concern    Not on file   Social History Narrative    Not on file     Social Determinants of Health     Financial Resource Strain: Low Risk     Difficulty of Paying Living Expenses: Not hard at all   Food Insecurity: No Food Insecurity    Worried About Running Out of Food in the Last Year: Never true    Say of Food in the Last Year: Never true   Transportation Needs:     Lack of Transportation (Medical):      Lack of Transportation (Non-Medical):    Physical Activity:     Days of Exercise per Week:     Minutes of Exercise per Session:    Stress:     Feeling of Stress :    Social Connections:     Frequency of Communication with Friends and Family:     Frequency of Social Gatherings with Friends and Family:     Attends Islam Services:     Active Member of Clubs or Organizations:     Attends Club or Organization Meetings:     Marital Status:    Intimate Partner Violence:     Fear of Current or Ex-Partner:     Emotionally Abused:     Physically Abused:     Sexually Abused:      Family History   Problem Relation Age of Onset    Arthritis Mother     Depression Mother     Depression Father     Diabetes Other     Cancer Other     Stroke Other     Obesity Other     Diabetes Maternal Aunt     Diabetes Maternal Uncle     Cancer Maternal Grandmother         colon    Cancer Paternal Grandfather      Allergies   Allergen Reactions    Iodides     Lamotrigine Other (See Comments)     Bruising, aggression       PMH, Surgical Hx, Family Hx, and Social Hx reviewed and updated. Health Maintenance reviewed. PHYSICAL EXAMINATION:  \"[x]\" Indicates a positive item  \"[]\" Indicates a negative item    Vital Signs: (As obtained by patient/caregiver or practitioner observation)    Blood pressure-  Heart rate-    Respiratory rate-    Temperature-  Pulse oximetry-     Constitutional: [x] Appears well-developed and well-nourished [x] No apparent distress      [] Abnormal-   Mental status  [x] Alert and awake  [x] Oriented to person/place/time [x]Able to follow commands      Eyes:  EOM    []  Normal  [] Abnormal-  Sclera  [x]  Normal  [] Abnormal -         Discharge [x]  None visible  [] Abnormal -    HENT:   [x] Normocephalic, atraumatic.   [] Abnormal   [x] Mouth/Throat: Mucous membranes are moist.     External Ears [x] Normal  [] Abnormal-     Neck: [x] No visualized mass     Pulmonary/Chest: [x] Respiratory effort normal.  [x] No visualized signs of difficulty breathing or respiratory distress        [] Abnormal-      Musculoskeletal:   [x] Normal gait with no signs of ataxia         [x] Normal range of motion of neck        [] Abnormal-       Neurological:       [x] No Facial Asymmetry (Cranial nerve 7 motor function) (limited exam to video visit)          [x] No gaze palsy        [] Abnormal-         Skin:        [x] No significant exanthematous lesions or discoloration noted on facial skin         [] Abnormal-            Psychiatric:       [x] Normal Affect [x] No Hallucinations        [] Abnormal-     Other pertinent observable physical exam findings-   Results for orders placed or performed in visit on 07/06/21   POC Glucose Fingerstick   Result Value Ref Range    POC Glucose 101 (H) 70 - 100 mg/dL   POCT COVID-19, Antigen   Result Value Ref Range    SARS-COV-2, POC NEGATIVE NEGATIVE   POCT urine pregnancy   Result Value Ref Range    Preg Test, Ur NEGATIVE NEGATIVE       ASSESSMENT/PLAN:  Assessment & Plan   Virginia Mendosa was seen today for back pain and muscle pain. Diagnoses and all orders for this visit:    Discussed with patient will prescribe medrol dose mahesh and advised to hold Motrin. May cont tylenol for pain. Will also have her stop the [facility] for a few days and replace with Zanaflex TID PRN. Advised to cont with warm compresses, stretching, and icy hot as needed. She has been on medrol in the past and worked well. Discussed administration and SE of medications. Advised f/u with PCP If symptoms do not improve with conservative measures and medications as patient may need further work up, PT, ect. Encouraged to call office with any questions or concerns. Strain of lumbar region, initial encounter    Other orders  -     methylPREDNISolone (MEDROL DOSEPACK) 4 MG tablet; Take by mouth. -     tiZANidine (ZANAFLEX) 4 MG tablet; Take 1 tablet by mouth every 8 hours as needed (back pain)      No orders of the defined types were placed in this encounter. Orders Placed This Encounter   Medications    methylPREDNISolone (MEDROL DOSEPACK) 4 MG tablet     Sig: Take by mouth.      Dispense:  1 kit     Refill:  0    tiZANidine (ZANAFLEX) 4 MG tablet     Sig: Take 1 tablet by mouth every 8 hours as needed (back pain)     Dispense:  15 tablet     Refill:  0     There are no discontinued medications. No follow-ups on file. Reviewed with the patient: current clinical status, medications, activities and diet. Side effects, adverse effects of the medication prescribed today, as well as treatment plan/ rationale and result expectations have been discussed with the patient who expresses understanding and desires to proceed. Close follow up to evaluate treatment results and for coordination of care. I have reviewed the patient's medical history in detail and updated the computerized patient record. Patient identification was verified at the start of the visit: Yes    Total time spent on this encounter: Not billed by time      --NICO Gomez - CNP on 8/29/2021 at 12:59 PM    An electronic signature was used to authenticate this note.

## 2021-09-06 DIAGNOSIS — K21.9 GASTROESOPHAGEAL REFLUX DISEASE WITHOUT ESOPHAGITIS: ICD-10-CM

## 2021-09-06 DIAGNOSIS — R11.0 NAUSEA: ICD-10-CM

## 2021-09-06 DIAGNOSIS — J30.9 ALLERGIC RHINITIS, UNSPECIFIED SEASONALITY, UNSPECIFIED TRIGGER: ICD-10-CM

## 2021-09-06 DIAGNOSIS — I10 ESSENTIAL HYPERTENSION: ICD-10-CM

## 2021-09-06 DIAGNOSIS — M79.7 FIBROMYALGIA: ICD-10-CM

## 2021-09-07 RX ORDER — PROMETHAZINE HYDROCHLORIDE 25 MG/1
25 TABLET ORAL EVERY 8 HOURS PRN
Qty: 30 TABLET | Refills: 0 | Status: SHIPPED | OUTPATIENT
Start: 2021-09-07 | End: 2021-11-30 | Stop reason: SDUPTHER

## 2021-09-07 RX ORDER — LEVOCETIRIZINE DIHYDROCHLORIDE 5 MG/1
5 TABLET, FILM COATED ORAL NIGHTLY
Qty: 30 TABLET | Refills: 3 | Status: SHIPPED | OUTPATIENT
Start: 2021-09-07 | End: 2021-11-30 | Stop reason: SDUPTHER

## 2021-09-07 RX ORDER — CYCLOBENZAPRINE HCL 10 MG
10 TABLET ORAL 3 TIMES DAILY PRN
Qty: 30 TABLET | Refills: 1 | Status: SHIPPED | OUTPATIENT
Start: 2021-09-07 | End: 2021-11-30 | Stop reason: SDUPTHER

## 2021-09-07 RX ORDER — PANTOPRAZOLE SODIUM 40 MG/1
40 TABLET, DELAYED RELEASE ORAL 2 TIMES DAILY
Qty: 60 TABLET | Refills: 3 | Status: SHIPPED | OUTPATIENT
Start: 2021-09-07 | End: 2021-11-30 | Stop reason: SDUPTHER

## 2021-09-07 RX ORDER — METOPROLOL TARTRATE 50 MG/1
50 TABLET, FILM COATED ORAL 2 TIMES DAILY
Qty: 60 TABLET | Refills: 5 | Status: SHIPPED | OUTPATIENT
Start: 2021-09-07 | End: 2021-11-30 | Stop reason: SDUPTHER

## 2021-09-07 RX ORDER — LISINOPRIL 20 MG/1
20 TABLET ORAL DAILY
Qty: 30 TABLET | Refills: 3 | Status: SHIPPED | OUTPATIENT
Start: 2021-09-07 | End: 2021-11-30 | Stop reason: SDUPTHER

## 2021-09-17 ENCOUNTER — TELEPHONE (OUTPATIENT)
Dept: FAMILY MEDICINE CLINIC | Age: 36
End: 2021-09-17

## 2021-09-17 DIAGNOSIS — F41.9 ANXIETY: Primary | ICD-10-CM

## 2021-09-17 RX ORDER — ALPRAZOLAM 0.25 MG/1
0.25 TABLET ORAL 3 TIMES DAILY PRN
Qty: 6 TABLET | Refills: 0 | Status: SHIPPED | OUTPATIENT
Start: 2021-09-17 | End: 2021-09-19

## 2021-09-17 NOTE — TELEPHONE ENCOUNTER
Pt calling states that she has a  to attend on Tuesday she called her physiatrists office they ar closed until Tuesday  Wants to know if she can get something for anxiety to help her get through this. States she can't function. . Her cousin passed away.        Uses [facility] [employer]    Pt [phone]

## 2021-10-23 ENCOUNTER — APPOINTMENT (OUTPATIENT)
Dept: GENERAL RADIOLOGY | Age: 36
End: 2021-10-23
Payer: COMMERCIAL

## 2021-10-23 ENCOUNTER — HOSPITAL ENCOUNTER (EMERGENCY)
Age: 36
Discharge: HOME | End: 2021-10-24
Attending: EMERGENCY MEDICINE
Payer: COMMERCIAL

## 2021-10-23 DIAGNOSIS — R07.89 ATYPICAL CHEST PAIN: ICD-10-CM

## 2021-10-23 DIAGNOSIS — R09.1 PLEURITIS: Primary | ICD-10-CM

## 2021-10-23 PROCEDURE — 71045 X-RAY EXAM CHEST 1 VIEW: CPT

## 2021-10-23 PROCEDURE — 93005 ELECTROCARDIOGRAM TRACING: CPT

## 2021-10-23 PROCEDURE — 99284 EMERGENCY DEPT VISIT MOD MDM: CPT

## 2021-10-23 PROCEDURE — 96372 THER/PROPH/DIAG INJ SC/IM: CPT

## 2021-10-23 RX ORDER — PREDNISONE 20 MG/1
20 TABLET ORAL DAILY
Qty: 7 TABLET | Refills: 0 | Status: SHIPPED | OUTPATIENT
Start: 2021-10-23 | End: 2021-10-30

## 2021-10-23 RX ORDER — DOXEPIN HYDROCHLORIDE 25 MG/1
25 CAPSULE ORAL NIGHTLY
COMMUNITY
End: 2022-03-01 | Stop reason: ALTCHOICE

## 2021-10-23 RX ORDER — KETOROLAC TROMETHAMINE 10 MG/1
10 TABLET, FILM COATED ORAL EVERY 6 HOURS PRN
Qty: 20 TABLET | Refills: 0 | Status: SHIPPED | OUTPATIENT
Start: 2021-10-23 | End: 2022-03-01 | Stop reason: ALTCHOICE

## 2021-10-23 RX ORDER — KETOROLAC TROMETHAMINE 30 MG/ML
60 INJECTION, SOLUTION INTRAMUSCULAR; INTRAVENOUS ONCE
Status: COMPLETED | OUTPATIENT
Start: 2021-10-23 | End: 2021-10-24

## 2021-10-23 RX ORDER — METOPROLOL SUCCINATE 50 MG/1
50 TABLET, EXTENDED RELEASE ORAL DAILY
COMMUNITY
End: 2022-03-01

## 2021-10-23 ASSESSMENT — ENCOUNTER SYMPTOMS
COLOR CHANGE: 0
VOMITING: 0
APNEA: 0
DIARRHEA: 0
WHEEZING: 0
ABDOMINAL PAIN: 0
SHORTNESS OF BREATH: 0
NAUSEA: 0
BACK PAIN: 0
EYE PAIN: 0
SORE THROAT: 0
SINUS PRESSURE: 0
PHOTOPHOBIA: 0
CONSTIPATION: 0
RHINORRHEA: 0
ABDOMINAL DISTENTION: 0
COUGH: 0

## 2021-10-23 ASSESSMENT — PAIN DESCRIPTION - PROGRESSION: CLINICAL_PROGRESSION: GRADUALLY WORSENING

## 2021-10-23 ASSESSMENT — PAIN DESCRIPTION - DESCRIPTORS: DESCRIPTORS: SQUEEZING

## 2021-10-23 ASSESSMENT — PAIN DESCRIPTION - PAIN TYPE: TYPE: ACUTE PAIN

## 2021-10-23 ASSESSMENT — PAIN DESCRIPTION - ORIENTATION: ORIENTATION: RIGHT;ANTERIOR;LOWER

## 2021-10-23 ASSESSMENT — PAIN DESCRIPTION - FREQUENCY: FREQUENCY: CONTINUOUS

## 2021-10-23 ASSESSMENT — PAIN SCALES - GENERAL: PAINLEVEL_OUTOF10: 8

## 2021-10-23 ASSESSMENT — PAIN DESCRIPTION - ONSET: ONSET: GRADUAL

## 2021-10-23 ASSESSMENT — PAIN DESCRIPTION - LOCATION: LOCATION: CHEST

## 2021-10-23 ASSESSMENT — PAIN DESCRIPTION - DIRECTION: RADIATING_TOWARDS: RIGHT AXILLARY

## 2021-10-24 VITALS
TEMPERATURE: 98.4 F | SYSTOLIC BLOOD PRESSURE: 132 MMHG | DIASTOLIC BLOOD PRESSURE: 85 MMHG | BODY MASS INDEX: 45.99 KG/M2 | RESPIRATION RATE: 20 BRPM | OXYGEN SATURATION: 98 % | WEIGHT: 293 LBS | HEART RATE: 75 BPM | HEIGHT: 67 IN

## 2021-10-24 LAB
EKG ATRIAL RATE: 85 BPM
EKG P AXIS: 36 DEGREES
EKG P-R INTERVAL: 140 MS
EKG Q-T INTERVAL: 372 MS
EKG QRS DURATION: 84 MS
EKG QTC CALCULATION (BAZETT): 442 MS
EKG R AXIS: 65 DEGREES
EKG T AXIS: 38 DEGREES
EKG VENTRICULAR RATE: 85 BPM

## 2021-10-24 PROCEDURE — 6360000002 HC RX W HCPCS: Performed by: EMERGENCY MEDICINE

## 2021-10-24 PROCEDURE — 93010 ELECTROCARDIOGRAM REPORT: CPT | Performed by: INTERNAL MEDICINE

## 2021-10-24 RX ADMIN — KETOROLAC TROMETHAMINE 60 MG: 30 INJECTION, SOLUTION INTRAMUSCULAR at 00:04

## 2021-10-24 NOTE — ED PROVIDER NOTES
[address] [place] [address] ED  eMERGENCY dEPARTMENT eNCOUnter      Pt Name: Alexandra Peres  MRN: [id number]  [place] 1985  Date of evaluation: 10/23/2021  Provider: Himanshu Davenport MD    CHIEF COMPLAINT       Chief Complaint   Patient presents with    Chest Pain     chest pain started yesterday, hurts worse with deep breathing and movement         HISTORY OF PRESENT ILLNESS   (Location/Symptom, Timing/Onset,Context/Setting, Quality, Duration, Modifying Factors, Severity)  Note limiting factors. Alexandra Peres is a 39 y.o. female who presents to the emergency department with complaint of right chest pain since yesterday  Pain is mostly pleuritic, worse with deep breath and coughing. He was tested for Covid pneumonia few days ago that were all negative. Smokes half a pack of cigarettes a day. History of hyperlipidemia, hypertension, asthma, generalized anxiety disorder, gastroesophageal reflux disease. She is morbidly obese with BMI of 50. 12. Pain is aching a scale of 1-10 and sharp. No other systemic symptoms. HPI    Nursing Notes were reviewed. REVIEW OF SYSTEMS    (2-9 systems for level 4, 10 or more for level 5)     Review of Systems   Constitutional: Negative. Negative for activity change, appetite change, chills, fatigue and fever. HENT: Negative for congestion, ear discharge, ear pain, hearing loss, rhinorrhea, sinus pressure and sore throat. Eyes: Negative for photophobia, pain and visual disturbance. Respiratory: Negative for apnea, cough, shortness of breath and wheezing. Cardiovascular: Positive for chest pain. Negative for palpitations and leg swelling. Gastrointestinal: Negative for abdominal distention, abdominal pain, constipation, diarrhea, nausea and vomiting. Endocrine: Negative for cold intolerance, heat intolerance and polyuria. Genitourinary: Negative for dysuria, flank pain, frequency and urgency.    Musculoskeletal: Negative for arthralgias, back pain, gait problem, myalgias and neck stiffness. Skin: Negative for color change, pallor and rash. Allergic/Immunologic: Negative for food allergies and immunocompromised state. Neurological: Negative for dizziness, tremors, syncope, weakness, light-headedness and headaches. Psychiatric/Behavioral: Negative for agitation, confusion and hallucinations. All other systems reviewed and are negative. Except as noted above the remainder of the review of systems was reviewed and negative.        PAST MEDICAL HISTORY     Past Medical History:   Diagnosis Date    Anxiety     Asthma     CAD (coronary artery disease)     Tachycardia    Depression 10/29/2013    Dyslipidemia     Elevated blood pressure     Fibromyalgia     Fingernail abnormalities     right middle fingernail deformity    Generalized anxiety disorder     GERD (gastroesophageal reflux disease)     HPV (human papillomavirus)     Hypertension     IBS (irritable bowel syndrome) 2012    Migraine     Obesity     Pap smear for cervical cancer screening     - normal, pcos    PCOS (polycystic ovarian syndrome)     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[facility] [address]     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         SURGICAL HISTORY       Past Surgical History:   Procedure Laterality Date    ARM SURGERY Left     ulna shortening    CARPAL TUNNEL RELEASE Bilateral      SECTION      CHOLECYSTECTOMY      LUMBAR [address] SURGERY  10/10/2018    DR Golden Gregg AK EGD TRANSORAL BIOPSY SINGLE/MULTIPLE N/A 2/15/2018    EGD ESOPHAGOGASTRODUODENOSCOPY performed by Basilio Irene MD at [address] ARTHROSCOPY Left     SHOULDER ARTHROSCOPY Left 2017    Lacey Quispe MD     WRIST ARTHROSCOPY Left          CURRENT MEDICATIONS       Previous Medications    ALBUTEROL (PROVENTIL) (2.5 MG/3ML) 0.083% NEBULIZER SOLUTION    USE 1 VIAL PER NEBULIZER FOUR TIMES A DAY AS NEEDED FOR SHORTNESS OF BREATH    ALBUTEROL SULFATE HFA (VENTOLIN HFA) 108 (90 BASE) MCG/ACT INHALER    Inhale 2 puffs into the lungs 4 times daily as needed for Wheezing    AZITHROMYCIN (ZITHROMAX Z-CRISTA) 250 MG TABLET    Take 2 tablets on day 1 then 1 tablet on days 2-5    CYCLOBENZAPRINE (FLEXERIL) 10 MG TABLET    Take 1 tablet by mouth 3 times daily as needed for Muscle spasms    DICYCLOMINE (BENTYL) 20 MG TABLET    Take 1 tablet by mouth every 6 hours    DOCUSATE SODIUM (DOK) 100 MG CAPSULE    Take 1 capsule by mouth 2 times daily as needed for Constipation    DOXEPIN (SINEQUAN) 25 MG CAPSULE    Take 25 mg by mouth nightly    LEVOCETIRIZINE (XYZAL) 5 MG TABLET    Take 1 tablet by mouth nightly    LISINOPRIL (PRINIVIL;ZESTRIL) 20 MG TABLET    Take 1 tablet by mouth daily    MELOXICAM (MOBIC) 7.5 MG TABLET    TAKE 1 TABLET DAILY AS NEEDED.     METOPROLOL SUCCINATE (TOPROL XL) 50 MG EXTENDED RELEASE TABLET    Take 50 mg by mouth daily    METOPROLOL TARTRATE (LOPRESSOR) 50 MG TABLET    Take 1 tablet by mouth 2 times daily    MIRTAZAPINE (REMERON) 30 MG TABLET    Take one (1) tablet by mouth at bedtime    PANTOPRAZOLE (PROTONIX) 40 MG TABLET    Take 1 tablet by mouth 2 times daily    PROMETHAZINE (PHENERGAN) 25 MG TABLET    Take 1 tablet by mouth every 8 hours as needed for Nausea    PROPRANOLOL (INDERAL) 20 MG TABLET    Take one (1) tablet by mouth twice a day    VENLAFAXINE (EFFEXOR XR) 150 MG EXTENDED RELEASE CAPSULE        VENLAFAXINE (EFFEXOR XR) 75 MG EXTENDED RELEASE CAPSULE    TAKE 1 CAPSULE EVERY EVENING       ALLERGIES     Iodides and Lamotrigine    FAMILY HISTORY       Family History   Problem Relation Age of Onset    Arthritis Mother     Depression Mother     Depression Father     Diabetes Other     Cancer Other     Stroke Other     Obesity Other     Diabetes Maternal Aunt     Diabetes Maternal Uncle     Cancer Maternal Grandmother         colon    Cancer Paternal Grandfather           SOCIAL HISTORY       Social History     Socioeconomic History    Marital status:      Spouse name: Not on file    Number of children: Not on file    Years of education: Not on file    Highest education level: Not on file   Occupational History    Not on file   Tobacco Use    Smoking status: Current Every Day Smoker     Packs/day: 0.30     Years: 17.00     Pack years: 5.10     Types: Cigarettes    Smokeless tobacco: Never Used   Substance and Sexual Activity    Alcohol use: No    Drug use: No    Sexual activity: Never   Other Topics Concern    Not on file   Social History Narrative    Not on file     Social Determinants of Health     Financial Resource Strain: Low Risk     Difficulty of Paying Living Expenses: Not hard at all   Food Insecurity: No Food Insecurity    Worried About Running Out of Food in the Last Year: Never true    Say of Food in the Last Year: Never true   Transportation Needs: Unmet Transportation Needs    Lack of Transportation (Medical): Yes    Lack of Transportation (Non-Medical): Yes   Physical Activity:     Days of Exercise per Week:     Minutes of Exercise per Session:    Stress:     Feeling of Stress :    Social Connections:     Frequency of Communication with Friends and Family:     Frequency of Social Gatherings with Friends and Family:     Attends [employer] Services:     Active Member of Clubs or Organizations:     Attends Club or Organization Meetings:     Marital Status:    Intimate Partner Violence:     Fear of Current or Ex-Partner:     Emotionally Abused:     Physically Abused:     Sexually Abused:        SCREENINGS             PHYSICAL EXAM    (up to 7 for level 4, 8 or more for level 5)     ED Triage Vitals [10/23/21 2309]   BP Temp Temp Source Pulse Resp SpO2 Height Weight   (!) 155/102 98.4 °F (36.9 °C) Oral 90 16 98 % 5' 7\" (1.702 m) (!) 320 lb (145.2 kg)       Physical Exam  Vitals and nursing note reviewed. Constitutional:       General: She is not in acute distress. Appearance: Normal appearance. She is well-developed.  She is obese. She is not ill-appearing, toxic-appearing or diaphoretic. HENT:      Head: Normocephalic and atraumatic. Nose: Nose normal. No congestion or rhinorrhea. Mouth/Throat:      Mouth: Mucous membranes are moist.      Pharynx: Oropharynx is clear. No oropharyngeal exudate or posterior oropharyngeal erythema. Eyes:      General: No scleral icterus. Right eye: No discharge. Left eye: No discharge. Extraocular Movements: Extraocular movements intact. Conjunctiva/sclera: Conjunctivae normal.      Pupils: Pupils are equal, round, and reactive to light. Neck:      Thyroid: No thyromegaly. Vascular: No carotid bruit or JVD. Trachea: No tracheal deviation. Cardiovascular:      Rate and Rhythm: Normal rate and regular rhythm. Heart sounds: Normal heart sounds. No murmur heard. No friction rub. No gallop. Pulmonary:      Effort: Pulmonary effort is normal. No respiratory distress. Breath sounds: Normal breath sounds. No stridor. No wheezing, rhonchi or rales. Chest:      Chest wall: No tenderness. Abdominal:      General: Bowel sounds are normal. There is no distension. Palpations: Abdomen is soft. There is no mass. Tenderness: There is no abdominal tenderness. There is no right CVA tenderness, left CVA tenderness, guarding or rebound. Hernia: No hernia is present. Musculoskeletal:         General: No swelling, tenderness, deformity or signs of injury. Normal range of motion. Cervical back: Normal range of motion and neck supple. No rigidity or tenderness. Right lower leg: No edema. Left lower leg: No edema. Lymphadenopathy:      Cervical: No cervical adenopathy. Skin:     General: Skin is warm and dry. Capillary Refill: Capillary refill takes less than 2 seconds. Coloration: Skin is not jaundiced or pale. Findings: No bruising, erythema, lesion or rash.    Neurological:      General: No focal deficit present. Mental Status: She is alert and oriented to person, place, and time. Mental status is at baseline. Cranial Nerves: No cranial nerve deficit. Sensory: No sensory deficit. Motor: No weakness or abnormal muscle tone. Coordination: Coordination normal.      Gait: Gait normal.      Deep Tendon Reflexes: Reflexes are normal and symmetric. Reflexes normal.   Psychiatric:         Behavior: Behavior normal.         Thought Content: Thought content normal.         Judgment: Judgment normal.         DIAGNOSTIC RESULTS     EKG: All EKG's are interpreted by the Emergency Department Physician who either signs or Co-signs this chart in the absence of a cardiologist.    Twelve-lead EKG shows normal sinus rhythm, rate 85 bpm, normal intervals, normal electrical axis, no acute ST-T wave changes. RADIOLOGY:   Non-plain film images such as CT, Ultrasound and MRI are read by the radiologist. Jphuber Escalante radiographicimages are visualized and preliminarily interpreted by the emergency physician with the below findings    Chest x-ray shows no active cardiopulmonary process. Interpretation per the Radiologist below, if available at the time of this note:    XR CHEST PORTABLE    (Results Pending)         ED BEDSIDE ULTRASOUND:   Performed by ED Physician - none    LABS:  Labs Reviewed - No data to display    All other labs were within normal range or not returned as of this dictation.     EMERGENCY DEPARTMENT COURSE and DIFFERENTIALDIAGNOSIS/MDM:   Vitals:    Vitals:    10/23/21 2309 10/23/21 2321   BP: (!) 155/102 (!) 163/91   Pulse: 90 84   Resp: 16 20   Temp: 98.4 °F (36.9 °C)    TempSrc: Oral    SpO2: 98% 98%   Weight: (!) 320 lb (145.2 kg)    Height: 5' 7\" (1.702 m)            MDM  Number of Diagnoses or Management Options     Amount and/or Complexity of Data Reviewed  Clinical lab tests: reviewed and ordered  Tests in the medicine section of CPT®: reviewed and ordered    Risk of Complications,

## 2021-11-30 ENCOUNTER — VIRTUAL VISIT (OUTPATIENT)
Dept: FAMILY MEDICINE CLINIC | Age: 36
End: 2021-11-30
Payer: COMMERCIAL

## 2021-11-30 DIAGNOSIS — R11.0 NAUSEA: ICD-10-CM

## 2021-11-30 DIAGNOSIS — M79.7 FIBROMYALGIA: ICD-10-CM

## 2021-11-30 DIAGNOSIS — K21.9 GASTROESOPHAGEAL REFLUX DISEASE WITHOUT ESOPHAGITIS: ICD-10-CM

## 2021-11-30 DIAGNOSIS — I10 ESSENTIAL HYPERTENSION: ICD-10-CM

## 2021-11-30 DIAGNOSIS — J02.9 ACUTE PHARYNGITIS, UNSPECIFIED ETIOLOGY: ICD-10-CM

## 2021-11-30 DIAGNOSIS — R11.2 NON-INTRACTABLE VOMITING WITH NAUSEA, UNSPECIFIED VOMITING TYPE: Primary | ICD-10-CM

## 2021-11-30 DIAGNOSIS — J30.9 ALLERGIC RHINITIS, UNSPECIFIED SEASONALITY, UNSPECIFIED TRIGGER: ICD-10-CM

## 2021-11-30 PROCEDURE — 99442 PR PHYS/QHP TELEPHONE EVALUATION 11-20 MIN: CPT

## 2021-11-30 PROCEDURE — 87804 INFLUENZA ASSAY W/OPTIC: CPT

## 2021-11-30 PROCEDURE — 81002 URINALYSIS NONAUTO W/O SCOPE: CPT

## 2021-11-30 PROCEDURE — 87426 SARSCOV CORONAVIRUS AG IA: CPT

## 2021-11-30 RX ORDER — ZIPRASIDONE HYDROCHLORIDE 40 MG/1
CAPSULE ORAL
COMMUNITY
Start: 2021-11-23 | End: 2022-03-01

## 2021-11-30 RX ORDER — BENZONATATE 200 MG/1
CAPSULE ORAL
COMMUNITY
Start: 2021-10-01 | End: 2022-03-01 | Stop reason: ALTCHOICE

## 2021-11-30 RX ORDER — AMOXICILLIN 875 MG/1
TABLET, COATED ORAL
COMMUNITY
Start: 2021-10-01 | End: 2022-03-01

## 2021-11-30 RX ORDER — LIDOCAINE HYDROCHLORIDE 20 MG/ML
SOLUTION OROPHARYNGEAL
COMMUNITY
Start: 2021-10-01 | End: 2022-03-01

## 2021-11-30 RX ORDER — DOXEPIN HYDROCHLORIDE 50 MG/1
CAPSULE ORAL
COMMUNITY
Start: 2021-11-23 | End: 2022-03-23 | Stop reason: DRUGHIGH

## 2021-11-30 RX ORDER — FLUCONAZOLE 150 MG/1
TABLET ORAL
COMMUNITY
Start: 2021-10-01 | End: 2022-03-23 | Stop reason: ALTCHOICE

## 2021-11-30 ASSESSMENT — ENCOUNTER SYMPTOMS
COLOR CHANGE: 0
ABDOMINAL PAIN: 0
APNEA: 0
SHORTNESS OF BREATH: 0
CHEST TIGHTNESS: 0
EYE PAIN: 0
SORE THROAT: 1
DIARRHEA: 0
RHINORRHEA: 0
FACIAL SWELLING: 0
NAUSEA: 1
TROUBLE SWALLOWING: 0
EYE ITCHING: 0
EYE DISCHARGE: 0
VOMITING: 1
SINUS PRESSURE: 0
BACK PAIN: 0
WHEEZING: 0
ABDOMINAL DISTENTION: 0
SINUS PAIN: 0
COUGH: 0

## 2021-11-30 NOTE — PROGRESS NOTES
2021    TELEHEALTH EVALUATION -- Audio/Visual (During UHMK[facility]-45 public health emergency)    Due to COVID 19 outbreak, patient's office visit was converted to a virtual visit. Patient was contacted and agreed to proceed with a virtual visit via Telephone Visit  The risks and benefits of converting to a virtual visit were discussed in light of the current infectious disease epidemic. Patient also understood that insurance coverage and co-pays are up to their individual insurance plans. HPI:    Vandana Gaytan (:  1985) has requested an audio/video evaluation for the following concern(s):  Chief Complaint   Patient presents with    Pharyngitis     3-4 days     Generalized Body Aches     3-4 days    Nausea & Vomiting     3-4 days    Urinary Frequency     3-4 days    Headache     3-4 days        Reporting symptoms of fatigue sore throat nausea and vomiting starting 2 days ago. Also reporting urinary frequency. Patient is taking ibuprofen for throat pain.      Patient Active Problem List   Diagnosis    Asthma    Generalized anxiety disorder    GERD (gastroesophageal reflux disease)    Migraine    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HCC)    PCOS (polycystic ovarian syndrome)    Elevated blood pressure reading    Human papilloma virus infection    Dyslipidemia    Obesity    Malabsorption syndrome    Family history of cancer    IBS (irritable bowel syndrome)    Status post cholecystectomy    Nicotine dependence    Vertigo    Depression    Coagulopathy (HCC)    Fibromyalgia     Past Medical History:   Diagnosis Date    Anxiety     Asthma     CAD (coronary artery disease)     Tachycardia    Depression 10/29/2013    Dyslipidemia     Elevated blood pressure     Fibromyalgia     Fingernail abnormalities     right middle fingernail deformity    Generalized anxiety disorder     GERD (gastroesophageal reflux disease)     HPV (human papillomavirus)     Hypertension     IBS (irritable bowel syndrome) 7/31/2012    Migraine     Obesity     Pap smear for cervical cancer screening     2009- normal, pcos    PCOS (polycystic ovarian syndrome)     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[facility] [address]     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         Review of Systems   Constitutional: Positive for fatigue. Negative for activity change, appetite change, chills, diaphoresis and fever. HENT: Positive for sore throat. Negative for congestion, ear discharge, ear pain, facial swelling, hearing loss, mouth sores, postnasal drip, rhinorrhea, sinus pressure, sinus pain and trouble swallowing. Eyes: Negative for pain, discharge, itching and visual disturbance. Respiratory: Negative for apnea, cough, chest tightness, shortness of breath and wheezing. Cardiovascular: Negative for chest pain, palpitations and leg swelling. Gastrointestinal: Positive for nausea and vomiting. Negative for abdominal distention, abdominal pain and diarrhea. Endocrine: Negative for cold intolerance and heat intolerance. Genitourinary: Positive for frequency. Negative for decreased urine volume, difficulty urinating, dysuria, flank pain, hematuria and urgency. Musculoskeletal: Positive for myalgias. Negative for arthralgias and back pain. Skin: Negative for color change, pallor and rash. Neurological: Negative for dizziness, syncope, weakness, light-headedness and headaches. Hematological: Negative for adenopathy. Psychiatric/Behavioral: Negative for behavioral problems, confusion, dysphoric mood, hallucinations and sleep disturbance. Prior to Visit Medications    Medication Sig Taking?  Authorizing Provider   ziprasidone (GEODON) 40 MG capsule TAKE 1 CAPSULE BY MOUTH EVERY DAY  Historical Provider, MD   lidocaine viscous hcl (XYLOCAINE) 2 % SOLN solution GARGLE USING 15 ML MOUTH/THROAT AND SPIT EVERY 6 HOURS  Historical Provider, MD   fluconazole (DIFLUCAN) 150 MG tablet TAKE 1 TABLET BY MOUTH AS NEEDED WITH ATB USE  Historical Provider, MD doxepin (SINEQUAN) 50 MG capsule TAKE 1 CAPSULE BY MOUTH AT BEDTIME  Historical Provider, MD   benzonatate (TESSALON) 200 MG capsule SWALLOW WHOLE 1 (ONE) CAPSULE EVERY 8 HOURS AS NEEDED FOR COUGH  Historical Provider, MD   amoxicillin (AMOXIL) 875 MG tablet TAKE 1 TABLET BY MOUTH TWICE DAILY FOR 10 DAYS  Historical Provider, MD   metoprolol succinate (TOPROL XL) 50 MG extended release tablet Take 50 mg by mouth daily  Historical Provider, MD   doxepin (SINEQUAN) 25 MG capsule Take 25 mg by mouth nightly  Historical Provider, MD   ketorolac (TORADOL) 10 MG tablet Take 1 tablet by mouth every 6 hours as needed for Pain  Bon Lam, MD   levocetirizine (XYZAL) 5 MG tablet Take 1 tablet by mouth nightly  NICO Denis CNP   pantoprazole (PROTONIX) 40 MG tablet Take 1 tablet by mouth 2 times daily  NICO Denis CNP   metoprolol tartrate (LOPRESSOR) 50 MG tablet Take 1 tablet by mouth 2 times daily  NICO Denis CNP   cyclobenzaprine (FLEXERIL) 10 MG tablet Take 1 tablet by mouth 3 times daily as needed for Muscle spasms  NICO Denis CNP   promethazine (PHENERGAN) 25 MG tablet Take 1 tablet by mouth every 8 hours as needed for Nausea  NICO Denis CNP   lisinopril (PRINIVIL;ZESTRIL) 20 MG tablet Take 1 tablet by mouth daily  NICO Denis CNP   meloxicam (MOBIC) 7.5 MG tablet TAKE 1 TABLET DAILY AS NEEDED.   Historical Provider, MD   albuterol (PROVENTIL) (2.5 MG/3ML) 0.083% nebulizer solution USE 1 VIAL PER NEBULIZER FOUR TIMES A DAY AS NEEDED FOR SHORTNESS OF BREATH  Nisha NICO Cowart CNP   mirtazapine (REMERON) 30 MG tablet Take one (1) tablet by mouth at bedtime  Patient not taking: Reported on 8/29/2021  Historical Provider, MD   propranolol (INDERAL) 20 MG tablet Take one (1) tablet by mouth twice a day  Historical Provider, MD   azithromycin (ZITHROMAX Z-CRISTA) 250 MG tablet Take 2 tablets on day 1 then 1 tablet on days 2-5  Patient not taking: Reported on 2021  NICO Amin CNP   albuterol sulfate HFA (VENTOLIN HFA) 108 (90 Base) MCG/ACT inhaler Inhale 2 puffs into the lungs 4 times daily as needed for Wheezing  NICO Amin CNP   venlafaxine (EFFEXOR XR) 75 MG extended release capsule TAKE 1 CAPSULE EVERY EVENING  Historical Provider, MD   venlafaxine (EFFEXOR XR) 150 MG extended release capsule   Svitlana Darling   dicyclomine (BENTYL) 20 MG tablet Take 1 tablet by mouth every 6 hours  NICO Toro CNP   docusate sodium (DOK) 100 MG capsule Take 1 capsule by mouth 2 times daily as needed for Constipation  NICO Toro CNP       Social History     Tobacco Use    Smoking status: Current Every Day Smoker     Packs/day: 0.30     Years: 17.00     Pack years: 5.10     Types: Cigarettes    Smokeless tobacco: Never Used   Substance Use Topics    Alcohol use: No    Drug use: No          PAST MEDICAL HISTORY         Diagnosis Date    Anxiety     Asthma     CAD (coronary artery disease)     Tachycardia    Depression 10/29/2013    Dyslipidemia     Elevated blood pressure     Fibromyalgia     Fingernail abnormalities     right middle fingernail deformity    Generalized anxiety disorder     GERD (gastroesophageal reflux disease)     HPV (human papillomavirus)     Hypertension     IBS (irritable bowel syndrome) 2012    Migraine     Obesity     Pap smear for cervical cancer screening     - normal, pcos    PCOS (polycystic ovarian syndrome)     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     SURGICAL HISTORY     Patient  has a past surgical history that includes Cholecystectomy;  section; Carpal tunnel release (Bilateral); Wrist arthroscopy (Left); Arm Surgery (Left); Shoulder arthroscopy (Left); pr egd transoral biopsy single/multiple (N/A, 2/15/2018);  Shoulder arthroscopy (Left, 2017); and Lumbar disc surgery (10/10/2018). CURRENT MEDICATIONS       Previous Medications    ALBUTEROL (PROVENTIL) (2.5 MG/3ML) 0.083% NEBULIZER SOLUTION    USE 1 VIAL PER NEBULIZER FOUR TIMES A DAY AS NEEDED FOR SHORTNESS OF BREATH    ALBUTEROL SULFATE HFA (VENTOLIN HFA) 108 (90 BASE) MCG/ACT INHALER    Inhale 2 puffs into the lungs 4 times daily as needed for Wheezing    AMOXICILLIN (AMOXIL) 875 MG TABLET    TAKE 1 TABLET BY MOUTH TWICE DAILY FOR 10 DAYS    AZITHROMYCIN (ZITHROMAX Z-CRISTA) 250 MG TABLET    Take 2 tablets on day 1 then 1 tablet on days 2-5    BENZONATATE (TESSALON) 200 MG CAPSULE    SWALLOW WHOLE 1 (ONE) CAPSULE EVERY 8 HOURS AS NEEDED FOR COUGH    CYCLOBENZAPRINE (FLEXERIL) 10 MG TABLET    Take 1 tablet by mouth 3 times daily as needed for Muscle spasms    DICYCLOMINE (BENTYL) 20 MG TABLET    Take 1 tablet by mouth every 6 hours    DOCUSATE SODIUM (DOK) 100 MG CAPSULE    Take 1 capsule by mouth 2 times daily as needed for Constipation    DOXEPIN (SINEQUAN) 25 MG CAPSULE    Take 25 mg by mouth nightly    DOXEPIN (SINEQUAN) 50 MG CAPSULE    TAKE 1 CAPSULE BY MOUTH AT BEDTIME    FLUCONAZOLE (DIFLUCAN) 150 MG TABLET    TAKE 1 TABLET BY MOUTH AS NEEDED WITH ATB USE    KETOROLAC (TORADOL) 10 MG TABLET    Take 1 tablet by mouth every 6 hours as needed for Pain    LEVOCETIRIZINE (XYZAL) 5 MG TABLET    Take 1 tablet by mouth nightly    LIDOCAINE VISCOUS HCL (XYLOCAINE) 2 % SOLN SOLUTION    GARGLE USING 15 ML MOUTH/THROAT AND SPIT EVERY 6 HOURS    LISINOPRIL (PRINIVIL;ZESTRIL) 20 MG TABLET    Take 1 tablet by mouth daily    MELOXICAM (MOBIC) 7.5 MG TABLET    TAKE 1 TABLET DAILY AS NEEDED.     METOPROLOL SUCCINATE (TOPROL XL) 50 MG EXTENDED RELEASE TABLET    Take 50 mg by mouth daily    METOPROLOL TARTRATE (LOPRESSOR) 50 MG TABLET    Take 1 tablet by mouth 2 times daily    MIRTAZAPINE (REMERON) 30 MG TABLET    Take one (1) tablet by mouth at bedtime    PANTOPRAZOLE (PROTONIX) 40 MG TABLET    Take 1 tablet by mouth 2 times daily PROMETHAZINE (PHENERGAN) 25 MG TABLET    Take 1 tablet by mouth every 8 hours as needed for Nausea    PROPRANOLOL (INDERAL) 20 MG TABLET    Take one (1) tablet by mouth twice a day    VENLAFAXINE (EFFEXOR XR) 150 MG EXTENDED RELEASE CAPSULE        VENLAFAXINE (EFFEXOR XR) 75 MG EXTENDED RELEASE CAPSULE    TAKE 1 CAPSULE EVERY EVENING    ZIPRASIDONE (GEODON) 40 MG CAPSULE    TAKE 1 CAPSULE BY MOUTH EVERY DAY     ALLERGIES     Patient is is allergic to iodides and lamotrigine. FAMILY HISTORY     Patient'sfamily history includes Arthritis in her mother; Cancer in her maternal grandmother, paternal grandfather, and another family member; Depression in her father and mother; Diabetes in her maternal aunt, maternal uncle, and another family member; Obesity in an other family member; Stroke in an other family member. HISTORY     Patient  reports that she has been smoking cigarettes. She has a 5.10 pack-year smoking history. She has never used smokeless tobacco. She reports that she does not drink alcohol and does not use drugs. PHYSICAL EXAMINATION:  Patient-Reported Vitals 11/30/2021   Patient-Reported Weight 320lb   Patient-Reported Height 5 7   Patient-Reported Systolic -   Patient-Reported Diastolic -   Patient-Reported Pulse -   Patient-Reported Temperature 100.4         No exam  Phone visit  Patient in no significant distress, conversant    Due to this being a TeleHealth encounter, evaluation of the following organ systems is limited: Vitals/Constitutional/EENT/Resp/CV/GI//MS/Neuro/Skin/Heme-Lymph-Imm.     Lab Results   Component Value Date    WBC 10.5 03/06/2020    HGB 14.1 03/06/2020    HCT 42.7 03/06/2020     03/06/2020    CHOL 198 08/15/2019    TRIG 160 (H) 08/15/2019    HDL 37 (L) 08/15/2019    ALT 17 03/06/2020    AST 22 03/06/2020     03/06/2020    K 3.9 03/06/2020     03/06/2020    CREATININE 0.81 03/06/2020    BUN 9 03/06/2020    CO2 22 03/06/2020    TSH 1.540 07/28/2015    INR 0.9 07/28/2015    LABA1C 5.1 01/24/2019       15  minute call    ASSESSMENT/PLAN:     Diagnosis Orders   1. Non-intractable vomiting with nausea, unspecified vomiting type  POCT Urinalysis no Micro    Culture, Urine    POCT COVID-19, Antigen    POCT Influenza A/B   2. Acute pharyngitis, unspecified etiology  POCT COVID-19, Antigen    POCT Influenza A/B       symptoms of fatigue sore throat nausea and vomiting starting 2 days ago. Also reporting urinary frequency. Orders placed for rapid Covid rapid flu POC urinalysis and urine culture. Patient is going to come into the clinic to have testing performed we will follow up with her when results are back. Return if symptoms worsen or fail to improve, for Follow up with PCP. An  electronic signature was used to authenticate this note. --NICO Hernandez CNP on 11/30/2021 at 2:46 PM        Pursuant to the emergency declaration under the [facility] waiver authority and the [employer] Act, this Virtual  Visit was conducted, with patient's consent, to reduce the patient's risk of exposure to COVID-19 and provide continuity of care for an established patient.

## 2021-12-03 RX ORDER — METOPROLOL TARTRATE 50 MG/1
50 TABLET, FILM COATED ORAL 2 TIMES DAILY
Qty: 60 TABLET | Refills: 5 | Status: SHIPPED | OUTPATIENT
Start: 2021-12-03 | End: 2022-04-27 | Stop reason: SDUPTHER

## 2021-12-03 RX ORDER — CYCLOBENZAPRINE HCL 10 MG
10 TABLET ORAL 3 TIMES DAILY PRN
Qty: 30 TABLET | Refills: 1 | Status: SHIPPED | OUTPATIENT
Start: 2021-12-03 | End: 2022-04-27 | Stop reason: SDUPTHER

## 2021-12-03 RX ORDER — LISINOPRIL 20 MG/1
20 TABLET ORAL DAILY
Qty: 30 TABLET | Refills: 3 | Status: SHIPPED | OUTPATIENT
Start: 2021-12-03 | End: 2022-04-27 | Stop reason: SDUPTHER

## 2021-12-03 RX ORDER — LEVOCETIRIZINE DIHYDROCHLORIDE 5 MG/1
5 TABLET, FILM COATED ORAL NIGHTLY
Qty: 30 TABLET | Refills: 3 | Status: SHIPPED | OUTPATIENT
Start: 2021-12-03 | End: 2022-04-27 | Stop reason: SDUPTHER

## 2021-12-03 RX ORDER — PANTOPRAZOLE SODIUM 40 MG/1
40 TABLET, DELAYED RELEASE ORAL 2 TIMES DAILY
Qty: 60 TABLET | Refills: 3 | Status: SHIPPED | OUTPATIENT
Start: 2021-12-03 | End: 2022-04-27 | Stop reason: SDUPTHER

## 2021-12-03 RX ORDER — PROMETHAZINE HYDROCHLORIDE 25 MG/1
25 TABLET ORAL EVERY 8 HOURS PRN
Qty: 30 TABLET | Refills: 0 | Status: SHIPPED | OUTPATIENT
Start: 2021-12-03 | End: 2022-03-28 | Stop reason: SDUPTHER

## 2022-03-01 ENCOUNTER — TELEMEDICINE (OUTPATIENT)
Dept: FAMILY MEDICINE CLINIC | Age: 37
End: 2022-03-01
Payer: COMMERCIAL

## 2022-03-01 ENCOUNTER — PATIENT MESSAGE (OUTPATIENT)
Dept: FAMILY MEDICINE CLINIC | Age: 37
End: 2022-03-01

## 2022-03-01 DIAGNOSIS — M54.40 BACK PAIN OF LUMBAR REGION WITH SCIATICA: Primary | ICD-10-CM

## 2022-03-01 PROCEDURE — 99213 OFFICE O/P EST LOW 20 MIN: CPT | Performed by: NURSE PRACTITIONER

## 2022-03-01 PROCEDURE — G8427 DOCREV CUR MEDS BY ELIG CLIN: HCPCS | Performed by: NURSE PRACTITIONER

## 2022-03-01 RX ORDER — PROPRANOLOL HYDROCHLORIDE 40 MG/1
TABLET ORAL
COMMUNITY
Start: 2022-01-18 | End: 2022-03-23 | Stop reason: ALTCHOICE

## 2022-03-01 RX ORDER — DOXEPIN HYDROCHLORIDE 75 MG/1
CAPSULE ORAL
COMMUNITY
Start: 2022-02-22

## 2022-03-01 RX ORDER — PALIPERIDONE 3 MG/1
TABLET, EXTENDED RELEASE ORAL
COMMUNITY
Start: 2022-02-24

## 2022-03-01 RX ORDER — METHYLPREDNISOLONE 4 MG/1
TABLET ORAL
Qty: 1 KIT | Refills: 1 | Status: SHIPPED | OUTPATIENT
Start: 2022-03-01 | End: 2022-03-23 | Stop reason: ALTCHOICE

## 2022-03-01 ASSESSMENT — PATIENT HEALTH QUESTIONNAIRE - PHQ9
7. TROUBLE CONCENTRATING ON THINGS, SUCH AS READING THE NEWSPAPER OR WATCHING TELEVISION: 0
5. POOR APPETITE OR OVEREATING: 0
9. THOUGHTS THAT YOU WOULD BE BETTER OFF DEAD, OR OF HURTING YOURSELF: 0
2. FEELING DOWN, DEPRESSED OR HOPELESS: 0
SUM OF ALL RESPONSES TO PHQ QUESTIONS 1-9: 0
3. TROUBLE FALLING OR STAYING ASLEEP: 0
SUM OF ALL RESPONSES TO PHQ QUESTIONS 1-9: 0
4. FEELING TIRED OR HAVING LITTLE ENERGY: 0
10. IF YOU CHECKED OFF ANY PROBLEMS, HOW DIFFICULT HAVE THESE PROBLEMS MADE IT FOR YOU TO DO YOUR WORK, TAKE CARE OF THINGS AT HOME, OR GET ALONG WITH OTHER PEOPLE: 0
SUM OF ALL RESPONSES TO PHQ QUESTIONS 1-9: 0
6. FEELING BAD ABOUT YOURSELF - OR THAT YOU ARE A FAILURE OR HAVE LET YOURSELF OR YOUR FAMILY DOWN: 0
SUM OF ALL RESPONSES TO PHQ QUESTIONS 1-9: 0
8. MOVING OR SPEAKING SO SLOWLY THAT OTHER PEOPLE COULD HAVE NOTICED. OR THE OPPOSITE, BEING SO FIGETY OR RESTLESS THAT YOU HAVE BEEN MOVING AROUND A LOT MORE THAN USUAL: 0

## 2022-03-01 ASSESSMENT — ENCOUNTER SYMPTOMS
BACK PAIN: 1
DIARRHEA: 0
CONSTIPATION: 0
ABDOMINAL PAIN: 0
NAUSEA: 0
COUGH: 0

## 2022-03-01 NOTE — PROGRESS NOTES
TELEHEALTH EVALUATION -- Audio/Visual (During YPFXJ- public health emergency)    -   Benedict Kemp is a 39 y.o. female being evaluated by a Virtual Visit (video visit) encounter to address concerns as mentioned above. A caregiver was present when appropriate. Due to this being a TeleHealth encounter (During YJFTC- public health emergency), evaluation of the following organ systems was limited: Vitals/Constitutional/EENT/Resp/CV/GI//MS/Neuro/Skin/Heme-Lymph-Imm. Pursuant to the emergency declaration under the [address] [place] [address], [address] [place] [address] authority and the [employer] Act, this Virtual Visit was conducted with patient's (and/or legal guardian's) consent, to reduce the patient's risk of exposure to COVID-19 and provide necessary medical care. The patient (and/or legal guardian) has also been advised to contact this office for worsening conditions or problems, and seek emergency medical treatment and/or call 911 if deemed necessary. Patient was contacted and agreed to proceed with a virtual visit via Telephone Visit  The risks and benefits of converting to a virtual visit were discussed in light of the current infectious disease epidemic. Patient also understood that insurance coverage and co-pays are up to their individual insurance plans. Patient was located at their home. Provider was located at their office.      3/1/2022  Benedict Kemp (:  1985) has requested an audio/video evaluation for the following concern(s):    HPI   VV audio for back pain   Affected area lumbar back with sciatica of right   Persistent pain for 2 months   Bending and twisting exacerbate pain   Difficulty even wiping herself after restroom   Denies injury or trauma to back   Denies numbness or tinging of feet   Denies foot drop   Denies loss of bowel or bladder function   Able to sleep, but difficulty getting comfortable   Has tried both tylenol and NSAIDS  Applied heat to back   10/2018 L4-5 microdiscectomy at [facility]                      Review of Systems   Constitutional: Positive for activity change. Negative for appetite change, chills, diaphoresis, fatigue and fever. Respiratory: Negative for cough. Cardiovascular: Negative for chest pain and palpitations. Gastrointestinal: Negative for abdominal pain, constipation, diarrhea and nausea. Genitourinary: Positive for urgency. Musculoskeletal: Positive for back pain. Negative for neck pain and neck stiffness. Neurological: Negative for dizziness, light-headedness and headaches. Psychiatric/Behavioral: Positive for sleep disturbance. Prior to Visit Medications    Medication Sig Taking? Authorizing Provider   doxepin (SINEQUAN) 75 MG capsule Take one (1) capsule by mouth at bedtime Yes Historical Provider, MD   paliperidone (INVEGA) 3 MG extended release tablet Take one (1) tablet by mouth at bedtime Yes Historical Provider, MD   propranolol (INDERAL) 40 MG tablet TAKE 1 TABLET BY MOUTH TWICE DAILY Yes Historical Provider, MD   methylPREDNISolone (MEDROL, CRISTA,) 4 MG tablet Take by mouth.  Yes NICO Roman NP   levocetirizine (XYZAL) 5 MG tablet Take 1 tablet by mouth nightly Yes NICO Henning CNP   pantoprazole (PROTONIX) 40 MG tablet Take 1 tablet by mouth 2 times daily Yes NICO Henning CNP   metoprolol tartrate (LOPRESSOR) 50 MG tablet Take 1 tablet by mouth 2 times daily Yes NICO Henning CNP   cyclobenzaprine (FLEXERIL) 10 MG tablet Take 1 tablet by mouth 3 times daily as needed for Muscle spasms Yes NICO Henning CNP   promethazine (PHENERGAN) 25 MG tablet Take 1 tablet by mouth every 8 hours as needed for Nausea Yes NICO Henning CNP   lisinopril (PRINIVIL;ZESTRIL) 20 MG tablet Take 1 tablet by mouth daily Yes NICO Henning CNP   doxepin (SINEQUAN) 50 MG capsule TAKE 1 CAPSULE BY MOUTH AT BEDTIME Yes Historical Provider, MD   meloxicam (MOBIC) 7.5 MG tablet TAKE 1 TABLET DAILY AS NEEDED.  Yes Historical Provider, MD   albuterol (PROVENTIL) (2.5 MG/3ML) 0.083% nebulizer solution USE 1 VIAL PER NEBULIZER FOUR TIMES A DAY AS NEEDED FOR SHORTNESS OF BREATH Yes NICO Cespedes CNP   albuterol sulfate HFA (VENTOLIN HFA) 108 (90 Base) MCG/ACT inhaler Inhale 2 puffs into the lungs 4 times daily as needed for Wheezing Yes NICO Tobin CNP   venlafaxine (EFFEXOR XR) 75 MG extended release capsule TAKE 1 CAPSULE EVERY EVENING Yes Historical Provider, MD   venlafaxine (EFFEXOR XR) 150 MG extended release capsule  Yes Svitlana Darling   dicyclomine (BENTYL) 20 MG tablet Take 1 tablet by mouth every 6 hours Yes NICO Grimm CNP   docusate sodium (DOK) 100 MG capsule Take 1 capsule by mouth 2 times daily as needed for Constipation Yes NICO Grimm CNP   fluconazole (DIFLUCAN) 150 MG tablet TAKE 1 TABLET BY MOUTH AS NEEDED WITH ATB USE  Patient not taking: Reported on 3/1/2022  Historical Provider, MD       Past Medical History:   Diagnosis Date    Anxiety     Asthma     CAD (coronary artery disease)     Tachycardia    Depression 10/29/2013    Dyslipidemia     Elevated blood pressure     Fibromyalgia     Fingernail abnormalities     right middle fingernail deformity    Generalized anxiety disorder     GERD (gastroesophageal reflux disease)     HPV (human papillomavirus)     Hypertension     IBS (irritable bowel syndrome) 2012    Migraine     Obesity     Pap smear for cervical cancer screening     - normal, pcos    PCOS (polycystic ovarian syndrome)     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     Past Surgical History:   Procedure Laterality Date    ARM SURGERY Left     ulna shortening    CARPAL TUNNEL RELEASE Bilateral      SECTION      CHOLECYSTECTOMY      [address]UMBAR [address] 10/10/2018    DR Courtney Morgan [place] EGD TRANSORAL BIOPSY SINGLE/MULTIPLE N/A 2/15/2018    EGD ESOPHAGOGASTRODUODENOSCOPY performed by Fernanda Brink MD at [address] ARTHROSCOPY Left     SHOULDER ARTHROSCOPY Left 11/01/2017    Catina Cortes MD     WRIST ARTHROSCOPY Left      Social History     Socioeconomic History    Marital status:      Spouse name: Not on file    Number of children: Not on file    Years of education: Not on file    Highest education level: Not on file   Occupational History    Not on file   Tobacco Use    Smoking status: Current Every Day Smoker     Packs/day: 0.30     Years: 17.00     Pack years: 5.10     Types: Cigarettes    Smokeless tobacco: Never Used   Substance and Sexual Activity    Alcohol use: No    Drug use: No    Sexual activity: Never   Other Topics Concern    Not on file   Social History Narrative    Not on file     Social Determinants of Health     Financial Resource Strain: Low Risk     Difficulty of Paying Living Expenses: Not hard at all   Food Insecurity: No Food Insecurity    Worried About Running Out of Food in the Last Year: Never true    Say of Food in the Last Year: Never true   Transportation Needs: Unmet Transportation Needs    Lack of Transportation (Medical):  Yes    Lack of Transportation (Non-Medical): Yes   Physical Activity:     Days of Exercise per Week: Not on file    Minutes of Exercise per Session: Not on file   Stress:     Feeling of Stress : Not on file   Social Connections:     Frequency of Communication with Friends and Family: Not on file    Frequency of Social Gatherings with Friends and Family: Not on file    Attends Jewish Services: Not on file    Active Member of Clubs or Organizations: Not on file    Attends Club or Organization Meetings: Not on file    Marital Status: Not on file   Intimate Partner Violence:     Fear of Current or Ex-Partner: Not on file    Emotionally Abused: Not on file   Sarah Hutchins Physically Abused: Not on file    Sexually Abused: Not on file   Housing Stability:     Unable to Pay for Housing in the Last Year: Not on file    Number of Places Lived in the Last Year: Not on file    Unstable Housing in the Last Year: Not on file     Family History   Problem Relation Age of Onset    Arthritis Mother     Depression Mother     Depression Father     Diabetes Other     Cancer Other     Stroke Other     Obesity Other     Diabetes Maternal Aunt     Diabetes Maternal Uncle     Cancer Maternal Grandmother         colon    Cancer Paternal Grandfather      Allergies   Allergen Reactions    Iodides     Lamotrigine Other (See Comments)     Bruising, aggression           PMH, Surgical Hx, Family Hx, and Social Hx reviewed and updated. PHYSICAL EXAMINATION: N/A. VV Audio   Oriented and conversant   No audible distress                 Other pertinent observable physical exam findings-   Results for orders placed or performed during the hospital encounter of 10/23/21   EKG 12 Lead   Result Value Ref Range    Ventricular Rate 85 BPM    Atrial Rate 85 BPM    P-R Interval 140 ms    QRS Duration 84 ms    Q-T Interval 372 ms    QTc Calculation (Bazett) 442 ms    P Axis 36 degrees    R Axis 65 degrees    T Axis 38 degrees       ASSESSMENT/PLAN:  Assessment & Plan   Kathleen Shaikh was seen today for back pain. Diagnoses and all orders for this visit:    Back pain of lumbar region with sciatica  -     methylPREDNISolone (MEDROL, CRISTA,) 4 MG tablet; Take by mouth. -     XR LUMBAR SPINE (MIN 4 VIEWS);  Future  -     Yadira Sweet MD, [facility]      Orders Placed This Encounter   Procedures    XR LUMBAR SPINE (MIN 4 VIEWS)     Standing Status:   Future     Standing Expiration Date:   3/1/2023     Order Specific Question:   Reason for exam:     Answer:   pain   Yadira Sweet MD, [facility]     Referral Priority:   Routine     Referral Type:   Eval and Treat on your back with your knees bent and supported by large pillows. ? Lie on the floor with your legs on the seat of a sofa or chair. ? Lie on your side with your knees and hips bent and a pillow between your legs. ? Lie on your stomach if it does not make pain worse. 2. Do not sit up in bed, and avoid soft couches and twisted positions. Bed rest can help relieve pain at first, but it delays healing. Avoid bed rest after the first day of back pain. 3. Change positions every 30 minutes. If you must sit for long periods of time, take breaks from sitting. Get up and walk around, or lie in a comfortable position. 4. Try using a heating pad on a low or medium setting for 15 to 20 minutes every 2 or 3 hours. Try a warm shower in place of one session with the heating pad. 5. You can also try an ice pack for 10 to 15 minutes every 2 to 3 hours. Put a thin cloth between the ice pack and your skin. 6. Take pain medicines exactly as directed. ? If the doctor gave you a prescription medicine for pain, take it as prescribed. ? If you are not taking a prescription pain medicine, ask your doctor if you can take an over-the-counter medicine. 7. Take short walks several times a day. You can start with 5 to 10 minutes, 3 or 4 times a day, and work up to longer walks. Walk on level surfaces and avoid hills and stairs until your back is better. 8. Return to work and other activities as soon as you can. Continued rest without activity is usually not good for your back. 9. To prevent future back pain, do exercises to stretch and strengthen your back and stomach. Learn how to use good posture, safe lifting techniques, and proper body mechanics. When should you call for help? Call your doctor now or seek immediate medical care if:    · You have new or worsening numbness in your legs. · You have new or worsening weakness in your legs.  (This could make it hard to stand up.)     · You lose control of your bladder or bowels. Close follow up to evaluate treatment results and for coordination of care. I have reviewed the patient's medical history in detail and updated the computerized patient record. Patient identification was verified at the start of the visit: Yes  Total time spent on this encounter: 20 minutes  >50% of 20 minutes was spent spent on counseling, answering questions, instructions on meds & testing & coordinating the care based on my plan and assessment as noted. --NICO Carrion - NP on 3/1/2022 at 5:56 PM    An electronic signature was used to authenticate this note.

## 2022-03-01 NOTE — TELEPHONE ENCOUNTER
From: Alia Palencia  To: Katherin Done  Sent: 3/1/2022 3:38 PM EST  Subject: Back    I am writing because I have had some back pain for about 2 months. Consistently now and I cannot get it to go away. I've tried [employer] tried ibuprofen Tylenol flexeril a low-dose steroid pack and nothing is seeming to help it help it at all. I don't know what else to do I can barely move I can barely clean myself take a shower I'm having trouble walking and it's all the way at the bottom of my back all the way across and into my right hip.  I do not have a car or a way to get there because I now live in [facility] and I don't know if I can do a virtual appointment or what I need to do with Crystal but something would be greatly appreciated

## 2022-03-22 ENCOUNTER — HOSPITAL ENCOUNTER (OUTPATIENT)
Dept: MRI IMAGING | Age: 37
Discharge: HOME | End: 2022-03-24
Payer: COMMERCIAL

## 2022-03-22 DIAGNOSIS — M17.12 ARTHRITIS OF LEFT KNEE: ICD-10-CM

## 2022-03-22 DIAGNOSIS — M25.569 KNEE PAIN, UNSPECIFIED CHRONICITY, UNSPECIFIED LATERALITY: ICD-10-CM

## 2022-03-22 PROCEDURE — 73721 MRI JNT OF LWR EXTRE W/O DYE: CPT

## 2022-03-23 ENCOUNTER — TELEMEDICINE (OUTPATIENT)
Dept: FAMILY MEDICINE CLINIC | Age: 37
End: 2022-03-23
Payer: COMMERCIAL

## 2022-03-23 DIAGNOSIS — J45.21 MILD INTERMITTENT ASTHMA WITH ACUTE EXACERBATION: Primary | ICD-10-CM

## 2022-03-23 PROCEDURE — 99422 OL DIG E/M SVC 11-20 MIN: CPT | Performed by: STUDENT IN AN ORGANIZED HEALTH CARE EDUCATION/TRAINING PROGRAM

## 2022-03-23 RX ORDER — BIOTIN 5 MG
TABLET ORAL
COMMUNITY
Start: 2022-03-15

## 2022-03-23 RX ORDER — METHYLPREDNISOLONE 4 MG/1
TABLET ORAL
Qty: 1 KIT | Refills: 0 | Status: SHIPPED | OUTPATIENT
Start: 2022-03-23 | End: 2022-03-29

## 2022-03-23 RX ORDER — ALBUTEROL SULFATE 2.5 MG/3ML
SOLUTION RESPIRATORY (INHALATION)
Qty: 360 ML | Refills: 0 | Status: SHIPPED | OUTPATIENT
Start: 2022-03-23

## 2022-03-23 RX ORDER — AZITHROMYCIN 250 MG/1
250 TABLET, FILM COATED ORAL SEE ADMIN INSTRUCTIONS
Qty: 6 TABLET | Refills: 0 | Status: SHIPPED | OUTPATIENT
Start: 2022-03-23 | End: 2022-03-28

## 2022-03-23 RX ORDER — NABUMETONE 750 MG/1
TABLET, FILM COATED ORAL
COMMUNITY
Start: 2022-03-04

## 2022-03-23 RX ORDER — ALPRAZOLAM 0.5 MG/1
TABLET ORAL
COMMUNITY
Start: 2022-03-04

## 2022-03-23 ASSESSMENT — ENCOUNTER SYMPTOMS
WHEEZING: 1
COUGH: 1
SINUS PRESSURE: 0
VOMITING: 0
SHORTNESS OF BREATH: 0
ABDOMINAL PAIN: 0
SORE THROAT: 0

## 2022-03-23 NOTE — PATIENT INSTRUCTIONS
Patient Education        Asthma Attack: Care Instructions  Overview     During an asthma attack, the airways swell and narrow. This makes it hard to breathe. Severe asthma attacks can be dangerous. But you can help prevent these attacks by keeping your asthma under control and treating symptoms before they get bad. Symptoms include being short of breath, having chest tightness, coughing, and wheezing. Noting and treating these symptoms can also help you avoid trips to the emergency room. If you notice any problems or new symptoms, get medical treatment right away. Follow-up care is a key part of your treatment and safety. Be sure to make and go to all appointments, and call your doctor if you are having problems. It's also a good idea to know your test results and keep a list of the medicines you take. How can you care for yourself at home? · Follow your asthma action plan to prevent and treat attacks. If you don't have an asthma action plan, work with your doctor to create one. · Take your asthma medicines exactly as prescribed. Talk to your doctor right away if you have any questions about how to take them. ? Use your quick-relief medicine when you have symptoms of an asthma attack. Some people need to use quick-relief medicine before they exercise to prevent asthma symptoms. Albuterol is a quick-relief medicine that is often used. In some cases, a certain type of controller inhaler is used as a quick-relief medicine. Ask your doctor what to use for quick relief. ? Take your controller medicine. If you have symptoms often, you will likely need to take it every day. Controller medicine usually includes an inhaled corticosteroid. The goal is to prevent problems before they occur. ? If your doctor prescribed corticosteroid pills to use during an attack, take them exactly as prescribed. It may take hours for the pills to work, but they may make the episode shorter and help you breathe better. ?  Keep your quick-relief medicine with you at all times. · Talk to your doctor before using other medicines. Some medicines, such as aspirin, can cause asthma attacks in some people. · If you have a peak flow meter, use it to check how well you are breathing. This can help you predict when an asthma attack is going to occur. Then you can take medicine to prevent the asthma attack or make it less severe. · Do not smoke or allow others to smoke around you. Avoid smoky places. Smoking makes asthma worse. If you need help quitting, talk to your doctor about stop-smoking programs and medicines. These can increase your chances of quitting for good. · Learn what triggers an asthma attack for you, and avoid the triggers when you can. Common triggers include colds, smoke, air pollution, dust, pollen, mold, pets, cockroaches, stress, and cold air. · Avoid colds and the flu. Talk to your doctor about getting a pneumococcal vaccine shot. If you have had one before, ask your doctor if you need a second dose. Get a flu vaccine every fall. If you must be around people with colds or the flu, wash your hands often. When should you call for help? Call 911 anytime you think you may need emergency care. For example, call if:    · You have severe trouble breathing. Call your doctor now or seek immediate medical care if:    · Your symptoms do not get better after you have followed your asthma action plan.     · You have new or worse trouble breathing.     · Your coughing and wheezing get worse.     · You cough up dark brown or bloody mucus (sputum).     · You have a new or higher fever.    Watch closely for changes in your health, and be sure to contact your doctor if:    · You need to use quick-relief medicine on more than 2 days a week within a month (unless it is just for exercise).     · You cough more deeply or more often, especially if you notice more mucus or a change in the color of your mucus.     · You are not getting better as expected. Where can you learn more? Go to https://[email or web address].[email or web address]. org and sign in to your [employer] account. Enter O166 in the [employer] box to learn more about \"Asthma Attack: Care Instructions. \"     If you do not have an account, please click on the \"Sign Up Now\" link. Current as of: July 6, 2021               Content Version: 13.1  © 2006-2021 [employer]wise[employer] Incorporated. Care instructions adapted under license by [facility] ([facility]). If you have questions about a medical condition or this instruction, always ask your healthcare professional. [address] any warranty or liability for your use of this information.

## 2022-03-23 NOTE — PROGRESS NOTES
This visit began at 8:50am    Location of the visit was the [facility] primary care site. TELEHEALTH APPOINTMENT  Patient has been screened to determine that this visit qualifies for a \"Telephone Visit\". Patient is currently established with the current medical practice, the condition being reviewed was not addressed within the previous 7 days and is not likely be determined to need a procedure within the next 24 hours. This visit was via telephone due to the restrictions of the COVID-19 pandemic. All issues as below were discussed and addressed but no physical exam was performed. It was felt the patient should be evaluated in the clinic there will be comment below demonstrating they were directed there. The patient is aware and has given verbal consent to be billed for this telephone encounter. Patient Name: Dakota Covert  MRN: [id number]    Chief Complaint   Patient presents with    Cough     states that cough started over a week ago, has tried OTC meds with no relief. no other symptoms. states that it flares her asthma a little.      HPI  URI  Symptoms started one week ago  Initially thought symptoms were related to her allergies  Endorsing cough and wheezing  Denied fevers, chills, SOB, GI symptoms  Hx asthma and has been using her albuterol 4-5 times a day      PMH:  Current Outpatient Medications on File Prior to Visit   Medication Sig Dispense Refill    ALPRAZolam (XANAX) 0.5 MG tablet TAKE 1 TABLET BY MOUTH THREE TIMES DAILY AS NEEDED      Biotin 5 MG TABS Take by mouth      nabumetone (RELAFEN) 750 MG tablet TAKE 1 TABLET BY MOUTH TWICE DAILY      doxepin (SINEQUAN) 75 MG capsule Take one (1) capsule by mouth at bedtime      paliperidone (INVEGA) 3 MG extended release tablet Take one (1) tablet by mouth at bedtime      levocetirizine (XYZAL) 5 MG tablet Take 1 tablet by mouth nightly 30 tablet 3    pantoprazole (PROTONIX) 40 MG tablet Take 1 tablet by mouth 2 times daily 60 tablet 3    metoprolol tartrate (LOPRESSOR) 50 MG tablet Take 1 tablet by mouth 2 times daily 60 tablet 5    cyclobenzaprine (FLEXERIL) 10 MG tablet Take 1 tablet by mouth 3 times daily as needed for Muscle spasms 30 tablet 1    promethazine (PHENERGAN) 25 MG tablet Take 1 tablet by mouth every 8 hours as needed for Nausea 30 tablet 0    lisinopril (PRINIVIL;ZESTRIL) 20 MG tablet Take 1 tablet by mouth daily 30 tablet 3    albuterol sulfate HFA (VENTOLIN HFA) 108 (90 Base) MCG/ACT inhaler Inhale 2 puffs into the lungs 4 times daily as needed for Wheezing 3 Inhaler 1    venlafaxine (EFFEXOR XR) 75 MG extended release capsule TAKE 1 CAPSULE EVERY EVENING      venlafaxine (EFFEXOR XR) 150 MG extended release capsule       dicyclomine (BENTYL) 20 MG tablet Take 1 tablet by mouth every 6 hours 120 tablet 3    docusate sodium (DOK) 100 MG capsule Take 1 capsule by mouth 2 times daily as needed for Constipation 60 capsule 3     No current facility-administered medications on file prior to visit.      Past Medical History:   Diagnosis Date    Anxiety     Asthma     CAD (coronary artery disease)     Tachycardia    Depression 10/29/2013    Dyslipidemia     Elevated blood pressure     Fibromyalgia     Fingernail abnormalities     right middle fingernail deformity    Generalized anxiety disorder     GERD (gastroesophageal reflux disease)     HPV (human papillomavirus)     Hypertension     IBS (irritable bowel syndrome) 2012    Migraine     Obesity     Pap smear for cervical cancer screening     - normal, pcos    PCOS (polycystic ovarian syndrome)     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[facility] [address]     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     Past Surgical History:   Procedure Laterality Date    ARM SURGERY Left     ulna shortening    CARPAL TUNNEL RELEASE Bilateral      SECTION      CHOLECYSTECTOMY      LUMBAR [place] SURGERY  10/10/2018    DR Shelby [place]    [place] EGD TRANSORAL BIOPSY SINGLE/MULTIPLE N/A 2/15/2018    EGD ESOPHAGOGASTRODUODENOSCOPY performed by Anca George MD at [address] ARTHROSCOPY Left     SHOULDER ARTHROSCOPY Left 11/01/2017    Krysta Arriaga MD     WRIST ARTHROSCOPY Left      Social History     Socioeconomic History    Marital status:      Spouse name: Not on file    Number of children: Not on file    Years of education: Not on file    Highest education level: Not on file   Occupational History    Not on file   Tobacco Use    Smoking status: Current Every Day Smoker     Packs/day: 0.30     Years: 17.00     Pack years: 5.10     Types: Cigarettes    Smokeless tobacco: Never Used   Substance and Sexual Activity    Alcohol use: No    Drug use: No    Sexual activity: Never   Other Topics Concern    Not on file   Social History Narrative    Not on file     Social Determinants of Health     Financial Resource Strain: Low Risk     Difficulty of Paying Living Expenses: Not hard at all   Food Insecurity: No Food Insecurity    Worried About Running Out of Food in the Last Year: Never true    Say of Food in the Last Year: Never true   Transportation Needs: Unmet Transportation Needs    Lack of Transportation (Medical):  Yes    Lack of Transportation (Non-Medical): Yes   Physical Activity:     Days of Exercise per Week: Not on file    Minutes of Exercise per Session: Not on file   Stress:     Feeling of Stress : Not on file   Social Connections:     Frequency of Communication with Friends and Family: Not on file    Frequency of Social Gatherings with Friends and Family: Not on file    Attends Temple Services: Not on file    Active Member of Clubs or Organizations: Not on file    Attends Club or Organization Meetings: Not on file    Marital Status: Not on file   Intimate Partner Violence:     Fear of Current or Ex-Partner: Not on file    Emotionally Abused: Not on file    Physically Abused: Not on file    Sexually Abused: Not on file   Housing Stability:     Unable to Pay for Housing in the Last Year: Not on file    Number of Places Lived in the Last Year: Not on file    Unstable Housing in the Last Year: Not on file     Family History   Problem Relation Age of Onset    Arthritis Mother     Depression Mother     Depression Father     Diabetes Other     Cancer Other     Stroke Other     Obesity Other     Diabetes Maternal Aunt     Diabetes Maternal Uncle     Cancer Maternal Grandmother         colon    Cancer Paternal Grandfather      Allergies: Iodides and Lamotrigine    Review of Systems   Constitutional: Negative for chills and fever. HENT: Negative for congestion, sinus pressure and sore throat. Respiratory: Positive for cough and wheezing. Negative for shortness of breath. Cardiovascular: Negative for chest pain and palpitations. Gastrointestinal: Negative for abdominal pain and vomiting. Musculoskeletal: Negative for arthralgias and myalgias. Skin: Negative for rash and wound. Neurological: Negative for speech difficulty and light-headedness. Psychiatric/Behavioral: Negative for suicidal ideas. The patient is not nervous/anxious. PHYSICAL EXAM/ RESULTS    There were no vitals taken for this visit. Vitals signs: pt does not have the proper equipment to take all VS.    The physical is not performed due to the visit being a telephone counter as indicated due to the restrictions of the COVID-19 pandemic    No results found for this or any previous visit (from the past 2016 hour(s)). Assessment:       Diagnosis Orders   1. Mild intermittent asthma with acute exacerbation  azithromycin (ZITHROMAX) 250 MG tablet    methylPREDNISolone (MEDROL DOSEPACK) 4 MG tablet    albuterol (PROVENTIL) (2.5 MG/3ML) 0.083% nebulizer solution  ER precautions given         No orders of the defined types were placed in this encounter. Plan:   Return if symptoms worsen or fail to improve.     Patient Instructions Patient Education        Asthma Attack: Care Instructions  Overview     During an asthma attack, the airways swell and narrow. This makes it hard to breathe. Severe asthma attacks can be dangerous. But you can help prevent these attacks by keeping your asthma under control and treating symptoms before they get bad. Symptoms include being short of breath, having chest tightness, coughing, and wheezing. Noting and treating these symptoms can also help you avoid trips to the emergency room. If you notice any problems or new symptoms, get medical treatment right away. Follow-up care is a key part of your treatment and safety. Be sure to make and go to all appointments, and call your doctor if you are having problems. It's also a good idea to know your test results and keep a list of the medicines you take. How can you care for yourself at home? · Follow your asthma action plan to prevent and treat attacks. If you don't have an asthma action plan, work with your doctor to create one. · Take your asthma medicines exactly as prescribed. Talk to your doctor right away if you have any questions about how to take them. ? Use your quick-relief medicine when you have symptoms of an asthma attack. Some people need to use quick-relief medicine before they exercise to prevent asthma symptoms. Albuterol is a quick-relief medicine that is often used. In some cases, a certain type of controller inhaler is used as a quick-relief medicine. Ask your doctor what to use for quick relief. ? Take your controller medicine. If you have symptoms often, you will likely need to take it every day. Controller medicine usually includes an inhaled corticosteroid. The goal is to prevent problems before they occur. ? If your doctor prescribed corticosteroid pills to use during an attack, take them exactly as prescribed. It may take hours for the pills to work, but they may make the episode shorter and help you breathe better. ?  Keep your quick-relief medicine with you at all times. · Talk to your doctor before using other medicines. Some medicines, such as aspirin, can cause asthma attacks in some people. · If you have a peak flow meter, use it to check how well you are breathing. This can help you predict when an asthma attack is going to occur. Then you can take medicine to prevent the asthma attack or make it less severe. · Do not smoke or allow others to smoke around you. Avoid smoky places. Smoking makes asthma worse. If you need help quitting, talk to your doctor about stop-smoking programs and medicines. These can increase your chances of quitting for good. · Learn what triggers an asthma attack for you, and avoid the triggers when you can. Common triggers include colds, smoke, air pollution, dust, pollen, mold, pets, cockroaches, stress, and cold air. · Avoid colds and the flu. Talk to your doctor about getting a pneumococcal vaccine shot. If you have had one before, ask your doctor if you need a second dose. Get a flu vaccine every fall. If you must be around people with colds or the flu, wash your hands often. When should you call for help? Call 911 anytime you think you may need emergency care. For example, call if:    · You have severe trouble breathing. Call your doctor now or seek immediate medical care if:    · Your symptoms do not get better after you have followed your asthma action plan.     · You have new or worse trouble breathing.     · Your coughing and wheezing get worse.     · You cough up dark brown or bloody mucus (sputum).     · You have a new or higher fever.    Watch closely for changes in your health, and be sure to contact your doctor if:    · You need to use quick-relief medicine on more than 2 days a week within a month (unless it is just for exercise).     · You cough more deeply or more often, especially if you notice more mucus or a change in the color of your mucus.     · You are not getting better as expected. Where can you learn more? Go to [email or web address] and sign in to your [facility] [employer] account. Enter Q559 in the [employer] box to learn more about \"Asthma Attack: Care Instructions. \"     If you do not have an account, please click on the \"Sign Up Now\" link. Current as of: July 6, 2021               Content Version: 13.1  © 2006-2021 [employer]wise[employer] Incorporated. Care instructions adapted under license by [facility] ([facility]). If you have questions about a medical condition or this instruction, always ask your healthcare professional. [place] [address] any warranty or liability for your use of this information.              This visit ended at 9:01am    Mary Vogt, DO

## 2022-03-27 ENCOUNTER — PATIENT MESSAGE (OUTPATIENT)
Dept: FAMILY MEDICINE CLINIC | Age: 37
End: 2022-03-27

## 2022-03-27 NOTE — TELEPHONE ENCOUNTER
From: Caleb Obregon  To: Mario Gardner  Sent: 3/27/2022 1:57 PM EDT  Subject: Sick    I have been nauseous off and on all day everyday for 2 weeks now. I can't barely eat anything. I'm afraid to go anywhere because I always feel sick to my stomach. I don't know what's going on. I don't know if I need blood work or what but I can't take it anymore. The promethazine I take helps for a bit then it comes back strong as ever.

## 2022-03-28 ENCOUNTER — TELEMEDICINE (OUTPATIENT)
Dept: FAMILY MEDICINE CLINIC | Age: 37
End: 2022-03-28
Payer: COMMERCIAL

## 2022-03-28 DIAGNOSIS — R11.0 NAUSEA: Primary | ICD-10-CM

## 2022-03-28 DIAGNOSIS — M79.7 FIBROMYALGIA: ICD-10-CM

## 2022-03-28 DIAGNOSIS — N91.2 AMENORRHEA: ICD-10-CM

## 2022-03-28 DIAGNOSIS — N94.89 PAIN OF OVARY: ICD-10-CM

## 2022-03-28 DIAGNOSIS — D68.9 COAGULOPATHY (HCC): ICD-10-CM

## 2022-03-28 PROCEDURE — 99442 PR PHYS/QHP TELEPHONE EVALUATION 11-20 MIN: CPT | Performed by: FAMILY MEDICINE

## 2022-03-28 RX ORDER — PROMETHAZINE HYDROCHLORIDE 25 MG/1
25 TABLET ORAL EVERY 8 HOURS PRN
Qty: 30 TABLET | Refills: 0 | Status: SHIPPED | OUTPATIENT
Start: 2022-03-28 | End: 2022-04-27 | Stop reason: SDUPTHER

## 2022-03-28 NOTE — PROGRESS NOTES
3/28/2022    TELEHEALTH EVALUATION -- Audio/Visual (During FSAJQ-00 public health emergency)    Due to Mariela 19 outbreak, patient's office visit was converted to a virtual visit. Patient was contacted and agreed to proceed with a virtual visit via Telephone Visit  The risks and benefits of converting to a virtual visit were discussed in light of the current infectious disease epidemic. Patient also understood that insurance coverage and co-pays are up to their individual insurance plans.     HPI:    Karen Hines (:  1985) has requested an audio/video evaluation for the following concern(s):  Chief Complaint   Patient presents with    Nausea     x 2 weeks, off  and on all day, denied diarrhea, vomiting, doesn't matter what she eats, sometimes can't eat, week late on period       Nauseated on and off for the past 2 weeks  She is a week late on her period   She has IBS  Has seen GI in past  S/p joey at age 25    No diarrhea   No fever   Low appetite    She has an order for a pelvic u/s     Patient Active Problem List   Diagnosis    Asthma    Generalized anxiety disorder    GERD (gastroesophageal reflux disease)    Migraine    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[address]    PCOS (polycystic ovarian syndrome)    Elevated blood pressure reading    Human papilloma virus infection    Dyslipidemia    Obesity    Malabsorption syndrome    Family history of cancer    IBS (irritable bowel syndrome)    Status post cholecystectomy    Nicotine dependence    Vertigo    Depression    Coagulopathy ([address]    Fibromyalgia     Past Medical History:   Diagnosis Date    Anxiety     Asthma     CAD (coronary artery disease)     Tachycardia    Depression 10/29/2013    Dyslipidemia     Elevated blood pressure     Fibromyalgia     Fingernail abnormalities     right middle fingernail deformity    Generalized anxiety disorder     GERD (gastroesophageal reflux disease)     HPV (human papillomavirus)     Hypertension     IBS (irritable bowel syndrome) 7/31/2012    Migraine     Obesity     Pap smear for cervical cancer screening     2009- normal, pcos    PCOS (polycystic ovarian syndrome)     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[facility] [address]     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  Authorizing Provider   promethazine (PHENERGAN) 25 MG tablet Take 1 tablet by mouth every 8 hours as needed for Nausea Yes Georgi Kussmaul, MD   ALPRAZolam (XANAX) 0.5 MG tablet TAKE 1 TABLET BY MOUTH THREE TIMES DAILY AS NEEDED Yes Historical Provider, MD   Biotin 5 MG TABS Take by mouth Yes Historical Provider, MD   nabumetone (RELAFEN) 750 MG tablet TAKE 1 TABLET BY MOUTH TWICE DAILY Yes Historical Provider, MD   albuterol (PROVENTIL) (2.5 MG/3ML) 0.083% nebulizer solution USE 1 VIAL PER NEBULIZER FOUR TIMES A DAY AS NEEDED FOR SHORTNESS OF BREATH Yes Yehuda Rangel DO   doxepin (SINEQUAN) 75 MG capsule Take one (1) capsule by mouth at bedtime Yes Historical Provider, MD   paliperidone (INVEGA) 3 MG extended release tablet Take one (1) tablet by mouth at bedtime Yes Historical Provider, MD   levocetirizine (XYZAL) 5 MG tablet Take 1 tablet by mouth nightly Yes NICO Zuniga CNP   pantoprazole (PROTONIX) 40 MG tablet Take 1 tablet by mouth 2 times daily Yes NICO Zuniga CNP   metoprolol tartrate (LOPRESSOR) 50 MG tablet Take 1 tablet by mouth 2 times daily Yes NICO Zuniga CNP   lisinopril (PRINIVIL;ZESTRIL) 20 MG tablet Take 1 tablet by mouth daily Yes NICO Zuniga CNP   albuterol sulfate HFA (VENTOLIN HFA) 108 (90 Base) MCG/ACT inhaler Inhale 2 puffs into the lungs 4 times daily as needed for Wheezing Yes NICO Zuniga CNP   venlafaxine (EFFEXOR XR) 75 MG extended release capsule TAKE 1 CAPSULE EVERY EVENING Yes Historical Provider, MD   venlafaxine (EFFEXOR XR) 150 MG extended release capsule  Yes Svitlana Darling   dicyclomine (BENTYL) 20 MG tablet Take 1 tablet by mouth every 6 hours Yes NICO Leavitt CNP   docusate sodium (DOK) 100 MG capsule Take 1 capsule by mouth 2 times daily as needed for Constipation Yes NICO Leavitt CNP   azithromycin (ZITHROMAX) 250 MG tablet Take 1 tablet by mouth See Admin Instructions for 5 days 500mg on day 1 followed by 250mg on days 2 - 5  Patient not taking: Reported on 3/28/2022  Sulaiman Hopkins DO   methylPREDNISolone (MEDROL DOSEPACK) 4 MG tablet Take by mouth. Patient not taking: Reported on 3/28/2022  Sulaiman Hopkins DO   cyclobenzaprine (FLEXERIL) 10 MG tablet Take 1 tablet by mouth 3 times daily as needed for Muscle spasms  Patient not taking: Reported on 3/28/2022  NICO Ansari CNP       Social History     Tobacco Use    Smoking status: Current Every Day Smoker     Packs/day: 0.30     Years: 17.00     Pack years: 5.10     Types: Cigarettes    Smokeless tobacco: Never Used   Substance Use Topics    Alcohol use: No    Drug use: No            PHYSICAL EXAMINATION:  Patient-Reported Vitals 3/28/2022   Patient-Reported Weight 320 lb   Patient-Reported Height 5'7   Patient-Reported Systolic -   Patient-Reported Diastolic -   Patient-Reported Pulse -   Patient-Reported Temperature -         No exam  Phone visit  Patient in no significant distress, conversant    Due to this being a TeleHealth encounter, evaluation of the following organ systems is limited: Vitals/Constitutional/EENT/Resp/CV/GI//MS/Neuro/Skin/Heme-Lymph-Imm.     Lab Results   Component Value Date    WBC 10.5 03/06/2020    HGB 14.1 03/06/2020    HCT 42.7 03/06/2020     03/06/2020    CHOL 198 08/15/2019    TRIG 160 (H) 08/15/2019    HDL 37 (L) 08/15/2019    ALT 17 03/06/2020    AST 22 03/06/2020     03/06/2020    K 3.9 03/06/2020     03/06/2020    CREATININE 0.81 03/06/2020    BUN 9 03/06/2020    CO2 22 03/06/2020    TSH 1.540 07/28/2015    INR 0.9 07/28/2015    LABA1C 5.1 01/24/2019         ASSESSMENT/PLAN:     Diagnosis Orders   1. Nausea  CBC    Comprehensive Metabolic Panel    TSH    promethazine (PHENERGAN) 25 MG tablet    Urinalysis with Reflex to Culture   2. Coagulopathy ([address]     3. Amenorrhea  HCG, Quantitative, Pregnancy   4. Fibromyalgia     5. Pain of ovary       She has order for pelvic u/s  Labs as ordered  Phenergan  May need to return to GI      No follow-ups on file. An  electronic signature was used to authenticate this note. --Deborah Deluca MD on 3/28/2022 at 4:00 PM        Pursuant to the emergency declaration under the [facility] waiver authority and the [employer] Act, this Virtual  Visit was conducted, with patient's consent, to reduce the patient's risk of exposure to COVID-19 and provide continuity of care for an established patient.     11 minute call

## 2022-03-29 ENCOUNTER — HOSPITAL ENCOUNTER (OUTPATIENT)
Dept: LAB | Age: 37
Discharge: HOME | End: 2022-03-29
Payer: COMMERCIAL

## 2022-03-29 DIAGNOSIS — R11.0 NAUSEA: ICD-10-CM

## 2022-03-29 DIAGNOSIS — N91.2 AMENORRHEA: ICD-10-CM

## 2022-03-29 LAB
ALBUMIN SERPL-MCNC: 3.8 G/DL (ref 3.5–4.6)
ALP BLD-CCNC: 84 U/L (ref 40–130)
ALT SERPL-CCNC: 9 U/L (ref 0–33)
ANION GAP SERPL CALCULATED.3IONS-SCNC: 15 MEQ/L (ref 9–15)
AST SERPL-CCNC: 10 U/L (ref 0–35)
BILIRUB SERPL-MCNC: <0.2 MG/DL (ref 0.2–0.7)
BILIRUBIN URINE: NEGATIVE
BLOOD, URINE: NEGATIVE
BUN BLDV-MCNC: 9 MG/DL (ref 6–20)
CALCIUM SERPL-MCNC: 9.2 MG/DL (ref 8.5–9.9)
CHLORIDE BLD-SCNC: 103 MEQ/L (ref 95–107)
CLARITY: CLEAR
CO2: 18 MEQ/L (ref 20–31)
COLOR: YELLOW
CREAT SERPL-MCNC: 0.87 MG/DL (ref 0.5–0.9)
GFR AFRICAN AMERICAN: >60
GFR NON-AFRICAN AMERICAN: >60
GLOBULIN: 3.9 G/DL (ref 2.3–3.5)
GLUCOSE BLD-MCNC: 84 MG/DL (ref 70–99)
GLUCOSE URINE: NEGATIVE MG/DL
GONADOTROPIN, CHORIONIC (HCG) QUANT: <0.1 MIU/ML
HCT VFR BLD CALC: 43.2 % (ref 37–47)
HEMOGLOBIN: 13.8 G/DL (ref 12–16)
KETONES, URINE: ABNORMAL MG/DL
LEUKOCYTE ESTERASE, URINE: NEGATIVE
MCH RBC QN AUTO: 27 PG (ref 27–31.3)
MCHC RBC AUTO-ENTMCNC: 32 % (ref 33–37)
MCV RBC AUTO: 84.3 FL (ref 82–100)
NITRITE, URINE: NEGATIVE
PDW BLD-RTO: 15.9 % (ref 11.5–14.5)
PH UA: 6 (ref 5–9)
PLATELET # BLD: 387 K/UL (ref 130–400)
POTASSIUM SERPL-SCNC: 3.9 MEQ/L (ref 3.4–4.9)
PROTEIN UA: NEGATIVE MG/DL
RBC # BLD: 5.12 M/UL (ref 4.2–5.4)
SODIUM BLD-SCNC: 136 MEQ/L (ref 135–144)
SPECIFIC GRAVITY UA: 1.03 (ref 1–1.03)
TOTAL PROTEIN: 7.7 G/DL (ref 6.3–8)
TSH SERPL DL<=0.05 MIU/L-ACNC: 0.3 UIU/ML (ref 0.44–3.86)
URINE REFLEX TO CULTURE: ABNORMAL
UROBILINOGEN, URINE: 1 E.U./DL
WBC # BLD: 15.9 K/UL (ref 4.8–10.8)

## 2022-03-29 PROCEDURE — 84702 CHORIONIC GONADOTROPIN TEST: CPT

## 2022-03-29 PROCEDURE — 84443 ASSAY THYROID STIM HORMONE: CPT

## 2022-03-29 PROCEDURE — 81003 URINALYSIS AUTO W/O SCOPE: CPT

## 2022-03-29 PROCEDURE — 85027 COMPLETE CBC AUTOMATED: CPT

## 2022-03-29 PROCEDURE — 84439 ASSAY OF FREE THYROXINE: CPT

## 2022-03-29 PROCEDURE — 80053 COMPREHEN METABOLIC PANEL: CPT

## 2022-03-29 PROCEDURE — 36415 COLL VENOUS BLD VENIPUNCTURE: CPT

## 2022-03-30 ENCOUNTER — TELEPHONE (OUTPATIENT)
Dept: FAMILY MEDICINE CLINIC | Age: 37
End: 2022-03-30

## 2022-03-30 DIAGNOSIS — R79.89 LOW TSH LEVEL: Primary | ICD-10-CM

## 2022-03-30 LAB
REASON FOR REJECTION: NORMAL
REJECTED TEST: NORMAL
T4 FREE: 1.21 NG/DL (ref 0.84–1.68)

## 2022-03-30 NOTE — TELEPHONE ENCOUNTER
Spoke to lab who states that T3 free will be rejected. Not enough sample. LMTCB for patient for redraw.

## 2022-04-01 DIAGNOSIS — D72.829 LEUKOCYTOSIS, UNSPECIFIED TYPE: Primary | ICD-10-CM

## 2022-04-27 DIAGNOSIS — J45.21 MILD INTERMITTENT ASTHMA WITH ACUTE EXACERBATION: ICD-10-CM

## 2022-04-27 DIAGNOSIS — I10 ESSENTIAL HYPERTENSION: ICD-10-CM

## 2022-04-27 DIAGNOSIS — M79.7 FIBROMYALGIA: ICD-10-CM

## 2022-04-27 DIAGNOSIS — K21.9 GASTROESOPHAGEAL REFLUX DISEASE WITHOUT ESOPHAGITIS: ICD-10-CM

## 2022-04-27 DIAGNOSIS — J30.9 ALLERGIC RHINITIS, UNSPECIFIED SEASONALITY, UNSPECIFIED TRIGGER: ICD-10-CM

## 2022-04-27 DIAGNOSIS — J45.31 MILD PERSISTENT ASTHMA WITH EXACERBATION: ICD-10-CM

## 2022-04-27 DIAGNOSIS — R11.0 NAUSEA: ICD-10-CM

## 2022-04-28 NOTE — TELEPHONE ENCOUNTER
Requesting medication refill. Please approve or deny this request.    Rx requested:  Requested Prescriptions     Pending Prescriptions Disp Refills    albuterol (PROVENTIL) (2.5 MG/3ML) 0.083% nebulizer solution 360 mL 0     Sig: USE 1 VIAL PER NEBULIZER FOUR TIMES A DAY AS NEEDED FOR SHORTNESS OF BREATH       Last Office Visit:   3/23/2022    Next Visit Date:  No future appointments. Looks like pt is always seen for acute issues. When would you like pt to follow up?

## 2022-04-29 RX ORDER — ALBUTEROL SULFATE 2.5 MG/3ML
SOLUTION RESPIRATORY (INHALATION)
Qty: 360 ML | Refills: 0 | OUTPATIENT
Start: 2022-04-29

## 2022-04-29 RX ORDER — ALBUTEROL SULFATE 90 UG/1
2 AEROSOL, METERED RESPIRATORY (INHALATION) 4 TIMES DAILY PRN
Qty: 1 EACH | Refills: 0 | Status: SHIPPED | OUTPATIENT
Start: 2022-04-29 | End: 2022-07-08 | Stop reason: SDUPTHER

## 2022-04-29 RX ORDER — LISINOPRIL 20 MG/1
20 TABLET ORAL DAILY
Qty: 30 TABLET | Refills: 0 | Status: SHIPPED | OUTPATIENT
Start: 2022-04-29 | End: 2022-07-08 | Stop reason: SDUPTHER

## 2022-04-29 RX ORDER — METOPROLOL TARTRATE 50 MG/1
50 TABLET, FILM COATED ORAL 2 TIMES DAILY
Qty: 60 TABLET | Refills: 0 | Status: SHIPPED | OUTPATIENT
Start: 2022-04-29 | End: 2022-07-08 | Stop reason: SDUPTHER

## 2022-04-29 RX ORDER — CYCLOBENZAPRINE HCL 10 MG
10 TABLET ORAL 3 TIMES DAILY PRN
Qty: 30 TABLET | Refills: 0 | Status: SHIPPED | OUTPATIENT
Start: 2022-04-29

## 2022-04-29 RX ORDER — PROMETHAZINE HYDROCHLORIDE 25 MG/1
25 TABLET ORAL EVERY 8 HOURS PRN
Qty: 30 TABLET | Refills: 0 | Status: SHIPPED | OUTPATIENT
Start: 2022-04-29 | End: 2022-07-08 | Stop reason: SDUPTHER

## 2022-04-29 RX ORDER — PANTOPRAZOLE SODIUM 40 MG/1
40 TABLET, DELAYED RELEASE ORAL 2 TIMES DAILY
Qty: 60 TABLET | Refills: 0 | Status: SHIPPED | OUTPATIENT
Start: 2022-04-29 | End: 2022-07-08 | Stop reason: SDUPTHER

## 2022-04-29 RX ORDER — LEVOCETIRIZINE DIHYDROCHLORIDE 5 MG/1
5 TABLET, FILM COATED ORAL NIGHTLY
Qty: 30 TABLET | Refills: 0 | Status: SHIPPED | OUTPATIENT
Start: 2022-04-29

## 2022-05-01 RX ORDER — DOCUSATE SODIUM 100 MG/1
100 CAPSULE, LIQUID FILLED ORAL 2 TIMES DAILY PRN
Qty: 60 CAPSULE | Refills: 3 | Status: SHIPPED | OUTPATIENT
Start: 2022-05-01 | End: 2022-07-08 | Stop reason: SDUPTHER

## 2022-05-01 RX ORDER — DICYCLOMINE HCL 20 MG
20 TABLET ORAL EVERY 6 HOURS
Qty: 120 TABLET | Refills: 0 | Status: SHIPPED | OUTPATIENT
Start: 2022-05-01 | End: 2022-07-11 | Stop reason: SDUPTHER

## 2022-07-08 ENCOUNTER — OFFICE VISIT (OUTPATIENT)
Dept: FAMILY MEDICINE CLINIC | Age: 37
End: 2022-07-08
Payer: COMMERCIAL

## 2022-07-08 VITALS
BODY MASS INDEX: 54.03 KG/M2 | SYSTOLIC BLOOD PRESSURE: 114 MMHG | DIASTOLIC BLOOD PRESSURE: 88 MMHG | TEMPERATURE: 98 F | WEIGHT: 293 LBS | OXYGEN SATURATION: 98 % | HEART RATE: 85 BPM

## 2022-07-08 DIAGNOSIS — M25.551 CHRONIC HIP PAIN, RIGHT: Primary | ICD-10-CM

## 2022-07-08 DIAGNOSIS — R11.0 NAUSEA: ICD-10-CM

## 2022-07-08 DIAGNOSIS — G89.29 CHRONIC HIP PAIN, RIGHT: Primary | ICD-10-CM

## 2022-07-08 DIAGNOSIS — J45.31 MILD PERSISTENT ASTHMA WITH EXACERBATION: ICD-10-CM

## 2022-07-08 DIAGNOSIS — I10 ESSENTIAL HYPERTENSION: ICD-10-CM

## 2022-07-08 DIAGNOSIS — K21.9 GASTROESOPHAGEAL REFLUX DISEASE WITHOUT ESOPHAGITIS: ICD-10-CM

## 2022-07-08 PROCEDURE — G8417 CALC BMI ABV UP PARAM F/U: HCPCS | Performed by: NURSE PRACTITIONER

## 2022-07-08 PROCEDURE — 99214 OFFICE O/P EST MOD 30 MIN: CPT | Performed by: NURSE PRACTITIONER

## 2022-07-08 PROCEDURE — G8427 DOCREV CUR MEDS BY ELIG CLIN: HCPCS | Performed by: NURSE PRACTITIONER

## 2022-07-08 PROCEDURE — 4004F PT TOBACCO SCREEN RCVD TLK: CPT | Performed by: NURSE PRACTITIONER

## 2022-07-08 RX ORDER — ALBUTEROL SULFATE 90 UG/1
2 AEROSOL, METERED RESPIRATORY (INHALATION) 4 TIMES DAILY PRN
Qty: 1 EACH | Refills: 0 | Status: SHIPPED | OUTPATIENT
Start: 2022-07-08

## 2022-07-08 RX ORDER — METOPROLOL TARTRATE 50 MG/1
50 TABLET, FILM COATED ORAL 2 TIMES DAILY
Qty: 180 TABLET | Refills: 1 | Status: SHIPPED | OUTPATIENT
Start: 2022-07-08

## 2022-07-08 RX ORDER — PROMETHAZINE HYDROCHLORIDE 25 MG/1
25 TABLET ORAL EVERY 8 HOURS PRN
Qty: 30 TABLET | Refills: 0 | Status: SHIPPED | OUTPATIENT
Start: 2022-07-08

## 2022-07-08 RX ORDER — LISINOPRIL 20 MG/1
20 TABLET ORAL DAILY
Qty: 30 TABLET | Refills: 5 | Status: SHIPPED | OUTPATIENT
Start: 2022-07-08

## 2022-07-08 RX ORDER — MELOXICAM 15 MG/1
TABLET ORAL
Qty: 90 TABLET | Refills: 0 | Status: SHIPPED | OUTPATIENT
Start: 2022-07-08

## 2022-07-08 RX ORDER — DOCUSATE SODIUM 100 MG/1
100 CAPSULE, LIQUID FILLED ORAL 2 TIMES DAILY PRN
Qty: 180 CAPSULE | Refills: 0 | Status: SHIPPED | OUTPATIENT
Start: 2022-07-08

## 2022-07-08 RX ORDER — TIZANIDINE 2 MG/1
TABLET ORAL
COMMUNITY
Start: 2022-05-27

## 2022-07-08 RX ORDER — MELOXICAM 15 MG/1
TABLET ORAL
COMMUNITY
Start: 2022-05-27 | End: 2022-07-08 | Stop reason: SDUPTHER

## 2022-07-08 RX ORDER — GABAPENTIN 300 MG/1
CAPSULE ORAL
COMMUNITY
Start: 2022-05-02

## 2022-07-08 RX ORDER — FLUOXETINE HYDROCHLORIDE 20 MG/1
CAPSULE ORAL
COMMUNITY
Start: 2022-05-02

## 2022-07-08 RX ORDER — PANTOPRAZOLE SODIUM 40 MG/1
40 TABLET, DELAYED RELEASE ORAL 2 TIMES DAILY
Qty: 180 TABLET | Refills: 1 | Status: SHIPPED | OUTPATIENT
Start: 2022-07-08

## 2022-07-08 SDOH — ECONOMIC STABILITY: FOOD INSECURITY: WITHIN THE PAST 12 MONTHS, YOU WORRIED THAT YOUR FOOD WOULD RUN OUT BEFORE YOU GOT MONEY TO BUY MORE.: NEVER TRUE

## 2022-07-08 SDOH — ECONOMIC STABILITY: FOOD INSECURITY: WITHIN THE PAST 12 MONTHS, THE FOOD YOU BOUGHT JUST DIDN'T LAST AND YOU DIDN'T HAVE MONEY TO GET MORE.: NEVER TRUE

## 2022-07-08 ASSESSMENT — ENCOUNTER SYMPTOMS
COUGH: 0
SHORTNESS OF BREATH: 0
EYE PAIN: 0
DIARRHEA: 0
COLOR CHANGE: 0
ABDOMINAL PAIN: 0
BACK PAIN: 0
TROUBLE SWALLOWING: 0
CHEST TIGHTNESS: 0
CONSTIPATION: 0

## 2022-07-08 ASSESSMENT — SOCIAL DETERMINANTS OF HEALTH (SDOH): HOW HARD IS IT FOR YOU TO PAY FOR THE VERY BASICS LIKE FOOD, HOUSING, MEDICAL CARE, AND HEATING?: VERY HARD

## 2022-07-08 NOTE — PROGRESS NOTES
Subjective  Chief Complaint   Patient presents with    Hypertension     follow up, been a long time since having a med check    Hip Pain     right hip pain, feels like a slipped disk       Hypertension  This is a chronic problem. The current episode started more than 1 year ago. The problem is unchanged. The problem is controlled. Pertinent negatives include no chest pain, malaise/fatigue, palpitations, peripheral edema or shortness of breath. Risk factors for coronary artery disease include obesity. Past treatments include ACE inhibitors and beta blockers. The current treatment provides significant improvement. There are no compliance problems. There is no history of CAD/MI, heart failure or PVD. Following with [facility] for psychiatry. Taking prozac and invega which works well for her. Takes doxepin for insomnia. GERD-well controlled with protonix. \"miserable\" if she doesn't take it. Overdue for follow with GI. Asthma-has been very well controlled. Had labs done in March by Dr. Peggy Quintero, was okay other than elevated wbc, did see Dr. Mike Forrest who wants to monitor for now. Will f/u with him in October. Right hip pain-started 6 months ago, has not improved, feels like a slipped disc, located mainly in right hip, no radiating pain, has h/o sciatica and this feels different than that. Has taken muscle relaxers, medrol pack, tylenol, icy hot with no relief. Intermittent lump on right side of jawline. Not there all the time, tender when it is present. Not there today.     Past Medical History:   Diagnosis Date    Anxiety     Asthma     CAD (coronary artery disease)     Tachycardia    Depression 10/29/2013    Dyslipidemia     Elevated blood pressure     Fibromyalgia     Fingernail abnormalities     right middle fingernail deformity    Generalized anxiety disorder     GERD (gastroesophageal reflux disease)     HPV (human papillomavirus)     Hypertension     IBS (irritable bowel syndrome) 2012    Migraine     Obesity     Pap smear for cervical cancer screening     2009- normal, pcos    PCOS (polycystic ovarian syndrome)     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     Patient Active Problem List    Diagnosis Date Noted    Fibromyalgia     Vertigo 10/29/2013    Depression 10/29/2013    Coagulopathy ([address] 10/29/2013    IBS (irritable bowel syndrome) 2012    Status post cholecystectomy 2012    Nicotine dependence 2012    Malabsorption syndrome 2012    Family history of cancer 2012    Asthma     Generalized anxiety disorder     GERD (gastroesophageal reflux disease)     Migraine     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HCC)     PCOS (polycystic ovarian syndrome)     Elevated blood pressure reading     Human papilloma virus infection     Dyslipidemia     Obesity      Past Surgical History:   Procedure Laterality Date    ARM SURGERY Left     ulna shortening    CARPAL TUNNEL RELEASE Bilateral      SECTION      CHOLECYSTECTOMY      LUMBAR DISC SURGERY  10/10/2018    DR Carmel Manriquez [place] EGD TRANSORAL BIOPSY SINGLE/MULTIPLE N/A 2/15/2018    EGD ESOPHAGOGASTRODUODENOSCOPY performed by Angeles Bui MD at [id number] ARTHROSCOPY Left     SHOULDER ARTHROSCOPY Left 2017    Ambreen Zapata MD     WRIST ARTHROSCOPY Left      Family History   Problem Relation Age of Onset    Arthritis Mother     Depression Mother     Depression Father     Diabetes Other     Cancer Other     Stroke Other     Obesity Other     Diabetes Maternal Aunt     Diabetes Maternal Uncle     Cancer Maternal Grandmother         colon    Cancer Paternal Grandfather      Social History     Socioeconomic History    Marital status:      Spouse name: None    Number of children: None    Years of education: None    Highest education level: None   Occupational History    None   Tobacco Use    Smoking status: Current Every Day Smoker     Packs/day: 0.30     Years: 17.00     Pack years: 5.10     Types: Cigarettes    Smokeless tobacco: Never Used   Substance and Sexual Activity    Alcohol use: No    Drug use: No    Sexual activity: Never   Other Topics Concern    None   Social History Narrative    None     Social Determinants of Health     Financial Resource Strain: High Risk    Difficulty of Paying Living Expenses: Very hard   Food Insecurity: No Food Insecurity    Worried About Running Out of Food in the Last Year: Never true    Say of Food in the Last Year: Never true   Transportation Needs: Unmet Transportation Needs    Lack of Transportation (Medical):  Yes    Lack of Transportation (Non-Medical): Yes   Physical Activity:     Days of Exercise per Week: Not on file    Minutes of Exercise per Session: Not on file   Stress:     Feeling of Stress : Not on file   Social Connections:     Frequency of Communication with Friends and Family: Not on file    Frequency of Social Gatherings with Friends and Family: Not on file    Attends Latter-day Services: Not on file    Active Member of [address] [place] [address] or Organizations: Not on file    Attends Club or Organization Meetings: Not on file    Marital Status: Not on file   Intimate Partner Violence:     Fear of Current or Ex-Partner: Not on file    Emotionally Abused: Not on file    Physically Abused: Not on file    Sexually Abused: Not on file   Housing Stability:     Unable to Pay for Housing in the Last Year: Not on file    Number of [place] in the Last Year: Not on file    Unstable Housing in the Last Year: Not on file     Current Outpatient Medications on File Prior to Visit   Medication Sig Dispense Refill    gabapentin (NEURONTIN) 300 MG capsule Take one (1) capsule by mouth three times a day      FLUoxetine (PROZAC) 20 MG capsule Take one (1) capsule by mouth every morning      tiZANidine (ZANAFLEX) 2 MG tablet TAKE 1 TABLET Bedtime      dicyclomine (BENTYL) 20 MG tablet Take 1 tablet by mouth every 6 hours 120 tablet 0    levocetirizine (XYZAL) 5 MG tablet Take 1 tablet by mouth nightly 30 tablet 0    cyclobenzaprine (FLEXERIL) 10 MG tablet Take 1 tablet by mouth 3 times daily as needed for Muscle spasms 30 tablet 0    Biotin 5 MG TABS Take by mouth      albuterol (PROVENTIL) (2.5 MG/3ML) 0.083% nebulizer solution USE 1 VIAL PER NEBULIZER FOUR TIMES A DAY AS NEEDED FOR SHORTNESS OF BREATH 360 mL 0    doxepin (SINEQUAN) 75 MG capsule Take one (1) capsule by mouth at bedtime      paliperidone (INVEGA) 3 MG extended release tablet Take one (1) tablet by mouth at bedtime      ALPRAZolam (XANAX) 0.5 MG tablet TAKE 1 TABLET BY MOUTH THREE TIMES DAILY AS NEEDED (Patient not taking: Reported on 7/8/2022)      nabumetone (RELAFEN) 750 MG tablet TAKE 1 TABLET BY MOUTH TWICE DAILY (Patient not taking: Reported on 7/8/2022)      venlafaxine (EFFEXOR XR) 75 MG extended release capsule TAKE 1 CAPSULE EVERY EVENING (Patient not taking: Reported on 7/8/2022)      venlafaxine (EFFEXOR XR) 150 MG extended release capsule  (Patient not taking: Reported on 7/8/2022)       No current facility-administered medications on file prior to visit. Allergies   Allergen Reactions    Iodides     Lamotrigine Other (See Comments)     Bruising, aggression       Review of Systems   Constitutional: Negative for activity change, appetite change, chills, diaphoresis, fatigue, fever and malaise/fatigue. HENT: Negative for congestion, ear pain, hearing loss and trouble swallowing. Eyes: Negative for pain and visual disturbance. Respiratory: Negative for cough, chest tightness and shortness of breath. Cardiovascular: Negative for chest pain, palpitations and leg swelling. Gastrointestinal: Negative for abdominal pain, constipation and diarrhea. Endocrine: Negative for polydipsia, polyphagia and polyuria. Genitourinary: Negative for difficulty urinating and dysuria.    Musculoskeletal: Positive for arthralgias. Negative for back pain. Skin: Negative for color change and rash. Neurological: Negative for dizziness and light-headedness. Psychiatric/Behavioral: Negative for dysphoric mood. The patient is not nervous/anxious. Objective  Vitals:    07/08/22 1446   BP: 114/88   Site: Left Upper Arm   Position: Sitting   Cuff Size: Large Adult   Pulse: 85   Temp: 98 °F (36.7 °C)   TempSrc: Infrared   SpO2: 98%   Weight: (!) 345 lb (156.5 kg)     Physical Exam  Vitals reviewed. Constitutional:       General: She is not in acute distress. Appearance: Normal appearance. She is well-developed and normal weight. She is not ill-appearing, toxic-appearing or diaphoretic. HENT:      Head: Normocephalic and atraumatic. Right Ear: Hearing, tympanic membrane, ear canal and external ear normal. There is no impacted cerumen. Left Ear: Hearing, tympanic membrane, ear canal and external ear normal. There is no impacted cerumen. Nose: Nose normal. No congestion or rhinorrhea. Mouth/Throat:      Mouth: Mucous membranes are moist.      Pharynx: Oropharynx is clear. No oropharyngeal exudate or posterior oropharyngeal erythema. Eyes:      General:         Right eye: No discharge. Left eye: No discharge. Conjunctiva/sclera: Conjunctivae normal.      Pupils: Pupils are equal, round, and reactive to light. Neck:      Thyroid: No thyromegaly. Cardiovascular:      Rate and Rhythm: Normal rate and regular rhythm. Pulses: Normal pulses. Heart sounds: Normal heart sounds. No murmur heard. Pulmonary:      Effort: Pulmonary effort is normal. No respiratory distress. Breath sounds: Normal breath sounds. No stridor. No wheezing, rhonchi or rales. Chest:      Chest wall: No tenderness. Musculoskeletal:         General: Normal range of motion. Cervical back: Neck supple. No tenderness. Right lower leg: No edema. Left lower leg: No edema. Lymphadenopathy:      Cervical: No cervical adenopathy. Skin:     General: Skin is warm and dry. Capillary Refill: Capillary refill takes less than 2 seconds. Coloration: Skin is not jaundiced or pale. Findings: No bruising, erythema, lesion or rash. Neurological:      General: No focal deficit present. Mental Status: She is alert and oriented to person, place, and time. Mental status is at baseline. Cranial Nerves: No cranial nerve deficit. Coordination: Coordination normal.      Gait: Gait normal.   Psychiatric:         Mood and Affect: Mood normal.         Speech: Speech normal.         Behavior: Behavior normal.         Thought Content: Thought content normal.         Judgment: Judgment normal.         Assessment& Plan     Diagnosis Orders   1. Chronic hip pain, right  XR HIP RIGHT (2-3 VIEWS)    Ambulatory referral to Physical Therapy    meloxicam (MOBIC) 15 MG tablet   2. Gastroesophageal reflux disease without esophagitis  docusate sodium (DOK) 100 MG capsule    pantoprazole (PROTONIX) 40 MG tablet   3. Mild persistent asthma with exacerbation  albuterol sulfate HFA (VENTOLIN HFA) 108 (90 Base) MCG/ACT inhaler   4. Nausea  promethazine (PHENERGAN) 25 MG tablet   5. Essential hypertension  lisinopril (PRINIVIL;ZESTRIL) 20 MG tablet     Plan as above. F/u in 6 weeks or sooner PRN. Side effects, adverse effects of the medication prescribed today, as well as treatment plan/ rationale and result expectations have been discussed with the patient who expresses understanding and desires to proceed. Close follow up to evaluate treatment results and for coordination of care. I have reviewed the patient's medical history in detail and updated the computerized patient record. As always, patient is advised that if symptoms worsen in any way they will proceed to the nearest emergency room.        Orders Placed This Encounter   Procedures    XR HIP RIGHT (2-3 VIEWS)     Standing Status:   Future     Standing Expiration Date:   7/8/2023     Order Specific Question:   Reason for exam:     Answer:   right hip pain    Ambulatory referral to Physical Therapy     Referral Priority:   Routine     Referral Type:   Eval and Treat     Referral Reason:   Specialty Services Required     Requested Specialty:   Physical Therapist     Number of Visits Requested:   1       Orders Placed This Encounter   Medications    meloxicam (MOBIC) 15 MG tablet     Sig: TAKE 1 TABLET DAILY AS NEEDED.      Dispense:  90 tablet     Refill:  0    docusate sodium (DOK) 100 MG capsule     Sig: Take 1 capsule by mouth 2 times daily as needed for Constipation     Dispense:  180 capsule     Refill:  0    albuterol sulfate HFA (VENTOLIN HFA) 108 (90 Base) MCG/ACT inhaler     Sig: Inhale 2 puffs into the lungs 4 times daily as needed for Wheezing     Dispense:  1 each     Refill:  0    promethazine (PHENERGAN) 25 MG tablet     Sig: Take 1 tablet by mouth every 8 hours as needed for Nausea     Dispense:  30 tablet     Refill:  0    pantoprazole (PROTONIX) 40 MG tablet     Sig: Take 1 tablet by mouth 2 times daily     Dispense:  180 tablet     Refill:  1    metoprolol tartrate (LOPRESSOR) 50 MG tablet     Sig: Take 1 tablet by mouth 2 times daily     Dispense:  180 tablet     Refill:  1    lisinopril (PRINIVIL;ZESTRIL) 20 MG tablet     Sig: Take 1 tablet by mouth daily     Dispense:  30 tablet     Refill:  5       Medications Discontinued During This Encounter   Medication Reason    promethazine (PHENERGAN) 25 MG tablet REORDER    albuterol sulfate HFA (VENTOLIN HFA) 108 (90 Base) MCG/ACT inhaler REORDER    pantoprazole (PROTONIX) 40 MG tablet REORDER    metoprolol tartrate (LOPRESSOR) 50 MG tablet REORDER    lisinopril (PRINIVIL;ZESTRIL) 20 MG tablet REORDER    docusate sodium (DOK) 100 MG capsule REORDER    meloxicam (MOBIC) 15 MG tablet REORDER       Return in about 6 weeks (around 8/19/2022) for right hip pain, 30 min please.     Roseline Domingo, APRN - CNP

## 2022-07-10 ENCOUNTER — PATIENT MESSAGE (OUTPATIENT)
Dept: FAMILY MEDICINE CLINIC | Age: 37
End: 2022-07-10

## 2022-07-11 RX ORDER — DICYCLOMINE HCL 20 MG
20 TABLET ORAL EVERY 6 HOURS
Qty: 120 TABLET | Refills: 0 | Status: SHIPPED | OUTPATIENT
Start: 2022-07-11 | End: 2022-07-22 | Stop reason: SDUPTHER

## 2022-07-11 NOTE — TELEPHONE ENCOUNTER
From: Yong Reed  To: Jason Miller  Sent: 7/10/2022 6:45 PM EDT  Subject: Refill    Hi, I was just in the office and seen Dequan Wells. And she was supposed to be sending over refills for all my medicine. However, the pharmacy did not refill my bentyl 20mg 4x/day, which is how I take it everyday without fail or my stomach is a mess. I was just wondering if she could send over the 90 day supply for it. Thanks.

## 2022-07-22 RX ORDER — DICYCLOMINE HCL 20 MG
20 TABLET ORAL EVERY 6 HOURS
Qty: 120 TABLET | Refills: 0 | Status: SHIPPED | OUTPATIENT
Start: 2022-07-22

## 2022-09-15 ENCOUNTER — OFFICE VISIT (OUTPATIENT)
Dept: FAMILY MEDICINE CLINIC | Age: 37
End: 2022-09-15
Payer: COMMERCIAL

## 2022-09-15 VITALS
OXYGEN SATURATION: 97 % | HEIGHT: 67 IN | BODY MASS INDEX: 45.99 KG/M2 | HEART RATE: 87 BPM | WEIGHT: 293 LBS | TEMPERATURE: 97.8 F | SYSTOLIC BLOOD PRESSURE: 128 MMHG | DIASTOLIC BLOOD PRESSURE: 78 MMHG

## 2022-09-15 DIAGNOSIS — H60.393 OTHER INFECTIVE ACUTE OTITIS EXTERNA OF BOTH EARS: Primary | ICD-10-CM

## 2022-09-15 DIAGNOSIS — H92.01 ACUTE OTALGIA, RIGHT: ICD-10-CM

## 2022-09-15 PROCEDURE — 4004F PT TOBACCO SCREEN RCVD TLK: CPT | Performed by: NURSE PRACTITIONER

## 2022-09-15 PROCEDURE — G8417 CALC BMI ABV UP PARAM F/U: HCPCS | Performed by: NURSE PRACTITIONER

## 2022-09-15 PROCEDURE — 4130F TOPICAL PREP RX AOE: CPT | Performed by: NURSE PRACTITIONER

## 2022-09-15 PROCEDURE — G8427 DOCREV CUR MEDS BY ELIG CLIN: HCPCS | Performed by: NURSE PRACTITIONER

## 2022-09-15 PROCEDURE — 99213 OFFICE O/P EST LOW 20 MIN: CPT | Performed by: NURSE PRACTITIONER

## 2022-09-15 RX ORDER — DOXEPIN HYDROCHLORIDE 100 MG/1
CAPSULE ORAL
COMMUNITY
Start: 2022-07-08

## 2022-09-15 RX ORDER — CETIRIZINE HYDROCHLORIDE, PSEUDOEPHEDRINE HYDROCHLORIDE 5; 120 MG/1; MG/1
1 TABLET, FILM COATED, EXTENDED RELEASE ORAL 2 TIMES DAILY
Qty: 28 TABLET | Refills: 0 | Status: SHIPPED | OUTPATIENT
Start: 2022-09-15 | End: 2022-09-29

## 2022-09-15 ASSESSMENT — ENCOUNTER SYMPTOMS
CONSTIPATION: 0
COLOR CHANGE: 0
DIARRHEA: 0
BACK PAIN: 0
SINUS PRESSURE: 0
RHINORRHEA: 0
TROUBLE SWALLOWING: 0
CHEST TIGHTNESS: 0
SINUS PAIN: 0
SORE THROAT: 0
VOMITING: 0
ABDOMINAL DISTENTION: 0
ABDOMINAL PAIN: 0
NAUSEA: 0
COUGH: 0
SHORTNESS OF BREATH: 0
WHEEZING: 0

## 2022-09-15 NOTE — PROGRESS NOTES
[facility] Encounter      [address]       Chief Complaint   Patient presents with    Other     Bilateral Ear pain x1 month        Nurses Notes reviewed and I agree except as noted in the HPI. HISTORY OF PRESENT ILLNESS   Yong Reed is a 40 y.o. female who presents   Otalgia   There is pain in both ears. This is a new problem. The current episode started more than 1 month ago. The problem occurs constantly. The problem has been gradually worsening. There has been no fever. The pain is at a severity of 0/10 (5/10 at worst mostly right side, intermittent). Pertinent negatives include no abdominal pain, coughing, diarrhea, ear discharge, headaches, hearing loss, neck pain, rash, rhinorrhea, sore throat or vomiting. She has tried ear drops (peroxide) for the symptoms. There is no history of a chronic ear infection, hearing loss or a tympanostomy tube. REVIEW OF SYSTEMS     Review of Systems   Constitutional:  Negative for activity change, appetite change, chills, diaphoresis, fatigue and fever. HENT:  Positive for ear pain. Negative for congestion, ear discharge, hearing loss, postnasal drip, rhinorrhea, sinus pressure, sinus pain, sore throat and trouble swallowing. Eyes:  Negative for visual disturbance. Respiratory:  Negative for cough, chest tightness, shortness of breath and wheezing. Cardiovascular:  Negative for chest pain and palpitations. Gastrointestinal:  Negative for abdominal distention, abdominal pain, constipation, diarrhea, nausea and vomiting. Genitourinary:  Negative for difficulty urinating, dysuria, flank pain, frequency and urgency. Musculoskeletal:  Negative for arthralgias, back pain, myalgias, neck pain and neck stiffness. Skin:  Negative for color change and rash. Neurological:  Negative for dizziness, tremors, seizures, syncope, speech difficulty, weakness, light-headedness, numbness and headaches.      PAST MEDICAL HISTORY Diagnosis Date    Anxiety     Asthma     CAD (coronary artery disease)     Tachycardia    Depression 10/29/2013    Dyslipidemia     Elevated blood pressure     Fibromyalgia     Fingernail abnormalities     right middle fingernail deformity    Generalized anxiety disorder     GERD (gastroesophageal reflux disease)     HPV (human papillomavirus)     Hypertension     IBS (irritable bowel syndrome) 2012    Migraine     Obesity     Pap smear for cervical cancer screening     - normal, pcos    PCOS (polycystic ovarian syndrome)     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[address]     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       SURGICAL HISTORY     Patient  has a past surgical history that includes Cholecystectomy;  section; Carpal tunnel release (Bilateral); Wrist arthroscopy (Left); Arm Surgery (Left); Shoulder arthroscopy (Left); pr egd transoral biopsy single/multiple (N/A, 2/15/2018); Shoulder arthroscopy (Left, 2017); and Lumbar disc surgery (10/10/2018). CURRENT MEDICATIONS       Previous Medications    ALBUTEROL (PROVENTIL) (2.5 MG/3ML) 0.083% NEBULIZER SOLUTION    USE 1 VIAL PER NEBULIZER FOUR TIMES A DAY AS NEEDED FOR SHORTNESS OF BREATH    ALBUTEROL SULFATE HFA (VENTOLIN HFA) 108 (90 BASE) MCG/ACT INHALER    Inhale 2 puffs into the lungs 4 times daily as needed for Wheezing    ALPRAZOLAM (XANAX) 0.5 MG TABLET    TAKE 1 TABLET BY MOUTH THREE TIMES DAILY AS NEEDED    BIOTIN 5 MG TABS    Take by mouth    CYCLOBENZAPRINE (FLEXERIL) 10 MG TABLET    Take 1 tablet by mouth 3 times daily as needed for Muscle spasms    DICYCLOMINE (BENTYL) 20 MG TABLET    Take 1 tablet by mouth in the morning and 1 tablet at noon and 1 tablet in the evening and 1 tablet before bedtime.     DOCUSATE SODIUM (DOK) 100 MG CAPSULE    Take 1 capsule by mouth 2 times daily as needed for Constipation    DOXEPIN (SINEQUAN) 100 MG CAPSULE    Take one (1) capsule by mouth at bedtime    DOXEPIN (SINEQUAN) 75 MG CAPSULE    Take one (1) capsule by mouth at bedtime    FLUOXETINE (PROZAC) 20 MG CAPSULE    Take one (1) capsule by mouth every morning    GABAPENTIN (NEURONTIN) 300 MG CAPSULE    Take one (1) capsule by mouth three times a day    LEVOCETIRIZINE (XYZAL) 5 MG TABLET    Take 1 tablet by mouth nightly    LISINOPRIL (PRINIVIL;ZESTRIL) 20 MG TABLET    Take 1 tablet by mouth daily    MELOXICAM (MOBIC) 15 MG TABLET    TAKE 1 TABLET DAILY AS NEEDED. METOPROLOL TARTRATE (LOPRESSOR) 50 MG TABLET    Take 1 tablet by mouth 2 times daily    NABUMETONE (RELAFEN) 750 MG TABLET    TAKE 1 TABLET BY MOUTH TWICE DAILY    PALIPERIDONE (INVEGA) 3 MG EXTENDED RELEASE TABLET    Take one (1) tablet by mouth at bedtime    PANTOPRAZOLE (PROTONIX) 40 MG TABLET    Take 1 tablet by mouth 2 times daily    PROMETHAZINE (PHENERGAN) 25 MG TABLET    Take 1 tablet by mouth every 8 hours as needed for Nausea    TIZANIDINE (ZANAFLEX) 2 MG TABLET    TAKE 1 TABLET Bedtime    VENLAFAXINE (EFFEXOR XR) 150 MG EXTENDED RELEASE CAPSULE        VENLAFAXINE (EFFEXOR XR) 75 MG EXTENDED RELEASE CAPSULE    TAKE 1 CAPSULE EVERY EVENING       ALLERGIES     Patientis is allergic to iodides and lamotrigine. FAMILY HISTORY     Patient's family history includes Arthritis in her mother; Cancer in her maternal grandmother, paternal grandfather, and another family member; Depression in her father and mother; Diabetes in her maternal aunt, maternal uncle, and another family member; Obesity in an other family member; Stroke in an other family member. SOCIAL HISTORY     Patient  reports that she has been smoking cigarettes. She has a 5.10 pack-year smoking history. She has never used smokeless tobacco. She reports that she does not drink alcohol and does not use drugs. PHYSICAL EXAM     ED TRIAGE VITALS  BP: 128/78, Temp: 97.8 °F (36.6 °C), Heart Rate: 87,  , SpO2: 97 %  Physical Exam  Constitutional:       General: She is not in acute distress. Appearance: Normal appearance.  She is obese. She is not ill-appearing, toxic-appearing or diaphoretic. HENT:      Head: Normocephalic and atraumatic. Right Ear: Hearing, tympanic membrane and external ear normal.      Left Ear: Hearing, tympanic membrane and external ear normal.      Ears:      Comments: Bilateral erythema of canals     Nose: Mucosal edema present. Mouth/Throat:      Lips: Pink. Mouth: Mucous membranes are moist.      Pharynx: Oropharynx is clear. No pharyngeal swelling, oropharyngeal exudate, posterior oropharyngeal erythema or uvula swelling. Eyes:      General:         Right eye: No discharge. Left eye: No discharge. Conjunctiva/sclera: Conjunctivae normal.      Pupils: Pupils are equal, round, and reactive to light. Cardiovascular:      Rate and Rhythm: Normal rate and regular rhythm. Pulses: Normal pulses. Pulmonary:      Effort: Pulmonary effort is normal.   Musculoskeletal:         General: No tenderness or signs of injury. Normal range of motion. Cervical back: Normal range of motion and neck supple. No rigidity or tenderness. Skin:     General: Skin is warm and dry. Capillary Refill: Capillary refill takes less than 2 seconds. Neurological:      General: No focal deficit present. Mental Status: She is alert and oriented to person, place, and time. Cranial Nerves: No cranial nerve deficit. Sensory: No sensory deficit. Motor: No weakness. Coordination: Coordination normal.       DIAGNOSTICRESULTS   Labs:       IMAGING:    URGENT CARE COURSE:     Vitals:    09/15/22 1047   BP: 128/78   Pulse: 87   Temp: 97.8 °F (36.6 °C)   SpO2: 97%   Weight: (!) 344 lb (156 kg)   Height: 5' 7\" (1.702 m)         PROCEDURES:  None  FINAL IMPRESSION      1. Other infective acute otitis externa of both ears    2.  Acute otalgia, right        DISPOSITION/PLAN   DISPOSITION    Extended discharge instructions provided to patient including signs, symptoms and red flags that they should return to the emergency department. They express good understanding. Standard anticipatory guidance given to patient upon discharge. Have given them a specific time frame in which to follow-up and who to follow-up with. I have also advised them that they should return to the emergency department if they get worse, or not getting better or develop any new or concerning symptoms. Patient demonstrates understanding and all questions were answered. PATIENT REFERRED TO:  Return in about 1 week (around 9/22/2022), or if symptoms worsen or fail to improve, for follow up with PCP. DISCHARGE MEDICATIONS:  New Prescriptions    CETIRIZINE-PSUEDOEPHEDRINE (ZYRTEC-D) 5-120 MG PER EXTENDED RELEASE TABLET    Take 1 tablet by mouth 2 times daily for 14 days    NEOMYCIN-POLYMYXIN-HYDROCORTISONE (CORTISPORIN) 3.5-82480-8 OTIC SOLUTION    Place 3 drops into both ears 3 times daily for 10 days Instill into both Ear     Cannot display discharge medications since this is not an admission.       Ariana Matamoros, APRN - CNP

## 2022-09-27 DIAGNOSIS — H92.01 OTALGIA OF RIGHT EAR: Primary | ICD-10-CM

## 2022-09-27 NOTE — PROGRESS NOTES
Patient requested referral to ENT for right ear pain over 2 months, treatments not working, drops not helping. Per patient message pain getting worse and would like to be seen by ENT.

## 2023-10-29 NOTE — TELEPHONE ENCOUNTER
Future Appointments    This patient does not currently have any appointments scheduled.     Recent Visits    05/20/2021 Acute upper respiratory infection   [id number] Denney, APRN - CNP     09/07/21 last fills Vaccine status unknown

## (undated) DEVICE — GLOVE ORANGE PI 8   MSG9080

## (undated) DEVICE — LUBRICANT SURG JELLY ST BACTER TUBE 4.25OZ

## (undated) DEVICE — ENDO CARRY-ON PROCEDURE KIT: Brand: ENDO CARRY-ON PROCEDURE KIT

## (undated) DEVICE — CONMED SCOPE SAVER BITE BLOCK, 20X27 MM: Brand: SCOPE SAVER

## (undated) DEVICE — TUBE SET 96 MM 64 MM H2O PERISTALTIC STD AUX CHANNEL

## (undated) DEVICE — ADAPTER FLSH PMP FLD MGMT GI IRRIG OFP 2 DISPOSABLE

## (undated) DEVICE — SONY PRINTER PAPER

## (undated) DEVICE — Device: Brand: ENDO SMARTCAP